# Patient Record
Sex: MALE | Race: BLACK OR AFRICAN AMERICAN | NOT HISPANIC OR LATINO | Employment: OTHER | URBAN - METROPOLITAN AREA
[De-identification: names, ages, dates, MRNs, and addresses within clinical notes are randomized per-mention and may not be internally consistent; named-entity substitution may affect disease eponyms.]

---

## 2022-07-15 DIAGNOSIS — C01 MALIGNANT NEOPLASM OF BASE OF TONGUE: Primary | ICD-10-CM

## 2022-07-16 PROBLEM — C01 SQUAMOUS CELL CARCINOMA OF BASE OF TONGUE: Status: ACTIVE | Noted: 2022-07-16

## 2022-07-16 PROBLEM — C77.0 SECONDARY MALIGNANT NEOPLASM OF CERVICAL LYMPH NODE: Status: ACTIVE | Noted: 2022-07-16

## 2022-07-16 PROBLEM — Z72.0 TOBACCO ABUSE: Status: ACTIVE | Noted: 2022-07-16

## 2022-07-16 PROBLEM — Z78.9 ALCOHOL USE: Status: ACTIVE | Noted: 2022-07-16

## 2022-07-16 PROBLEM — F10.90 ALCOHOL USE: Status: ACTIVE | Noted: 2022-07-16

## 2022-07-16 NOTE — PROGRESS NOTES
Past medical history:  Tobacco abuse since age 17. Intermittent alcohol use.  Procedure/surgical history: Back surgery (20-30 years ago, in use 10, apparently involving L5 fusion for prolapse this from work-related injury).  Social history:  Has been smoking a pack of cigarettes daily since age 17 years.  Has been drinking 6 beers daily for last 40 years.  Lately, trying to quit smoking and drinking.  Smokes marijuana.  .  Has 2 biological children of his own.  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  Lives in [place], [place]..  Family history:  Father  from some kind of cancer at age 72 years.  Health maintenance:  Does not have a regular primary care provider.  Does not visit with doctors.  No screening colonoscopy ever.      History of present illness:  57-year-old gentleman    He was admitted to [facility] 2022 with 2 months history of progressive dysphagia, odynophagia, soreness of tongue, 20 lb weight loss and bilateral neck masses.  Also, hoarseness.  CT neck showed ulcerated mass in the tongue base, malignant-appearing, along with lymph node metastasis.  ENT performed a laryngoscopy with biopsy of tongue mass.  Friable base of tongue mass.  No airway compromise.  Right upper lung lobe indistinct, ground-glass lung nodules, typical of a benign inflammatory etiology.  Discharged 2022.  Patient reported weight loss of> 20 lb in previous 2 months.    2022:  BUN 26. Creatinine 0.81, normal.  CMP essentially unremarkable.  WBC 14.0.  Hemoglobin 13.8.  Platelets 392 K. neutrophils 35%.  Lymphocytes 7%.      2022:  Panendoscopy ([facility]; Red Tay MD, ENT):  -progressive dysphagia, bilateral bulky cervical lymphadenopathy  -large friable base of tongue mass  -ulcerated friable mass base of the tongue that seems confined to base of tongue; lingual surface of  epiglottis is free from tumor; mass involves bilateral base of tongue, significantly midline    Pathology:  Tongue, base, biopsy:  -invasive squamous cell carcinoma, grade 3 of 4  -p16 +    07/05/2022: CT soft tissue of the neck without contrast:  -ulcerated tongue base mass, at least 3.4 cm by 3.0 cm x 3.1 cm; associated bulky level 2 and level 3 cervical lymphadenopathy; suspected level 1 and level 4 cervical lymphadenopathy; a right level 2 lymph node with central necrosis 2.6 x 3.3 cm; a left level 3 lymph node 4.5 x 3.2 cm; no significant airway compromise; no acute or aggressive bony lesions  -malignant ulcerated type mass with lymph node metastasis; no airway compromise; right upper lung lobe indistinct ground-glass pulmonary nodules typical of benign infectious or inflammatory etiology    07/18/2022:  Presents for initial medical oncology consultation, accompanied by his wife.  Pleasant gentleman.  In no acute discomfort.  40 lb weight loss in last 3 months.  Appetite is preserved.  Dysphagia.  No odynophagia.  Pain left side of throat overall the lymphadenopathy as well as during swallowing.  No hemoptysis, nausea, vomiting.  No dyspnea. Left-sided otalgia.  ECOG 0.   Chronic tobacco and alcohol abuse (see above).  Bilateral cervical lymphadenopathy started 3 months ago; slowly progressive.  -no unusual headaches, loss of consciousness, seizures, stroke-like symptoms, fevers, or chills.  No abdominal pain, nausea, vomiting.      Interval history:      Review of systems:  All systems reviewed, and found to be negative except for the symptoms detailed above.      Physical examination:  VITAL SIGNS:  Reviewed.      GENERAL:  In no apparent distress.    HEAD:  No signs of head trauma.  EYES:  Pupils are equal.  Extraocular motions intact.    EARS:  Hearing grossly intact.  MOUTH:  Oropharynx is normal.   NECK:  No adenopathy, no JVD.     CHEST:  Chest with clear breath sounds bilaterally.  No wheezes, rales, or  rhonchi.    CARDIAC:  Regular rate and rhythm.  S1 and S2, without murmurs, gallops, or rubs.  VASCULAR:  No Edema.  Peripheral pulses normal and equal in all extremities.  ABDOMEN:  Soft, without detectable tenderness.  No sign of distention.  No   rebound or guarding, and no masses palpated.   Bowel Sounds normal.  MUSCULOSKELETAL:  Good range of motion of all major joints. Extremities without clubbing, cyanosis or edema.    NEUROLOGIC EXAM:  Alert and oriented x 3.  No focal sensory or strength deficits.   Speech normal.  Follows commands.  PSYCHIATRIC:  Mood normal.  SKIN:  No rash or lesions.  07/18/2022:  In no acute discomfort.  Bulky bilateral cervical lymphadenopathy is apparent.  Left cervical lymphadenopathy measures at least 7 cm x 6 cm, slightly tender.  Right cervical lymphadenopathy measures about 4 cm x 3 cm.  Lymphadenopathy is mobile; left lymphadenopathy less so.  No superficial ulceration.      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, p16 +:  -presentation:  07/2022:  Dysphagia, odynophagia, 20 lb weight loss, bilateral neck masses, hoarseness  -CT soft tissues of the neck without contrast 07/05/2022  -S/P panendoscopy 07/06/2022  -ulcerated tongue base mass, at least 3.4 x 3.0 x 3.1 cm  -right level 2 lymph node with central necrosis, 2.6 x 3.2 cm on CT  -left level 3 lymph node 4.5 x 3.2 cm on CT  -ulcerated friable mass base of the tongue confined to base of tongue on panendoscopy  -invasive squamous cell carcinoma, grade 3 of 4; p16 +  -if no metastasis, then, cT2 cN2 MX, clinical prognostic stage II    # history of tobacco abuse since age 17    # history of alcohol use      Plan:    -if no metastasis, then, treatment options:  1. Concurrent chemoradiation therapy; or  2. Resection of primary and ipsilateral or bilateral neck dissection; or  3. Induction chemotherapy (category 3 recommendation) followed by radiotherapy or chemoradiation therapy     -will request formal  opinion from ENT; will refer    -need contrast enhanced imaging of soft tissues of the neck (CT with contrast or MRI with contrast)  -FDG PET-CT for staging  -CT chest with or without contrast    -dental evaluation, nutrition/speech/swallow evaluation:  To be pursued once plans of definitive concurrent chemoradiation therapy are finalized  -smoking cessation counseling:  Smoking and alcohol cessation discussed in detail.    Once it is decided to proceed weight definitive concurrent chemoradiation therapy, then, plan will be as follows.    Throat pain:  He has been taking Norco 7.5 mg every 6 hours p.r.n., with satisfactory pain relief; will refill  Will send a consultation to Internal Medicine for him to get established with a PCP.    We will treat with high-dose cisplatin concurrent with RT  -Cisplatin 100 mg/m² IV days 1, 22, and 23, concurrent with RT    -We will administer chemotherapy via PICC line  -Close monitoring of toxicity including hematologic, renal, electrolyte imbalance, ototoxicity, neuropathy, etc.  -Check CBC, CMP, and magnesium level weekly  -Discussed potential side effects of chemotherapy and gave him educational materials from [employer]  -Formal chemotherapy teaching with nursing staff to follow  -Will refer to radiation oncology at this time  -We will coordinate chemotherapy with radiation oncology  -Close follow-up of clinical toxicity, counts, chemistry profile, and electrolytes; CBC, CMP, and magnesium level weekly.    -For follow-up after chemoradiation therapy, see below    Discussed potential side effects of chemotherapy and chemoradiation therapy including nausea, vomiting, nutritional compromise, kidney toxicity, electrolyte imbalance, ototoxicity, peripheral neuropathy, cytopenias, infections, need for blood transfusion, infections, sepsis, oral mucositis resulting in pain and nutritional compromise, dehydration, etc.    Post CRT neck evaluation:   4-8 weeks clinical assessment as  appropriate:   1.  If residual primary, persistent disease, or progression: Then assess extent of disease or distant metastasis with CT or MRI with contrast or FDG PET/CT; followed by resection   2.  If response: Then, assess extent of disease or distant metastasis with FDG PET/CT at minimum 12 weeks weeks (preferred), or CT of primary and neck and/or MRI with contrast at 8-12 weeks     Tobacco cessation discussed.    Follow-up in 2 weeks.    Above discussed with the patient and his wife.  All questions answered.  Discussed labs, scans, pathology report, and staging of oropharyngeal cancer, and gave him copies of reports.  Discussed plan of management in detail.  He understands and agrees with this plan.  -------------------      Discussion:  Chemotherapy:   High-dose cisplatin: Days 1, 22, and 43: Cisplatin 100 mg/m² IV + concurrent RT    Post CRT neck evaluation:   4-8 weeks clinical assessment as appropriate:   1.  If residual primary, persistent disease, or progression: Then assess extent of disease or distant metastasis with CT or MRI with contrast or FDG PET/CT; followed by resection   2.  If response: Then, assess extent of disease or distant metastasis with FDG PET/CT at minimum 12 weeks weeks (preferred), or CT of primary and neck and/or MRI with contrast at 8-12 weeks

## 2022-07-18 ENCOUNTER — OFFICE VISIT (OUTPATIENT)
Dept: HEMATOLOGY/ONCOLOGY | Facility: CLINIC | Age: 57
End: 2022-07-18
Payer: MEDICAID

## 2022-07-18 VITALS
TEMPERATURE: 99 F | DIASTOLIC BLOOD PRESSURE: 90 MMHG | OXYGEN SATURATION: 100 % | RESPIRATION RATE: 20 BRPM | HEART RATE: 83 BPM | WEIGHT: 160.06 LBS | BODY MASS INDEX: 22.41 KG/M2 | SYSTOLIC BLOOD PRESSURE: 141 MMHG | HEIGHT: 71 IN

## 2022-07-18 DIAGNOSIS — C01 SQUAMOUS CELL CARCINOMA OF BASE OF TONGUE: ICD-10-CM

## 2022-07-18 DIAGNOSIS — C77.0 SECONDARY MALIGNANT NEOPLASM OF CERVICAL LYMPH NODE: ICD-10-CM

## 2022-07-18 DIAGNOSIS — Z78.9 ALCOHOL USE: ICD-10-CM

## 2022-07-18 DIAGNOSIS — Z72.0 TOBACCO ABUSE: ICD-10-CM

## 2022-07-18 DIAGNOSIS — C01 SQUAMOUS CELL CARCINOMA OF BASE OF TONGUE: Primary | ICD-10-CM

## 2022-07-18 LAB
ALBUMIN SERPL-MCNC: 3.4 GM/DL (ref 3.5–5)
ALBUMIN/GLOB SERPL: 1 RATIO (ref 1.1–2)
ALP SERPL-CCNC: 72 UNIT/L (ref 40–150)
ALT SERPL-CCNC: 8 UNIT/L (ref 0–55)
AST SERPL-CCNC: 9 UNIT/L (ref 5–34)
BASOPHILS # BLD AUTO: 0.04 X10(3)/MCL (ref 0–0.2)
BASOPHILS NFR BLD AUTO: 0.3 %
BILIRUBIN DIRECT+TOT PNL SERPL-MCNC: 0.3 MG/DL
BUN SERPL-MCNC: 15.4 MG/DL (ref 8.4–25.7)
CALCIUM SERPL-MCNC: 9.7 MG/DL (ref 8.4–10.2)
CHLORIDE SERPL-SCNC: 106 MMOL/L (ref 98–107)
CO2 SERPL-SCNC: 29 MMOL/L (ref 22–29)
CREAT SERPL-MCNC: 0.87 MG/DL (ref 0.73–1.18)
EOSINOPHIL # BLD AUTO: 0.14 X10(3)/MCL (ref 0–0.9)
EOSINOPHIL NFR BLD AUTO: 1.1 %
ERYTHROCYTE [DISTWIDTH] IN BLOOD BY AUTOMATED COUNT: 12.8 % (ref 11.5–17)
GLOBULIN SER-MCNC: 3.5 GM/DL (ref 2.4–3.5)
GLUCOSE SERPL-MCNC: 107 MG/DL (ref 74–100)
HCT VFR BLD AUTO: 36.7 % (ref 42–52)
HGB BLD-MCNC: 11.6 GM/DL (ref 14–18)
IMM GRANULOCYTES # BLD AUTO: 0.04 X10(3)/MCL (ref 0–0.04)
IMM GRANULOCYTES NFR BLD AUTO: 0.3 %
LYMPHOCYTES # BLD AUTO: 1.83 X10(3)/MCL (ref 0.6–4.6)
LYMPHOCYTES NFR BLD AUTO: 14.3 %
MCH RBC QN AUTO: 29.5 PG (ref 27–31)
MCHC RBC AUTO-ENTMCNC: 31.6 MG/DL (ref 33–36)
MCV RBC AUTO: 93.4 FL (ref 80–94)
MONOCYTES # BLD AUTO: 1.11 X10(3)/MCL (ref 0.1–1.3)
MONOCYTES NFR BLD AUTO: 8.7 %
NEUTROPHILS # BLD AUTO: 9.6 X10(3)/MCL (ref 2.1–9.2)
NEUTROPHILS NFR BLD AUTO: 75.3 %
NRBC BLD AUTO-RTO: 0 %
PLATELET # BLD AUTO: 378 X10(3)/MCL (ref 130–400)
PMV BLD AUTO: 8.9 FL (ref 7.4–10.4)
POTASSIUM SERPL-SCNC: 4.1 MMOL/L (ref 3.5–5.1)
PROT SERPL-MCNC: 6.9 GM/DL (ref 6.4–8.3)
RBC # BLD AUTO: 3.93 X10(6)/MCL (ref 4.7–6.1)
SODIUM SERPL-SCNC: 142 MMOL/L (ref 136–145)
WBC # SPEC AUTO: 12.8 X10(3)/MCL (ref 4.5–11.5)

## 2022-07-18 PROCEDURE — 99205 OFFICE O/P NEW HI 60 MIN: CPT | Mod: S$PBB,,, | Performed by: INTERNAL MEDICINE

## 2022-07-18 PROCEDURE — 36415 COLL VENOUS BLD VENIPUNCTURE: CPT

## 2022-07-18 PROCEDURE — 99214 OFFICE O/P EST MOD 30 MIN: CPT | Mod: PBBFAC | Performed by: INTERNAL MEDICINE

## 2022-07-18 PROCEDURE — 99205 PR OFFICE/OUTPT VISIT, NEW, LEVL V, 60-74 MIN: ICD-10-PCS | Mod: S$PBB,,, | Performed by: INTERNAL MEDICINE

## 2022-07-18 RX ORDER — HYDROCODONE BITARTRATE AND ACETAMINOPHEN 5; 325 MG/1; MG/1
TABLET ORAL
COMMUNITY
Start: 2022-07-07 | End: 2022-07-18

## 2022-07-18 RX ORDER — AMLODIPINE BESYLATE 2.5 MG/1
2.5 TABLET ORAL DAILY
COMMUNITY
Start: 2022-07-07 | End: 2022-10-31 | Stop reason: SDUPTHER

## 2022-07-18 RX ORDER — HYDROCODONE BITARTRATE AND ACETAMINOPHEN 7.5; 325 MG/1; MG/1
1 TABLET ORAL EVERY 6 HOURS PRN
Qty: 56 TABLET | Refills: 0 | Status: SHIPPED | OUTPATIENT
Start: 2022-07-18 | End: 2022-08-01

## 2022-07-18 NOTE — TELEPHONE ENCOUNTER
Orders for today:     CBC, CMP     Contrast enhanced CT scan of soft tissues of the neck  Contrast enhanced CT scan of chest  FDG PET-CT for staging     Refer to ENT for formal consultation and recommendations in terms of treatment with concurrent chemoradiation therapy versus resection of primary and ipsilateral/bilateral neck dissection:  ASAP!     Follow-up in 2 weeks.

## 2022-07-26 ENCOUNTER — OFFICE VISIT (OUTPATIENT)
Dept: OTOLARYNGOLOGY | Facility: CLINIC | Age: 57
End: 2022-07-26
Payer: MEDICAID

## 2022-07-26 VITALS
HEIGHT: 71 IN | TEMPERATURE: 99 F | OXYGEN SATURATION: 99 % | HEART RATE: 74 BPM | DIASTOLIC BLOOD PRESSURE: 76 MMHG | WEIGHT: 159.19 LBS | SYSTOLIC BLOOD PRESSURE: 142 MMHG | BODY MASS INDEX: 22.29 KG/M2

## 2022-07-26 DIAGNOSIS — C01 SQUAMOUS CELL CARCINOMA OF BASE OF TONGUE: ICD-10-CM

## 2022-07-26 DIAGNOSIS — C77.0 SECONDARY MALIGNANT NEOPLASM OF CERVICAL LYMPH NODE: ICD-10-CM

## 2022-07-26 PROCEDURE — 99214 OFFICE O/P EST MOD 30 MIN: CPT | Mod: PBBFAC | Performed by: OTOLARYNGOLOGY

## 2022-07-26 RX ORDER — GABAPENTIN 100 MG/1
100 CAPSULE ORAL 3 TIMES DAILY
Qty: 90 CAPSULE | Refills: 11 | Status: SHIPPED | OUTPATIENT
Start: 2022-07-26 | End: 2022-12-01 | Stop reason: DRUGHIGH

## 2022-07-26 NOTE — H&P (VIEW-ONLY)
[facility]  Otolaryngology Clinic Note    Yaron Brennan  Encounter Date: 7/26/2022  YOB: 1965  Physician: Anna Taylor    Chief Complaint: Otalgia, neck mass    HPI: Yaron Brennan is a 57 y.o. male with HTN who presents as referral from Dr. Welsh for squamous cell carcinoma of base of tongue. Patient reports that toward the beginning of the year he started to have worsening left otalgia. He then noticed a bump/swelling on left neck that he thought was was due to a bug bite. This got progressively bigger over time and he then noticed some swelling on right neck as well. He eventually presented to [facility] in early July 2022. He had a CT neck that showed an ulcerated mass in the tongue base, and underwent direct laryngoscopy with ENT on 7/6/22 (Dr. Tay). Operative findings: ulcerated friable mass base of the tongue that seems confined to base of tongue; lingual surface of epiglottis is free from tumor; mass involves bilateral base of tongue, significantly midline. Results returned positive for p16+ SCCa. Today in clinic patient reports bilateral L>R otalgia, mild dysphagia/odynophagia. He reports he eats basically whatever he wants without overt signs of aspiration. He does report 40lb weight loss in the last 6 months. + Neck LAD. Reports voice changes. Denies any issues breathing. Smoked 1ppd since 17 years old (40 years) but quit a month ago when he got his diagnosis. Also previously drank 12 pack of beer a day and likewise quite a month ago. Referred to ENT for consideration of possible surgical resection.     07/05/2022: CT soft tissue of the neck without contrast:  -ulcerated tongue base mass, at least 3.4 cm by 3.0 cm x 3.1 cm; associated bulky level 2 and level 3 cervical lymphadenopathy; suspected level 1 and level 4 cervical lymphadenopathy; a right level 2 lymph node with central necrosis 2.6 x 3.3 cm; a left level 3 lymph node 4.5 x 3.2 cm; no  significant airway compromise; no acute or aggressive bony lesions  -malignant ulcerated type mass with lymph node metastasis; no airway compromise; right upper lung lobe indistinct ground-glass pulmonary nodules typical of benign infectious or inflammatory etiology    ROS:   General: Negative except per HPI  Skin: Denies rash, ulcer, or lesion.  Eyes: Denies vision changes or diplopia.  Ears: Negative except per HPI  Nose: Negative except per HPI  Throat/mouth: Negative except per HPI  Cardiovascular: Negative except per HPI  Respiratory: Negative except per HPI  Neck: Negative except per HPI  Endocrine: Negative except per HPI  Neurologic: Negative except per HPI    Other 10-point review of systems negative except per HPI      Review of patient's allergies indicates:  No Known Allergies    Past Medical History:   Diagnosis Date    Cancer     Hypertension        Past Surgical History:   Procedure Laterality Date    BACK SURGERY         Social History     Socioeconomic History    Marital status:    Tobacco Use    Smoking status: Former Smoker     Types: Cigarettes     Start date:      Quit date:      Years since quittin.5    Smokeless tobacco: Never Used   Substance and Sexual Activity    Alcohol use: Not Currently    Drug use: Yes     Types: Marijuana    Sexual activity: Yes       Family History   Problem Relation Age of Onset    Hypertension Mother     Cancer Father     Hypertension Sister     Hypertension Brother        Outpatient Encounter Medications as of 2022   Medication Sig Dispense Refill    amLODIPine (NORVASC) 2.5 MG tablet Take 2.5 mg by mouth once daily.      HYDROcodone-acetaminophen (NORCO) 7.5-325 mg per tablet Take 1 tablet by mouth every 6 (six) hours as needed for Pain. 56 tablet 0     No facility-administered encounter medications on file as of 2022.       Physical Exam:  Vitals:    22 1304 22 1314   BP: (!) 138/90 (!) 142/76   BP Location:  "Left arm Left arm   Patient Position: Sitting Sitting   BP Method: Large (Automatic) Large (Manual)   Pulse: 74    Temp: 99 °F (37.2 °C)    TempSrc: Oral    SpO2: 99%    Weight: 72.2 kg (159 lb 2.8 oz)    Height: 5' 11" (1.803 m)        Constitutional  General Appearance: well nourished, well-developed, AAO x3, NAD; voice muffled/hot potato voice  HEENT  Eyes: PEERLA, EOMI, normal conjunctivae  Ears: Hearing well at conversation level;    AD: auricle normal,cerumen impaction   AS: auricle normal, cerumen impaction   Vestibular: ambulates without gait disturbance  Nose: septum midline, no inferior turbinate hypertrophy, no polyps, moist mucosa without erythema or blue hue  OC/OP: dentition poor, no oral lesions, tongue/FOM/BOT- soft, no leukoplakia/ulcerations/ tenderness, good extension  Nasopharynx, Hypopharynx, and Larynx:    Indirect: attempted, limited view due to patient intolerance  Neck: Left level III/IV 5-6cm lymph node, non tender, firm; right level II 3cm lymph node  Neuro: CN II - XII intact bilaterally  Cardiovascular: peripheral pulses palpable  Respiratory: non-labored respirations  Psychiatric: oriented to time, place and person, no depression, anxiety or agitation    Procedures:Flexible Fiberoptic Laryngoscopy/Nasopharyngoscopy via right nare    Procedure in Detail: Informed consent was obtained from the patient after explanation of procedure, indications, risks and benefits. Flexible endoscopy was performed through the nasal passages. The nasal cavity, nasopharynx, oropharynx, hypopharynx and larynx were adequately visualized. The true vocal cords and arytenoids were examined during phonation and repose.    Anesthesia: None  Adverse Events: None  Resident Surgeon: Anna Taylor MD   Blood loss: none  Condition: good    Findings:  NP/OP: no masses/lesions of NC, eustachian tube, fossa of Rosenmuller, no adenoid hypertrophy  BOT/vallecula: secretions filling vallecula, discrete fullness/exophytic " "mass that appears to arise from left BOT extending across midline, lingual surface of epiglottis appears free of disease  Piriform sinuses/post-cricoid: no masses/lesions, no pooling of secretions; difficult to see left piriform  Epiglottis: lingual and laryngeal surfaces within normal limits  Arytenoids/FVFs: no masses/lesions, no edema, bilateral mobility  TVCs: bilateral cord mobility, no masses or lesions, mild edema  No aspiration, no pooled secretions, widely patent airway    Pertinent Data:  ? LABS:  ? AUDIO:  ? CT Scan:    Imaging:   I personally reviewed the following images:    Summary of Outside Records:      Assessment/Plan:  Yaron Brennan is a 57 y.o. 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 At least staged T2N2Mx.     Not an airway concern at this time, but did discuss that should there be deterioration of his swallowing or airway status as a result of swelling or bleeding that we would potentially need temporary placement of a tracheostomy and/or PEG tube. Patient is agreeable to this.     Has been evaluated by Heme Onc and is here as referral from Dr. Welsh for consideration of surgical resection. Will wait for imaging (PET CT ordered and scheduled for 7/29) to decide but given current reports of "at least 3.4 cm by 3.0 cm x 3.1 cm" mass feel this is likely too large for adequate surgical resection with clear margins without causing significant morbidity to his swallowing, especially given that his current swallowing function appears to be intact.     Have discussed patient with Anabel Chu DDS and will plan on taking patient to the OR soon for dental extractions. Will plan on performing DL at this time as well.     Will likely be presented at next [facility] Tumor Board in 2 weeks.     RTC 2 weeks, will call with surgery date if made before then.     Anna Taylor MD  [facility] Department of " Otolaryngology  HO-IV

## 2022-07-26 NOTE — PROGRESS NOTES
The scope used for the exam was:  Flexible scope ENF-P4  Serial Number:  1)    [id number]    []   2)    [id number]    []   3)    [id number]    []   4)    [id number]    [x]   5)    [id number]    []   6)    [id number]    []       The scope used for the exam was:  Rigid scope   Serial Number:  1)   [id number]    []   2)   [id number]    []   3)   7330    []   4)    3384   []   5)    0824   []   6)    5554   []     7)   7425    []   8)   2240    []   9)   1109    []

## 2022-07-26 NOTE — PROGRESS NOTES
[facility]  Otolaryngology Clinic Note    Yaron Brennan  Encounter Date: 7/26/2022  YOB: 1965  Physician: Anna Taylor    Chief Complaint: Otalgia, neck mass    HPI: Yaron Brennan is a 57 y.o. male with HTN who presents as referral from Dr. Welsh for squamous cell carcinoma of base of tongue. Patient reports that toward the beginning of the year he started to have worsening left otalgia. He then noticed a bump/swelling on left neck that he thought was was due to a bug bite. This got progressively bigger over time and he then noticed some swelling on right neck as well. He eventually presented to [facility] in early July 2022. He had a CT neck that showed an ulcerated mass in the tongue base, and underwent direct laryngoscopy with ENT on 7/6/22 (Dr. Tay). Operative findings: ulcerated friable mass base of the tongue that seems confined to base of tongue; lingual surface of epiglottis is free from tumor; mass involves bilateral base of tongue, significantly midline. Results returned positive for p16+ SCCa. Today in clinic patient reports bilateral L>R otalgia, mild dysphagia/odynophagia. He reports he eats basically whatever he wants without overt signs of aspiration. He does report 40lb weight loss in the last 6 months. + Neck LAD. Reports voice changes. Denies any issues breathing. Smoked 1ppd since 17 years old (42 years) but quit a month ago when he got his diagnosis. Also previously drank 12 pack of beer a day and likewise quite a month ago. Referred to ENT for consideration of possible surgical resection.     07/05/2022: CT soft tissue of the neck without contrast:  -ulcerated tongue base mass, at least 3.4 cm by 3.0 cm x 3.1 cm; associated bulky level 2 and level 3 cervical lymphadenopathy; suspected level 1 and level 4 cervical lymphadenopathy; a right level 2 lymph node with central necrosis 2.6 x 3.3 cm; a left level 3 lymph node 4.5 x 3.2 cm; no  significant airway compromise; no acute or aggressive bony lesions  -malignant ulcerated type mass with lymph node metastasis; no airway compromise; right upper lung lobe indistinct ground-glass pulmonary nodules typical of benign infectious or inflammatory etiology    ROS:   General: Negative except per HPI  Skin: Denies rash, ulcer, or lesion.  Eyes: Denies vision changes or diplopia.  Ears: Negative except per HPI  Nose: Negative except per HPI  Throat/mouth: Negative except per HPI  Cardiovascular: Negative except per HPI  Respiratory: Negative except per HPI  Neck: Negative except per HPI  Endocrine: Negative except per HPI  Neurologic: Negative except per HPI    Other 10-point review of systems negative except per HPI      Review of patient's allergies indicates:  No Known Allergies    Past Medical History:   Diagnosis Date    Cancer     Hypertension        Past Surgical History:   Procedure Laterality Date    BACK SURGERY         Social History     Socioeconomic History    Marital status:    Tobacco Use    Smoking status: Former Smoker     Types: Cigarettes     Start date:      Quit date:      Years since quittin.5    Smokeless tobacco: Never Used   Substance and Sexual Activity    Alcohol use: Not Currently    Drug use: Yes     Types: Marijuana    Sexual activity: Yes       Family History   Problem Relation Age of Onset    Hypertension Mother     Cancer Father     Hypertension Sister     Hypertension Brother        Outpatient Encounter Medications as of 2022   Medication Sig Dispense Refill    amLODIPine (NORVASC) 2.5 MG tablet Take 2.5 mg by mouth once daily.      HYDROcodone-acetaminophen (NORCO) 7.5-325 mg per tablet Take 1 tablet by mouth every 6 (six) hours as needed for Pain. 56 tablet 0     No facility-administered encounter medications on file as of 2022.       Physical Exam:  Vitals:    22 1304 22 1314   BP: (!) 138/90 (!) 142/76   BP Location:  "Left arm Left arm   Patient Position: Sitting Sitting   BP Method: Large (Automatic) Large (Manual)   Pulse: 74    Temp: 99 °F (37.2 °C)    TempSrc: Oral    SpO2: 99%    Weight: 72.2 kg (159 lb 2.8 oz)    Height: 5' 11" (1.803 m)        Constitutional  General Appearance: well nourished, well-developed, AAO x3, NAD; voice muffled/hot potato voice  HEENT  Eyes: PEERLA, EOMI, normal conjunctivae  Ears: Hearing well at conversation level;    AD: auricle normal,cerumen impaction   AS: auricle normal, cerumen impaction   Vestibular: ambulates without gait disturbance  Nose: septum midline, no inferior turbinate hypertrophy, no polyps, moist mucosa without erythema or blue hue  OC/OP: dentition poor, no oral lesions, tongue/FOM/BOT- soft, no leukoplakia/ulcerations/ tenderness, good extension  Nasopharynx, Hypopharynx, and Larynx:    Indirect: attempted, limited view due to patient intolerance  Neck: Left level III/IV 5-6cm lymph node, non tender, firm; right level II 3cm lymph node  Neuro: CN II - XII intact bilaterally  Cardiovascular: peripheral pulses palpable  Respiratory: non-labored respirations  Psychiatric: oriented to time, place and person, no depression, anxiety or agitation    Procedures:Flexible Fiberoptic Laryngoscopy/Nasopharyngoscopy via right nare    Procedure in Detail: Informed consent was obtained from the patient after explanation of procedure, indications, risks and benefits. Flexible endoscopy was performed through the nasal passages. The nasal cavity, nasopharynx, oropharynx, hypopharynx and larynx were adequately visualized. The true vocal cords and arytenoids were examined during phonation and repose.    Anesthesia: None  Adverse Events: None  Resident Surgeon: Anna Taylor MD   Blood loss: none  Condition: good    Findings:  NP/OP: no masses/lesions of NC, eustachian tube, fossa of Rosenmuller, no adenoid hypertrophy  BOT/vallecula: secretions filling vallecula, discrete fullness/exophytic " "mass that appears to arise from left BOT extending across midline, lingual surface of epiglottis appears free of disease  Piriform sinuses/post-cricoid: no masses/lesions, no pooling of secretions; difficult to see left piriform  Epiglottis: lingual and laryngeal surfaces within normal limits  Arytenoids/FVFs: no masses/lesions, no edema, bilateral mobility  TVCs: bilateral cord mobility, no masses or lesions, mild edema  No aspiration, no pooled secretions, widely patent airway    Pertinent Data:  ? LABS:  ? AUDIO:  ? CT Scan:    Imaging:   I personally reviewed the following images:    Summary of Outside Records:      Assessment/Plan:  Yaron Brennan is a 57 y.o. 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 At least staged T2N2Mx.     Not an airway concern at this time, but did discuss that should there be deterioration of his swallowing or airway status as a result of swelling or bleeding that we would potentially need temporary placement of a tracheostomy and/or PEG tube. Patient is agreeable to this.     Has been evaluated by Heme Onc and is here as referral from Dr. Welsh for consideration of surgical resection. Will wait for imaging (PET CT ordered and scheduled for 7/29) to decide but given current reports of "at least 3.4 cm by 3.0 cm x 3.1 cm" mass feel this is likely too large for adequate surgical resection with clear margins without causing significant morbidity to his swallowing, especially given that his current swallowing function appears to be intact.     Have discussed patient with Anabel Chu DDS and will plan on taking patient to the OR soon for dental extractions. Will plan on performing DL at this time as well.     Will likely be presented at next [facility] in 2 weeks.     RTC 2 weeks, will call with surgery date if made before then.     Anna Taylor MD  [facility] Department of " Otolaryngology  HO[facility]

## 2022-07-28 DIAGNOSIS — C01 SQUAMOUS CELL CARCINOMA OF BASE OF TONGUE: Primary | ICD-10-CM

## 2022-07-28 DIAGNOSIS — C01 MALIGNANT TUMOR OF BASE OF TONGUE: ICD-10-CM

## 2022-07-28 RX ORDER — SODIUM CHLORIDE 0.9 % (FLUSH) 0.9 %
10 SYRINGE (ML) INJECTION
Status: SHIPPED | OUTPATIENT
Start: 2022-07-28

## 2022-07-28 RX ORDER — LIDOCAINE HYDROCHLORIDE 10 MG/ML
1 INJECTION, SOLUTION EPIDURAL; INFILTRATION; INTRACAUDAL; PERINEURAL ONCE
Status: CANCELLED | OUTPATIENT
Start: 2022-07-28 | End: 2022-07-28

## 2022-07-28 NOTE — PROGRESS NOTES
I have reviewed the notes, assessments, and/or procedures performed this visit, and I concur with the documentation.    Mekhi Grossman M.D.

## 2022-07-29 ENCOUNTER — ANESTHESIA EVENT (OUTPATIENT)
Dept: SURGERY | Facility: HOSPITAL | Age: 57
End: 2022-07-29
Payer: MEDICAID

## 2022-07-29 NOTE — ANESTHESIA PREPROCEDURE EVALUATION
07/29/2022  Yaron Brennan is a 57 y.o., male with PMHx of HTN, smoking, ETOH abuse presents for teeth extraction secondary to oropharyngeal CA.    COVID STATUS: TEST DOS   Latest Reference Range & Units 08/05/22 05:51   SARS Coronavirus 2 Antigen Negative  Negative     BETA-BLOCKER: NONE        PAT NURSE PHONE INTERVIEW 8/1/22    PROBLEM LIST:  -  BASE of TONGUE SCC w/BILATERAL NECK METASTASIS (Dx 7/6/22), DYSPHAGIA, ODYNOPHAGIA      - HOSP. 7/5-7/7/22 @ [facility]        -  7/6/2022 DL = -progressive dysphagia, bilateral bulky cervical lymphadenopathy. large friable base of tongue mass. ulcerated friable mass base of the tongue that seems confined to base of tongue; lingual surface of epiglottis is free from tumor; mass involves bilateral base of tongue, significantly midline. TONGUE, BASE, BIOPSY: INVASIVE SQUAMOUS CELL CARCINOMA, GRADE 3 OF 4. P16 IS POSITIVE         - Large ulcerated tongue base mass measuring at least 5 cm in diameter. Cystic sabrina metastasis in the neck bilaterally on CT  -  HTN  -  WEIGHT LOSS 20# in 2 mos.  -  Hx 20-30 yrs. AGO L-SPINE FUSION  -  PAST SMOKER - QUIT 1/2022, 40+PPY      - A small area of faint patchy ground-glass is present in the posterior right upper lobe on CT  -  MARIJUANA USE  -  Hx ETOH ABUSE - 6 BEERS/DAY X 40yrs.    AM Rx DOS: AMLODIPINE, GABAPENTIN, NORCO PRN    ORDERS -   SURGEON: 7/18/22 CBC, CMP; 7/29/22 CT THORAX; NO NEW ORDERS  ANESTHESIA: NONE  7/26/22 Procedures:Flexible Fiberoptic Laryngoscopy/Nasopharyngoscopy via right nare     Procedure in Detail: Informed consent was obtained from the patient after explanation of procedure, indications, risks and benefits. Flexible endoscopy was performed through the nasal passages. The nasal cavity, nasopharynx, oropharynx, hypopharynx and larynx were adequately visualized. The true vocal cords and arytenoids were  examined during phonation and repose.     Anesthesia: None  Adverse Events: None  Resident Surgeon: Anna Taylor MD   Blood loss: none  Condition: good     Findings:  NP/OP: no masses/lesions of NC, eustachian tube, fossa of Rosenmuller, no adenoid hypertrophy  BOT/vallecula: secretions filling vallecula, discrete fullness/exophytic mass that appears to arise from left BOT extending across midline, lingual surface of epiglottis appears free of disease  Piriform sinuses/post-cricoid: no masses/lesions, no pooling of secretions; difficult to see left piriform  Epiglottis: lingual and laryngeal surfaces within normal limits  Arytenoids/FVFs: no masses/lesions, no edema, bilateral mobility  TVCs: bilateral cord mobility, no masses or lesions, mild edema  No aspiration, no pooled secretions, widely patent airway     Pre-op Assessment    I have reviewed the NPO Status.      Review of Systems  Anesthesia Hx:  No problems with previous Anesthesia    Social:  Former Smoker    Hematology/Oncology:        Current/Recent Cancer. Other (see Oncology comments)   Cardiovascular:   Hypertension, well controlled    Pulmonary:  Pulmonary Normal    Renal/:  Renal/ Normal     Hepatic/GI:  Hepatic/GI Normal    Neurological:  Neurology Normal    Endocrine:  Endocrine Normal      Vitals:    08/05/22 0530 08/05/22 0534 08/05/22 0644   BP: (!) 137/90  132/75   BP Location:   Right arm   Patient Position:   Lying   Pulse:   88   Resp:   20   Temp:   36.3 °C (97.3 °F)   TempSrc:   Temporal   SpO2:   100%   Weight:  71 kg (156 lb 8.4 oz)          Physical Exam  General: Alert, Cooperative and Well nourished    Airway:  Mallampati: III   Mouth Opening: Normal  TM Distance: Normal  Tongue: Decreased Mobility  Neck ROM: Normal ROM    Dental:  Intact    Chest/Lungs:  Clear to auscultation, Normal Respiratory Rate    Heart:  Rate: Normal  Rhythm: Regular Rhythm  Sounds: Normal      Lab Results   Component Value Date    WBC 12.8 (H) 07/18/2022     HGB 11.6 (L) 07/18/2022    HCT 36.7 (L) 07/18/2022    MCV 93.4 07/18/2022     07/18/2022       CMP  Sodium Level   Date Value Ref Range Status   07/18/2022 142 136 - 145 mmol/L Final     Potassium Level   Date Value Ref Range Status   07/18/2022 4.1 3.5 - 5.1 mmol/L Final     Carbon Dioxide   Date Value Ref Range Status   07/18/2022 29 22 - 29 mmol/L Final     Blood Urea Nitrogen   Date Value Ref Range Status   07/18/2022 15.4 8.4 - 25.7 mg/dL Final     Creatinine   Date Value Ref Range Status   07/18/2022 0.87 0.73 - 1.18 mg/dL Final     Calcium Level Total   Date Value Ref Range Status   07/18/2022 9.7 8.4 - 10.2 mg/dL Final     Albumin Level   Date Value Ref Range Status   07/18/2022 3.4 (L) 3.5 - 5.0 gm/dL Final     Bilirubin Total   Date Value Ref Range Status   07/18/2022 0.3 <=1.5 mg/dL Final     Alkaline Phosphatase   Date Value Ref Range Status   07/18/2022 72 40 - 150 unit/L Final     Aspartate Aminotransferase   Date Value Ref Range Status   07/18/2022 9 5 - 34 unit/L Final     Alanine Aminotransferase   Date Value Ref Range Status   07/18/2022 8 0 - 55 unit/L Final     Estimated GFR-Non    Date Value Ref Range Status   07/18/2022 >60 mls/min/1.73/m2 Final           7/29/22 CT SOFT TISSUE NECK  FINDINGS:  The included brain shows no hemorrhage, mass, mass effect, or midline shift.  There is no abnormal intracranial enhancement.     A large ulcerated mass is present at the tongue base, measuring approximately 5 cm in greatest diameter (example sagittal reformats image 63.)  Cystic sabrina metastasis is present in the neck bilaterally.  The largest lymph node is present in the left level III position, in the axial plane measuring approximately 5.1 x 4.2 cm.  The largest lymph node on the right is present at the level II position measuring approximately 3.2 x 2.9 cm.  The lung apices are clear.  The aortic arch and its branch vessels symmetrically enhanced.  Disc disease is mild at  C4-C5 and C5-C6.  Dental disease is noted.  No bone lesion is identified.  No adenopathy is identified in the included mediastinum.     Impression:     Large ulcerated tongue base mass measuring at least 5 cm in diameter.     Cystic sabrina metastasis in the neck bilaterally.     These findings are consistent with squamous cell carcinoma.        Anesthesia Plan  Type of Anesthesia, risks & benefits discussed:    Anesthesia Type: Gen ETT  Intra-op Monitoring Plan: Standard ASA Monitors  Post Op Pain Control Plan: IV/PO Opioids PRN  Induction:  IV  Airway Plan: Direct  Informed Consent: Informed consent signed with the Patient and all parties understand the risks and agree with anesthesia plan.  All questions answered.   ASA Score: 3  Day of Surgery Review of History & Physical: H&P Update referred to the surgeon/provider.    Ready For Surgery From Anesthesia Perspective.     .

## 2022-08-03 ENCOUNTER — PATIENT MESSAGE (OUTPATIENT)
Dept: ADMINISTRATIVE | Facility: OTHER | Age: 57
End: 2022-08-03
Payer: MEDICAID

## 2022-08-04 ENCOUNTER — PATIENT MESSAGE (OUTPATIENT)
Dept: ADMINISTRATIVE | Facility: OTHER | Age: 57
End: 2022-08-04
Payer: MEDICAID

## 2022-08-05 ENCOUNTER — ANESTHESIA (OUTPATIENT)
Dept: SURGERY | Facility: HOSPITAL | Age: 57
End: 2022-08-05
Payer: MEDICAID

## 2022-08-05 ENCOUNTER — HOSPITAL ENCOUNTER (OUTPATIENT)
Facility: HOSPITAL | Age: 57
Discharge: HOME | End: 2022-08-05
Attending: OTOLARYNGOLOGY | Admitting: OTOLARYNGOLOGY
Payer: MEDICAID

## 2022-08-05 DIAGNOSIS — C01 MALIGNANT TUMOR OF BASE OF TONGUE: ICD-10-CM

## 2022-08-05 DIAGNOSIS — C01 SQUAMOUS CELL CARCINOMA OF BASE OF TONGUE: ICD-10-CM

## 2022-08-05 LAB
CTP QC/QA: YES
SARS-COV-2 AG RESP QL IA.RAPID: NEGATIVE

## 2022-08-05 PROCEDURE — 25000003 PHARM REV CODE 250: Performed by: STUDENT IN AN ORGANIZED HEALTH CARE EDUCATION/TRAINING PROGRAM

## 2022-08-05 PROCEDURE — 63600175 PHARM REV CODE 636 W HCPCS: Performed by: ANESTHESIOLOGY

## 2022-08-05 PROCEDURE — 27201423 OPTIME MED/SURG SUP & DEVICES STERILE SUPPLY: Performed by: OTOLARYNGOLOGY

## 2022-08-05 PROCEDURE — 36000708 HC OR TIME LEV III 1ST 15 MIN: Performed by: OTOLARYNGOLOGY

## 2022-08-05 PROCEDURE — 25000003 PHARM REV CODE 250: Performed by: ANESTHESIOLOGY

## 2022-08-05 PROCEDURE — 37000008 HC ANESTHESIA 1ST 15 MINUTES: Performed by: OTOLARYNGOLOGY

## 2022-08-05 PROCEDURE — 37000009 HC ANESTHESIA EA ADD 15 MINS: Performed by: OTOLARYNGOLOGY

## 2022-08-05 PROCEDURE — 00752 ANES HRNA RPR LMBR&VNT&/DEHS: CPT | Performed by: OTOLARYNGOLOGY

## 2022-08-05 PROCEDURE — 25000003 PHARM REV CODE 250: Performed by: NURSE ANESTHETIST, CERTIFIED REGISTERED

## 2022-08-05 PROCEDURE — 36000709 HC OR TIME LEV III EA ADD 15 MIN: Performed by: OTOLARYNGOLOGY

## 2022-08-05 PROCEDURE — 63600175 PHARM REV CODE 636 W HCPCS: Performed by: NURSE ANESTHETIST, CERTIFIED REGISTERED

## 2022-08-05 PROCEDURE — 71000033 HC RECOVERY, INTIAL HOUR: Performed by: OTOLARYNGOLOGY

## 2022-08-05 PROCEDURE — 71000015 HC POSTOP RECOV 1ST HR: Performed by: OTOLARYNGOLOGY

## 2022-08-05 PROCEDURE — 71000016 HC POSTOP RECOV ADDL HR: Performed by: OTOLARYNGOLOGY

## 2022-08-05 RX ORDER — OXYMETAZOLINE HCL 0.05 %
SPRAY, NON-AEROSOL (ML) NASAL
Status: DISCONTINUED
Start: 2022-08-05 | End: 2022-08-05 | Stop reason: WASHOUT

## 2022-08-05 RX ORDER — CEFAZOLIN SODIUM 1 G/3ML
INJECTION, POWDER, FOR SOLUTION INTRAMUSCULAR; INTRAVENOUS
Status: DISCONTINUED | OUTPATIENT
Start: 2022-08-05 | End: 2022-08-05

## 2022-08-05 RX ORDER — CEFAZOLIN SODIUM 2 G/50ML
2 SOLUTION INTRAVENOUS
Status: DISCONTINUED | OUTPATIENT
Start: 2022-08-05 | End: 2022-08-05 | Stop reason: HOSPADM

## 2022-08-05 RX ORDER — MIDAZOLAM HYDROCHLORIDE 1 MG/ML
2 INJECTION INTRAMUSCULAR; INTRAVENOUS ONCE AS NEEDED
Status: COMPLETED | OUTPATIENT
Start: 2022-08-05 | End: 2022-08-05

## 2022-08-05 RX ORDER — CHLORHEXIDINE GLUCONATE ORAL RINSE 1.2 MG/ML
15 SOLUTION DENTAL 2 TIMES DAILY
Qty: 473 ML | Refills: 0 | OUTPATIENT
Start: 2022-08-05 | End: 2022-08-19

## 2022-08-05 RX ORDER — LIDOCAINE HYDROCHLORIDE 10 MG/ML
1 INJECTION, SOLUTION EPIDURAL; INFILTRATION; INTRACAUDAL; PERINEURAL ONCE
Status: ACTIVE | OUTPATIENT
Start: 2022-08-05

## 2022-08-05 RX ORDER — SUCCINYLCHOLINE CHLORIDE 20 MG/ML
INJECTION INTRAMUSCULAR; INTRAVENOUS
Status: DISCONTINUED | OUTPATIENT
Start: 2022-08-05 | End: 2022-08-05

## 2022-08-05 RX ORDER — ONDANSETRON 2 MG/ML
4 INJECTION INTRAMUSCULAR; INTRAVENOUS DAILY PRN
Status: DISCONTINUED | OUTPATIENT
Start: 2022-08-05 | End: 2022-08-05

## 2022-08-05 RX ORDER — MIDAZOLAM HYDROCHLORIDE 1 MG/ML
INJECTION INTRAMUSCULAR; INTRAVENOUS
Status: DISCONTINUED
Start: 2022-08-05 | End: 2022-08-05 | Stop reason: HOSPADM

## 2022-08-05 RX ORDER — SODIUM CHLORIDE, SODIUM LACTATE, POTASSIUM CHLORIDE, CALCIUM CHLORIDE 600; 310; 30; 20 MG/100ML; MG/100ML; MG/100ML; MG/100ML
INJECTION, SOLUTION INTRAVENOUS CONTINUOUS
Status: ACTIVE | OUTPATIENT
Start: 2022-08-05

## 2022-08-05 RX ORDER — ROCURONIUM BROMIDE 10 MG/ML
INJECTION, SOLUTION INTRAVENOUS
Status: DISCONTINUED | OUTPATIENT
Start: 2022-08-05 | End: 2022-08-05

## 2022-08-05 RX ORDER — LIDOCAINE HYDROCHLORIDE 10 MG/ML
1 INJECTION, SOLUTION EPIDURAL; INFILTRATION; INTRACAUDAL; PERINEURAL ONCE
Status: DISCONTINUED | OUTPATIENT
Start: 2022-08-05 | End: 2022-08-05 | Stop reason: HOSPADM

## 2022-08-05 RX ORDER — MORPHINE SULFATE 2 MG/ML
2 INJECTION, SOLUTION INTRAMUSCULAR; INTRAVENOUS EVERY 5 MIN PRN
Status: DISCONTINUED | OUTPATIENT
Start: 2022-08-05 | End: 2022-08-05

## 2022-08-05 RX ORDER — HYDROCODONE BITARTRATE AND ACETAMINOPHEN 5; 325 MG/1; MG/1
1 TABLET ORAL EVERY 6 HOURS PRN
Qty: 20 TABLET | Refills: 0 | OUTPATIENT
Start: 2022-08-05

## 2022-08-05 RX ORDER — DEXAMETHASONE SODIUM PHOSPHATE 4 MG/ML
INJECTION, SOLUTION INTRA-ARTICULAR; INTRALESIONAL; INTRAMUSCULAR; INTRAVENOUS; SOFT TISSUE
Status: DISCONTINUED | OUTPATIENT
Start: 2022-08-05 | End: 2022-08-05

## 2022-08-05 RX ORDER — LIDOCAINE HYDROCHLORIDE 20 MG/ML
INJECTION, SOLUTION EPIDURAL; INFILTRATION; INTRACAUDAL; PERINEURAL
Status: DISCONTINUED | OUTPATIENT
Start: 2022-08-05 | End: 2022-08-05

## 2022-08-05 RX ORDER — PROPOFOL 10 MG/ML
VIAL (ML) INTRAVENOUS
Status: DISCONTINUED | OUTPATIENT
Start: 2022-08-05 | End: 2022-08-05

## 2022-08-05 RX ORDER — ONDANSETRON 2 MG/ML
INJECTION INTRAMUSCULAR; INTRAVENOUS
Status: DISCONTINUED | OUTPATIENT
Start: 2022-08-05 | End: 2022-08-05

## 2022-08-05 RX ORDER — KETOROLAC TROMETHAMINE 30 MG/ML
INJECTION, SOLUTION INTRAMUSCULAR; INTRAVENOUS
Status: DISCONTINUED | OUTPATIENT
Start: 2022-08-05 | End: 2022-08-05

## 2022-08-05 RX ORDER — HYDROCODONE BITARTRATE AND ACETAMINOPHEN 5; 325 MG/1; MG/1
1 TABLET ORAL EVERY 6 HOURS PRN
Qty: 15 TABLET | Refills: 0 | Status: SHIPPED | OUTPATIENT
Start: 2022-08-05 | End: 2022-11-15 | Stop reason: ALTCHOICE

## 2022-08-05 RX ORDER — OXYCODONE HYDROCHLORIDE 5 MG/1
5 TABLET ORAL
Status: DISCONTINUED | OUTPATIENT
Start: 2022-08-05 | End: 2022-08-05 | Stop reason: HOSPADM

## 2022-08-05 RX ORDER — AMOXICILLIN 500 MG/1
500 TABLET, FILM COATED ORAL EVERY 12 HOURS
Qty: 10 TABLET | Refills: 0 | OUTPATIENT
Start: 2022-08-05 | End: 2022-08-10

## 2022-08-05 RX ORDER — GLYCOPYRROLATE 0.2 MG/ML
INJECTION INTRAMUSCULAR; INTRAVENOUS
Status: DISCONTINUED | OUTPATIENT
Start: 2022-08-05 | End: 2022-08-05

## 2022-08-05 RX ORDER — LIDOCAINE HYDROCHLORIDE AND EPINEPHRINE 10; 10 MG/ML; UG/ML
INJECTION, SOLUTION INFILTRATION; PERINEURAL
Status: DISCONTINUED | OUTPATIENT
Start: 2022-08-05 | End: 2022-08-05 | Stop reason: HOSPADM

## 2022-08-05 RX ORDER — MEPERIDINE HYDROCHLORIDE 25 MG/ML
12.5 INJECTION INTRAMUSCULAR; INTRAVENOUS; SUBCUTANEOUS EVERY 10 MIN PRN
Status: DISCONTINUED | OUTPATIENT
Start: 2022-08-05 | End: 2022-08-05

## 2022-08-05 RX ORDER — FENTANYL CITRATE 50 UG/ML
INJECTION, SOLUTION INTRAMUSCULAR; INTRAVENOUS
Status: DISCONTINUED | OUTPATIENT
Start: 2022-08-05 | End: 2022-08-05

## 2022-08-05 RX ADMIN — SODIUM CHLORIDE, POTASSIUM CHLORIDE, SODIUM LACTATE AND CALCIUM CHLORIDE: 600; 310; 30; 20 INJECTION, SOLUTION INTRAVENOUS at 06:08

## 2022-08-05 RX ADMIN — SUCCINYLCHOLINE CHLORIDE 60 MG: 20 INJECTION, SOLUTION INTRAMUSCULAR; INTRAVENOUS at 07:08

## 2022-08-05 RX ADMIN — PROPOFOL 50 MG: 10 INJECTION, EMULSION INTRAVENOUS at 07:08

## 2022-08-05 RX ADMIN — SUGAMMADEX 200 MG: 100 INJECTION, SOLUTION INTRAVENOUS at 09:08

## 2022-08-05 RX ADMIN — FENTANYL CITRATE 100 MCG: 50 INJECTION, SOLUTION INTRAMUSCULAR; INTRAVENOUS at 07:08

## 2022-08-05 RX ADMIN — SUCCINYLCHOLINE CHLORIDE 100 MG: 20 INJECTION, SOLUTION INTRAMUSCULAR; INTRAVENOUS at 07:08

## 2022-08-05 RX ADMIN — GLYCOPYRROLATE 0.2 MG: 0.2 INJECTION INTRAMUSCULAR; INTRAVENOUS at 07:08

## 2022-08-05 RX ADMIN — KETOROLAC TROMETHAMINE 30 MG: 30 INJECTION, SOLUTION INTRAMUSCULAR; INTRAVENOUS at 07:08

## 2022-08-05 RX ADMIN — ROCURONIUM BROMIDE 5 MG: 10 SOLUTION INTRAVENOUS at 07:08

## 2022-08-05 RX ADMIN — OXYCODONE HYDROCHLORIDE 5 MG: 5 TABLET ORAL at 10:08

## 2022-08-05 RX ADMIN — ROCURONIUM BROMIDE 45 MG: 10 SOLUTION INTRAVENOUS at 07:08

## 2022-08-05 RX ADMIN — CEFAZOLIN 2 G: 330 INJECTION, POWDER, FOR SOLUTION INTRAMUSCULAR; INTRAVENOUS at 07:08

## 2022-08-05 RX ADMIN — DEXAMETHASONE SODIUM PHOSPHATE 8 MG: 4 INJECTION, SOLUTION INTRA-ARTICULAR; INTRALESIONAL; INTRAMUSCULAR; INTRAVENOUS; SOFT TISSUE at 07:08

## 2022-08-05 RX ADMIN — LIDOCAINE HYDROCHLORIDE 50 MG: 20 INJECTION, SOLUTION EPIDURAL; INFILTRATION; INTRACAUDAL; PERINEURAL at 07:08

## 2022-08-05 RX ADMIN — ONDANSETRON 4 MG: 2 INJECTION INTRAMUSCULAR; INTRAVENOUS at 07:08

## 2022-08-05 RX ADMIN — SODIUM CHLORIDE, POTASSIUM CHLORIDE, SODIUM LACTATE AND CALCIUM CHLORIDE: 600; 310; 30; 20 INJECTION, SOLUTION INTRAVENOUS at 08:08

## 2022-08-05 RX ADMIN — PROPOFOL 150 MG: 10 INJECTION, EMULSION INTRAVENOUS at 07:08

## 2022-08-05 RX ADMIN — MIDAZOLAM 2 MG: 1 INJECTION INTRAMUSCULAR; INTRAVENOUS at 06:08

## 2022-08-05 NOTE — DISCHARGE INSTRUCTIONS
Tooth extraction aftercare  After your extraction, your dentist will give you a detailed list of post-surgical instructions. Here are some general guidelines for a speedy recovery:    Keep the extraction site clean. Gently rinse the area with an antimicrobial mouthwash two to three times a day. Avoid brushing directly over your extraction site until your dentist tells you its safe to do so. Brush and floss all other areas normally.  Take all medications as directed. Your dentist may prescribe antibiotics and pain relievers. Its important to take all of these medications exactly as directed. You can also take over-the-counter pain relievers, such as acetaminophen and ibuprofen.  Avoid strenuous activity for at least two days. An elevated heart rate can cause increased post-operative bleeding and discomfort. Skip the gym for the first 48 to 72 hours. Ask your dentist when its safe to resume normal routines.  What can I eat after a tooth extraction?  Avoid hard and crunchy foods for the first few days. Stock your fridge and pantry with soft foods like rice, pasta, eggs, yogurt and applesauce. Youll also want to avoid drinking through straws, as this can dislodge blood clots and cause dry sockets.    ABSOLUTELY NO SMOKING FOR 3 DAYS AFTER SURGERY

## 2022-08-05 NOTE — ANESTHESIA POSTPROCEDURE EVALUATION
Anesthesia Post Evaluation    Patient: Yaron Brennan    Procedure(s) Performed: Procedure(s) (LRB):  EXTRACTION, TEETH (N/A)  LARYNGOSCOPY, DIRECT (N/A)    Final Anesthesia Type: general      Patient location during evaluation: DOSC  Level of consciousness: awake  Post-procedure vital signs: reviewed and stable  Airway patency: patent      Anesthetic complications: no      Cardiovascular status: hemodynamically stable  Respiratory status: spontaneous ventilation  Follow-up not needed.          Vitals Value Taken Time   /91 08/05/22 1030   Temp 36.4 °C (97.5 °F) 08/05/22 1000   Pulse 84 08/05/22 1030   Resp 20 08/05/22 1030   SpO2 99 % 08/05/22 1030         Event Time   Out of Recovery 09:53:00         Pain/Renetta Score: Pain Rating Prior to Med Admin: 8 (8/5/2022 10:17 AM)  Renetta Score: 10 (8/5/2022 10:00 AM)

## 2022-08-05 NOTE — TRANSFER OF CARE
Anesthesia Transfer of Care Note    Patient: Yaron Brennan    Procedure(s) Performed: Procedure(s) (LRB):  EXTRACTION, TEETH (N/A)  LARYNGOSCOPY, DIRECT (N/A)    Patient location: PACU    Anesthesia Type: general    Transport from OR: Transported from OR on room air with adequate spontaneous ventilation    Post pain: adequate analgesia    Post assessment: no apparent anesthetic complications    Post vital signs: stable    Level of consciousness: awake and sedated    Nausea/Vomiting: no nausea/vomiting    Complications: none    Transfer of care protocol was followed      Last vitals:   Visit Vitals  /75 (BP Location: Right arm, Patient Position: Lying)   Pulse 88   Temp 36.3 °C (97.3 °F) (Temporal)   Resp 20   Wt 71 kg (156 lb 8.4 oz)   SpO2 100%   BMI 21.83 kg/m²

## 2022-08-05 NOTE — DISCHARGE SUMMARY
[facility] - Periop Services  Discharge Note  Short Stay    Procedure(s) (LRB):  EXTRACTION, TEETH (N/A)  LARYNGOSCOPY, DIRECT (N/A)    OUTCOME: Patient tolerated treatment/procedure well without complication and is now ready for discharge.    DISPOSITION: Home or Self Care    FINAL DIAGNOSIS: BOT SCCa    FOLLOWUP: In clinic    DISCHARGE INSTRUCTIONS:  No discharge procedures on file.     TIME SPENT ON DISCHARGE: 5 minutes

## 2022-08-08 VITALS
HEART RATE: 87 BPM | DIASTOLIC BLOOD PRESSURE: 85 MMHG | BODY MASS INDEX: 21.83 KG/M2 | WEIGHT: 156.5 LBS | OXYGEN SATURATION: 98 % | TEMPERATURE: 98 F | RESPIRATION RATE: 20 BRPM | SYSTOLIC BLOOD PRESSURE: 145 MMHG

## 2022-08-14 NOTE — PROGRESS NOTES
Past medical history:  Tobacco abuse since age 17. Intermittent alcohol use.  Procedure/surgical history: Back surgery (20-30 years ago, in use 10, apparently involving L5 fusion for prolapse this from work-related injury).  Social history:  Has been smoking a pack of cigarettes daily since age 17 years.  Has been drinking 6 beers daily for last 40 years.  Lately, trying to quit smoking and drinking.  Smokes marijuana.  .  Has 2 biological children of his own.  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  Lives in [place], [place]..  Family history:  Father  from some kind of cancer at age 72 years.  Health maintenance:  Does not have a regular primary care provider.  Does not visit with doctors.  No screening colonoscopy ever.      History of present illness:  57-year-old gentleman    He was admitted to [facility] 2022 with 2 months history of progressive dysphagia, odynophagia, soreness of tongue, 20 lb weight loss and bilateral neck masses.  Also, hoarseness.  CT neck showed ulcerated mass in the tongue base, malignant-appearing, along with lymph node metastasis.  ENT performed a laryngoscopy with biopsy of tongue mass.  Friable base of tongue mass.  No airway compromise.  Right upper lung lobe indistinct, ground-glass lung nodules, typical of a benign inflammatory etiology.  Discharged 2022.  Patient reported weight loss of> 20 lb in previous 2 months.    2022:  BUN 26. Creatinine 0.81, normal.  CMP essentially unremarkable.  WBC 14.0.  Hemoglobin 13.8.  Platelets 392 K. neutrophils 35%.  Lymphocytes 7%.      2022:  Panendoscopy ([facility]; Red Tay MD, ENT):  -progressive dysphagia, bilateral bulky cervical lymphadenopathy  -large friable base of tongue mass  -ulcerated friable mass base of the tongue that seems confined to base of tongue; lingual surface of  epiglottis is free from tumor; mass involves bilateral base of tongue, significantly midline    Pathology:  Tongue, base, biopsy:  -invasive squamous cell carcinoma, grade 3 of 4  -p16 +    07/05/2022: CT soft tissue of the neck without contrast:  -ulcerated tongue base mass, at least 3.4 cm by 3.0 cm x 3.1 cm; associated bulky level 2 and level 3 cervical lymphadenopathy; suspected level 1 and level 4 cervical lymphadenopathy; a right level 2 lymph node with central necrosis 2.6 x 3.3 cm; a left level 3 lymph node 4.5 x 3.2 cm; no significant airway compromise; no acute or aggressive bony lesions  -malignant ulcerated type mass with lymph node metastasis; no airway compromise; right upper lung lobe indistinct ground-glass pulmonary nodules typical of benign infectious or inflammatory etiology  -07/29/2022:  CT chest with contrast for staging:  No intrathoracic metastatic disease; small faint focus pulmonary ground-glass posterior right upper lobe; mild thickening of right lung major fissure posteriorly  -07/29/2022:  CT soft tissues of the neck with contrast (comparison:  07/05/2022):  1. Large ulcerated tongue base mass measuring at least 5 cm in diameter  2. 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  Pleasant gentleman.  In no acute discomfort.  40 lb weight loss in last 3 months.  Appetite is preserved.  Dysphagia.  No odynophagia.  Pain left side of throat overall the lymphadenopathy as well as during swallowing.  No hemoptysis, nausea, vomiting.  No dyspnea. Left-sided otalgia.  ECOG 0.   Chronic tobacco and alcohol abuse (see above).  Bilateral cervical lymphadenopathy started 3 months ago; slowly progressive.  -no unusual headaches, loss of consciousness, seizures, stroke-like symptoms,  fevers, or chills.  No abdominal pain, nausea, vomiting.      Interval history:    08/16/2022:  -07/29/2022:  CT chest with contrast for staging:  No intrathoracic metastatic disease; small faint focus pulmonary ground-glass posterior right upper lobe; mild thickening of right lung major fissure posteriorly  -07/29/2022:  CT soft tissues of the neck with contrast (comparison:  07/05/2022):  1. Large ulcerated tongue base mass measuring at least 5 cm in diameter  2. 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  Overall, doing not too bad.  Able to swallow solids and liquids.  No dysphagia or odynophagia.  No stridor or dyspnea.  Occasional small amount hemoptysis.  On narcotics for pain in the throat which is very well controlled.  No otalgia.  ECOG 1.  Awaiting PET-CT scan.      Review of systems:  All systems reviewed, and found to be negative except for the symptoms detailed above.      Physical examination:  VITAL SIGNS:  Reviewed.      GENERAL:  In no apparent distress.    HEAD:  No signs of head trauma.  EYES:  Pupils are equal.  Extraocular motions intact.    EARS:  Hearing grossly intact.  MOUTH:  Oropharynx is normal.   NECK:  No adenopathy, no JVD.     CHEST:  Chest with clear breath sounds bilaterally.  No wheezes, rales, or rhonchi.    CARDIAC:  Regular rate and rhythm.  S1 and S2, without murmurs, gallops, or rubs.  VASCULAR:  No Edema.  Peripheral pulses normal and equal in all extremities.  ABDOMEN:  Soft, without detectable tenderness.  No sign of distention.  No   rebound or guarding, and no masses palpated.   Bowel Sounds normal.  MUSCULOSKELETAL:  Good range of motion of all major joints. Extremities without clubbing, cyanosis or edema.    NEUROLOGIC EXAM:  Alert and oriented x 3.  No focal sensory or strength  deficits.   Speech normal.  Follows commands.  PSYCHIATRIC:  Mood normal.  SKIN:  No rash or lesions.  07/18/2022:  In no acute discomfort.  Bulky bilateral cervical lymphadenopathy is apparent.  Left cervical lymphadenopathy measures at least 7 cm x 6 cm, slightly tender.  Right cervical lymphadenopathy measures about 4 cm x 3 cm.  Lymphadenopathy is mobile; left lymphadenopathy less so.  No superficial ulceration.  08/16/2022: Bilateral cervical lymphadenopathy.      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, p16 +:  -presentation: 07/2022:  Dysphagia, odynophagia, 20 lb weight loss, bilateral neck masses, hoarseness  -CT soft tissues of the neck without contrast 07/05/2022, 07/29/2022  -S/P panendoscopy 07/06/2022  -ulcerated tongue base mass, at least 5 cm  -ulcerated friable mass base of the tongue, confined base of tongue on panendoscopy  -right level 2 lymph node 3.2 x 2.9 cm on CT  -left level 3 lymph node 5.1 x 4.2 cm on CT  -invasive squamous cell carcinoma, grade 3 of 4; p16 +  -no intrathoracic metastasis on CT chest  >>cT3 cN2 M0, clinical stage II      # history of tobacco abuse since age 17    # history of alcohol use      Plan:  PET-CT pending      -if no metastasis, then, treatment options:  1. Concurrent chemoradiation therapy; or  2. Resection of primary and ipsilateral or bilateral neck dissection; or  3. Induction chemotherapy (category 3 recommendation) followed by radiotherapy or chemoradiation therapy   >>>  -ENT agreed with concurrent chemoradiation therapy  -dental evaluation, nutrition/speech/swallow evaluation  We will treat with high-dose cisplatin concurrent with RT  -Cisplatin 100 mg/m² IV days 1, 22, and 23, concurrent with RT  -tentatively speaking, will re-stage with PET-CT 3 months post completion of chemoradiation therapy    -We will administer chemotherapy via PICC line  -Close monitoring of toxicity including hematologic, renal, electrolyte imbalance,  ototoxicity, neuropathy, etc.  -Check CBC, CMP, and magnesium level weekly  -Discussed potential side effects of chemotherapy and gave him educational materials from [employer]  -Formal chemotherapy teaching with nursing staff to follow  -Will refer to radiation oncology at this time  -We will coordinate chemotherapy with radiation oncology  -Close follow-up of clinical toxicity, counts, chemistry profile, and electrolytes; CBC, CMP, and magnesium level weekly.    -For follow-up after chemoradiation therapy, see below    -smoking cessation counseling:  Smoking and alcohol cessation discussed in detail.    Throat pain:  He has been taking Norco 7.5 mg every 6 hours p.r.n., with satisfactory pain relief; will refill  Have sent a consultation request to Internal Medicine for him to get established with a PCP.    Discussed potential side effects of chemotherapy and chemoradiation therapy including nausea, vomiting, nutritional compromise, kidney toxicity, electrolyte imbalance, ototoxicity, peripheral neuropathy, cytopenias, infections, need for blood transfusion, infections, sepsis, oral mucositis resulting in pain and nutritional compromise, dehydration, etc.    -chemotherapy teaching with nursing staff with a week or 10 days.    Above discussed with the patient and his wife.  All questions answered.  Discussed labs, scans, pathology report, and staging of oropharyngeal cancer, and gave him copies of reports.  Discussed plan of management in detail.  Discussed potential side effects of cisplatin and gave him educational materials from [facility].  He understands and agrees with this plan.  -------------------      Discussion:  Chemotherapy:   High-dose cisplatin: Days 1, 22, and 43: Cisplatin 100 mg/m² IV + concurrent RT    Post CRT neck evaluation:   4-8 weeks clinical assessment as appropriate:   1.  If residual primary, persistent disease, or progression: Then assess extent of disease or distant metastasis with CT or  MRI with contrast or FDG PET/CT; followed by resection   2.  If response: Then, assess extent of disease or distant metastasis with FDG PET/CT at minimum 12 weeks weeks (preferred), or CT of primary and neck and/or MRI with contrast at 8-12 weeks

## 2022-08-16 ENCOUNTER — OFFICE VISIT (OUTPATIENT)
Dept: HEMATOLOGY/ONCOLOGY | Facility: CLINIC | Age: 57
End: 2022-08-16
Payer: MEDICAID

## 2022-08-16 ENCOUNTER — OFFICE VISIT (OUTPATIENT)
Dept: OTOLARYNGOLOGY | Facility: CLINIC | Age: 57
End: 2022-08-16
Payer: MEDICAID

## 2022-08-16 ENCOUNTER — TELEPHONE (OUTPATIENT)
Dept: HEMATOLOGY/ONCOLOGY | Facility: CLINIC | Age: 57
End: 2022-08-16
Payer: MEDICAID

## 2022-08-16 VITALS
SYSTOLIC BLOOD PRESSURE: 138 MMHG | WEIGHT: 154.13 LBS | BODY MASS INDEX: 21.58 KG/M2 | OXYGEN SATURATION: 100 % | TEMPERATURE: 99 F | DIASTOLIC BLOOD PRESSURE: 91 MMHG | RESPIRATION RATE: 18 BRPM | HEIGHT: 71 IN | HEART RATE: 64 BPM

## 2022-08-16 VITALS
SYSTOLIC BLOOD PRESSURE: 121 MMHG | DIASTOLIC BLOOD PRESSURE: 76 MMHG | HEART RATE: 75 BPM | BODY MASS INDEX: 21.34 KG/M2 | WEIGHT: 153 LBS | TEMPERATURE: 99 F

## 2022-08-16 DIAGNOSIS — C77.0 SECONDARY MALIGNANT NEOPLASM OF CERVICAL LYMPH NODE: ICD-10-CM

## 2022-08-16 DIAGNOSIS — Z78.9 ALCOHOL USE: ICD-10-CM

## 2022-08-16 DIAGNOSIS — C01 SQUAMOUS CELL CARCINOMA OF BASE OF TONGUE: Primary | ICD-10-CM

## 2022-08-16 DIAGNOSIS — Z72.0 TOBACCO ABUSE: ICD-10-CM

## 2022-08-16 PROCEDURE — 3075F PR MOST RECENT SYSTOLIC BLOOD PRESS GE 130-139MM HG: ICD-10-PCS | Mod: CPTII,,, | Performed by: INTERNAL MEDICINE

## 2022-08-16 PROCEDURE — 1159F MED LIST DOCD IN RCRD: CPT | Mod: CPTII,,, | Performed by: INTERNAL MEDICINE

## 2022-08-16 PROCEDURE — 99213 OFFICE O/P EST LOW 20 MIN: CPT | Mod: PBBFAC,27 | Performed by: INTERNAL MEDICINE

## 2022-08-16 PROCEDURE — 3080F PR MOST RECENT DIASTOLIC BLOOD PRESSURE >= 90 MM HG: ICD-10-PCS | Mod: CPTII,,, | Performed by: INTERNAL MEDICINE

## 2022-08-16 PROCEDURE — 3080F DIAST BP >= 90 MM HG: CPT | Mod: CPTII,,, | Performed by: INTERNAL MEDICINE

## 2022-08-16 PROCEDURE — 1159F PR MEDICATION LIST DOCUMENTED IN MEDICAL RECORD: ICD-10-PCS | Mod: CPTII,,, | Performed by: INTERNAL MEDICINE

## 2022-08-16 PROCEDURE — 99214 PR OFFICE/OUTPT VISIT, EST, LEVL IV, 30-39 MIN: ICD-10-PCS | Mod: S$PBB,,, | Performed by: INTERNAL MEDICINE

## 2022-08-16 PROCEDURE — 3008F PR BODY MASS INDEX (BMI) DOCUMENTED: ICD-10-PCS | Mod: CPTII,,, | Performed by: INTERNAL MEDICINE

## 2022-08-16 PROCEDURE — 99214 OFFICE O/P EST MOD 30 MIN: CPT | Mod: S$PBB,,, | Performed by: INTERNAL MEDICINE

## 2022-08-16 PROCEDURE — 1160F RVW MEDS BY RX/DR IN RCRD: CPT | Mod: CPTII,,, | Performed by: INTERNAL MEDICINE

## 2022-08-16 PROCEDURE — 3008F BODY MASS INDEX DOCD: CPT | Mod: CPTII,,, | Performed by: INTERNAL MEDICINE

## 2022-08-16 PROCEDURE — 99213 OFFICE O/P EST LOW 20 MIN: CPT | Mod: PBBFAC

## 2022-08-16 PROCEDURE — 3075F SYST BP GE 130 - 139MM HG: CPT | Mod: CPTII,,, | Performed by: INTERNAL MEDICINE

## 2022-08-16 PROCEDURE — 1160F PR REVIEW ALL MEDS BY PRESCRIBER/CLIN PHARMACIST DOCUMENTED: ICD-10-PCS | Mod: CPTII,,, | Performed by: INTERNAL MEDICINE

## 2022-08-16 RX ORDER — SODIUM CHLORIDE 0.9 % (FLUSH) 0.9 %
10 SYRINGE (ML) INJECTION
Status: CANCELLED | OUTPATIENT
Start: 2022-08-26

## 2022-08-16 RX ORDER — HEPARIN 100 UNIT/ML
500 SYRINGE INTRAVENOUS
Status: CANCELLED | OUTPATIENT
Start: 2022-08-25

## 2022-08-16 RX ORDER — HEPARIN 100 UNIT/ML
500 SYRINGE INTRAVENOUS
Status: CANCELLED | OUTPATIENT
Start: 2022-08-26

## 2022-08-16 RX ORDER — SODIUM CHLORIDE 0.9 % (FLUSH) 0.9 %
10 SYRINGE (ML) INJECTION
Status: CANCELLED | OUTPATIENT
Start: 2022-08-25

## 2022-08-16 NOTE — PROGRESS NOTES
[facility]  Otolaryngology Clinic Note    Yaron Brennan  Encounter Date: 8/16/2022  YOB: 1965  Physician: Blaire Clarke    Chief Complaint: Otalgia, neck mass    HPI: Yaron Brennan is a 57 y.o. male with HTN who presents as referral from Dr. Welsh for squamous cell carcinoma of base of tongue. Patient reports that toward the beginning of the year he started to have worsening left otalgia. He then noticed a bump/swelling on left neck that he thought was was due to a bug bite. This got progressively bigger over time and he then noticed some swelling on right neck as well. He eventually presented to [facility] in early July 2022. He had a CT neck that showed an ulcerated mass in the tongue base, and underwent direct laryngoscopy with ENT on 7/6/22 (Dr. Tay). Operative findings: ulcerated friable mass base of the tongue that seems confined to base of tongue; lingual surface of epiglottis is free from tumor; mass involves bilateral base of tongue, significantly midline. Results returned positive for p16+ SCCa. Today in clinic patient reports bilateral L>R otalgia, mild dysphagia/odynophagia. He reports he eats basically whatever he wants without overt signs of aspiration. He does report 40lb weight loss in the last 6 months. + Neck LAD. Reports voice changes. Denies any issues breathing. Smoked 1ppd since 17 years old (40 years) but quit a month ago when he got his diagnosis. Also previously drank 12 pack of beer a day and likewise quite a month ago. Referred to ENT for consideration of possible surgical resection.     07/05/2022: CT soft tissue of the neck without contrast:  -ulcerated tongue base mass, at least 3.4 cm by 3.0 cm x 3.1 cm; associated bulky level 2 and level 3 cervical lymphadenopathy; suspected level 1 and level 4 cervical lymphadenopathy; a right level 2 lymph node with central necrosis 2.6 x 3.3 cm; a left level 3 lymph node 4.5 x 3.2 cm; no  significant airway compromise; no acute or aggressive bony lesions  -malignant ulcerated type mass with lymph node metastasis; no airway compromise; right upper lung lobe indistinct ground-glass pulmonary nodules typical of benign infectious or inflammatory etiology    22: Here today for follow up. Had dental extractions performed on . Not having any issues. Taking in 4-5 ensures daily. Trying to take some other soft foods like grits and pudding.     ROS:   General: Negative except per HPI  Skin: Denies rash, ulcer, or lesion.  Eyes: Denies vision changes or diplopia.  Ears: Negative except per HPI  Nose: Negative except per HPI  Throat/mouth: Negative except per HPI  Cardiovascular: Negative except per HPI  Respiratory: Negative except per HPI  Neck: Negative except per HPI  Endocrine: Negative except per HPI  Neurologic: Negative except per HPI    Other 10-point review of systems negative except per HPI      Review of patient's allergies indicates:  No Known Allergies    Past Medical History:   Diagnosis Date    Cancer     Hypertension        Past Surgical History:   Procedure Laterality Date    BACK SURGERY      DIRECT LARYNGOSCOPY N/A 2022    Procedure: LARYNGOSCOPY, DIRECT;  Surgeon: Mekhi Grossman MD;  Location: [facility];  Service: ENT;  Laterality: N/A;    EXTRACTION OF TOOTH N/A 2022    Procedure: EXTRACTION, TEETH;  Surgeon: Mekhi Grossman MD;  Location: [facility];  Service: ENT;  Laterality: N/A;    SPINE SURGERY      Not sure of date maybe in        Social History     Socioeconomic History    Marital status:    Tobacco Use    Smoking status: Former Smoker     Packs/day: 1.50     Years: 35.00     Pack years: 52.50     Types: Cigarettes     Start date:      Quit date: 2022     Years since quittin.1    Smokeless tobacco: Never Used    Tobacco comment: Been almost 35 yr   Substance and Sexual Activity    Alcohol use: Not Currently    Drug use: Yes      Frequency: 3.0 times per week     Types: Marijuana     Comment: Daily    Sexual activity: Yes     Partners: Female     Birth control/protection: None       Family History   Problem Relation Age of Onset    Hypertension Mother     Cancer Father     Hypertension Sister     Hypertension Brother        Outpatient Encounter Medications as of 8/16/2022   Medication Sig Dispense Refill    amLODIPine (NORVASC) 2.5 MG tablet Take 2.5 mg by mouth once daily.      gabapentin (NEURONTIN) 100 MG capsule Take 1 capsule (100 mg total) by mouth 3 (three) times daily. 90 capsule 11    HYDROcodone-acetaminophen (NORCO) 5-325 mg per tablet Take 1 tablet by mouth every 6 (six) hours as needed for Pain. 15 tablet 0     Facility-Administered Encounter Medications as of 8/16/2022   Medication Dose Route Frequency Provider Last Rate Last Admin    lactated ringers infusion   Intravenous Continuous Elzbieta Quesada MD 10 mL/hr at 08/05/22 0808 New Bag at 08/05/22 0808    LIDOcaine (PF) 10 mg/ml (1%) injection 10 mg  1 mL Intradermal Once SANDY Levy        sodium chloride 0.9% flush 10 mL  10 mL Intravenous PRN Anna Taylor MD           Physical Exam:  Vitals:    08/16/22 1131   BP: 121/76   Pulse: 75   Temp: 99 °F (37.2 °C)   Weight: 69.4 kg (153 lb)       Constitutional  General Appearance: well nourished, well-developed, AAO x3, NAD; voice muffled/hot potato voice  HEENT  Eyes: PEERLA, EOMI, normal conjunctivae  Ears: Hearing well at conversation level;    AD: auricle normal,cerumen impaction   AS: auricle normal, cerumen impaction   Vestibular: ambulates without gait disturbance  Nose: septum midline, no inferior turbinate hypertrophy, no polyps, moist mucosa without erythema or blue hue  OC/OP: dentition poor, no oral lesions, tongue/FOM/BOT- soft, no leukoplakia/ulcerations/ tenderness, good extension  Nasopharynx, Hypopharynx, and Larynx:    Indirect: attempted, limited view due to patient  intolerance  Neck: Left level III/IV 5-6cm lymph node, non tender, firm; right level II 3cm lymph node  Neuro: CN II - XII intact bilaterally  Cardiovascular: peripheral pulses palpable  Respiratory: non-labored respirations  Psychiatric: oriented to time, place and person, no depression, anxiety or agitation    Pertinent Data:  ? LABS:  ? AUDIO:  ? CT Scan:    Imaging:   I personally reviewed the following images:    Summary of Outside Records:      Assessment/Plan:  Yaron Brennan is a 57 y.o. male with [id number] p16 (+) squamous cell carcinoma of base of tongue with bilateral neck metastasis [facility].     - Discussed at tumor board and recommended for chemo radiation due to size.  Too large for adequate surgical resection with clear margins without causing significant morbidity to his swallowing, especially given that his current swallowing function appears to be intact.   - He has an appointment with Dr. Welsh this afternoon and Dr. Leavitt on Thursday 8/18.   - Not an airway concern at this time, but did discuss that should there be deterioration of his swallowing or airway status as a result of swelling or bleeding that we would potentially need temporary placement of a tracheostomy and/or PEG tube. Patient is agreeable to this.   - We will follow up to ensure patient has started treatment. Otherwise will plan to see patient back in 2 months after completion of therapy. Encouraged patient to call sooner if he is having any acute issues for which we can get him in sooner.

## 2022-08-16 NOTE — TELEPHONE ENCOUNTER
Orders for today:     -high dose cisplatin concurrent with radiotherapy, to start when radiotherapy starts    -chemotherapy teaching with nursing staff with a week or 10 days.

## 2022-08-16 NOTE — PLAN OF CARE
START ON PATHWAY REGIMEN - Head and Neck    [id number]        Cisplatin           Additional Orders: Chemotherapy given concurrently with radiation.    **Always confirm dose/schedule in your pharmacy ordering system**    Patient Characteristics:  Oropharynx, HPV Positive, Preoperative or Nonsurgical Candidate (Clinical   Staging), cT0-4, cN1-3 or cT3-4, cN0  Disease Classification: Oropharynx  HPV Status: Positive (+)  Therapeutic Status: Preoperative or Nonsurgical Candidate (Clinical Staging)  AJCC T Category: cT3  AJCC 8 Stage Grouping: II  AJCC N Category: cN2  AJCC M Category: cM0  Intent of Therapy:  Curative Intent, Discussed with Patient

## 2022-08-17 ENCOUNTER — DOCUMENTATION ONLY (OUTPATIENT)
Dept: HEMATOLOGY/ONCOLOGY | Facility: CLINIC | Age: 57
End: 2022-08-17
Payer: MEDICAID

## 2022-08-17 NOTE — NURSING
Met with patient on 8/16/22 after his consult appointment with Dr. Welsh. Patient attended appointment with his wife, Blanca. Provided patient with NN contact and explained NN role in patient's care.    NCCN Distress Screen completed with a reported distress score of 4. Patient indicates his distress is related to recently no being able to work and his diagnosis. Reports he has resources of SNAP and insurance benefits. Social supports reported as adequate. Patient says that he enjoys mowing the grass and watching TV to help him cope. Resources folder with written information of community resources, transportation, and financial services given to patient. Financial recources reviewed in detail. Says that he has started the application for SSID and is now receiving SNAP benefits. Informed of [facility] and [employer] referral that he may need to utilize during the course of his treatment. Discussed nutritional supplements through [facility]. Patient verbalized understanding and agreed to be referred. Patient agrees to contact NN if any needs or concerns arise. No further needs identified at this time.

## 2022-08-23 ENCOUNTER — APPOINTMENT (OUTPATIENT)
Dept: HEMATOLOGY/ONCOLOGY | Facility: CLINIC | Age: 57
End: 2022-08-23
Payer: MEDICAID

## 2022-08-23 ENCOUNTER — OFFICE VISIT (OUTPATIENT)
Dept: HEMATOLOGY/ONCOLOGY | Facility: CLINIC | Age: 57
End: 2022-08-23
Payer: MEDICAID

## 2022-08-23 VITALS
WEIGHT: 153.25 LBS | OXYGEN SATURATION: 97 % | HEIGHT: 71 IN | HEART RATE: 74 BPM | BODY MASS INDEX: 21.45 KG/M2 | DIASTOLIC BLOOD PRESSURE: 90 MMHG | TEMPERATURE: 98 F | RESPIRATION RATE: 20 BRPM | SYSTOLIC BLOOD PRESSURE: 127 MMHG

## 2022-08-23 DIAGNOSIS — C01 SQUAMOUS CELL CARCINOMA OF BASE OF TONGUE: ICD-10-CM

## 2022-08-23 DIAGNOSIS — C01 SQUAMOUS CELL CARCINOMA OF BASE OF TONGUE: Primary | ICD-10-CM

## 2022-08-23 LAB
ALBUMIN SERPL-MCNC: 3.4 GM/DL (ref 3.5–5)
ALBUMIN/GLOB SERPL: 0.9 RATIO (ref 1.1–2)
ALP SERPL-CCNC: 69 UNIT/L (ref 40–150)
ALT SERPL-CCNC: 11 UNIT/L (ref 0–55)
AST SERPL-CCNC: 15 UNIT/L (ref 5–34)
BASOPHILS # BLD AUTO: 0.04 X10(3)/MCL (ref 0–0.2)
BASOPHILS NFR BLD AUTO: 0.4 %
BILIRUBIN DIRECT+TOT PNL SERPL-MCNC: 0.3 MG/DL
BUN SERPL-MCNC: 14 MG/DL (ref 8.4–25.7)
CALCIUM SERPL-MCNC: 9.2 MG/DL (ref 8.4–10.2)
CHLORIDE SERPL-SCNC: 100 MMOL/L (ref 98–107)
CO2 SERPL-SCNC: 31 MMOL/L (ref 22–29)
CREAT SERPL-MCNC: 0.87 MG/DL (ref 0.73–1.18)
EOSINOPHIL # BLD AUTO: 0.17 X10(3)/MCL (ref 0–0.9)
EOSINOPHIL NFR BLD AUTO: 1.8 %
ERYTHROCYTE [DISTWIDTH] IN BLOOD BY AUTOMATED COUNT: 12.5 % (ref 11.5–17)
GFR SERPLBLD CREATININE-BSD FMLA CKD-EPI: >60 MLS/MIN/1.73/M2
GLOBULIN SER-MCNC: 3.7 GM/DL (ref 2.4–3.5)
GLUCOSE SERPL-MCNC: 125 MG/DL (ref 74–100)
HCT VFR BLD AUTO: 34.3 % (ref 42–52)
HGB BLD-MCNC: 11.4 GM/DL (ref 14–18)
IMM GRANULOCYTES # BLD AUTO: 0.03 X10(3)/MCL (ref 0–0.04)
IMM GRANULOCYTES NFR BLD AUTO: 0.3 %
LYMPHOCYTES # BLD AUTO: 1.33 X10(3)/MCL (ref 0.6–4.6)
LYMPHOCYTES NFR BLD AUTO: 14.4 %
MCH RBC QN AUTO: 30.4 PG (ref 27–31)
MCHC RBC AUTO-ENTMCNC: 33.2 MG/DL (ref 33–36)
MCV RBC AUTO: 91.5 FL (ref 80–94)
MONOCYTES # BLD AUTO: 1.06 X10(3)/MCL (ref 0.1–1.3)
MONOCYTES NFR BLD AUTO: 11.4 %
NEUTROPHILS # BLD AUTO: 6.6 X10(3)/MCL (ref 2.1–9.2)
NEUTROPHILS NFR BLD AUTO: 71.7 %
NRBC BLD AUTO-RTO: 0 %
PLATELET # BLD AUTO: 428 X10(3)/MCL (ref 130–400)
PMV BLD AUTO: 9.1 FL (ref 7.4–10.4)
POTASSIUM SERPL-SCNC: 4.1 MMOL/L (ref 3.5–5.1)
PROT SERPL-MCNC: 7.1 GM/DL (ref 6.4–8.3)
RBC # BLD AUTO: 3.75 X10(6)/MCL (ref 4.7–6.1)
SODIUM SERPL-SCNC: 139 MMOL/L (ref 136–145)
WBC # SPEC AUTO: 9.3 X10(3)/MCL (ref 4.5–11.5)

## 2022-08-23 PROCEDURE — 3080F PR MOST RECENT DIASTOLIC BLOOD PRESSURE >= 90 MM HG: ICD-10-PCS | Mod: CPTII,,, | Performed by: NURSE PRACTITIONER

## 2022-08-23 PROCEDURE — 3074F PR MOST RECENT SYSTOLIC BLOOD PRESSURE < 130 MM HG: ICD-10-PCS | Mod: CPTII,,, | Performed by: NURSE PRACTITIONER

## 2022-08-23 PROCEDURE — 3080F DIAST BP >= 90 MM HG: CPT | Mod: CPTII,,, | Performed by: NURSE PRACTITIONER

## 2022-08-23 PROCEDURE — 1160F PR REVIEW ALL MEDS BY PRESCRIBER/CLIN PHARMACIST DOCUMENTED: ICD-10-PCS | Mod: CPTII,,, | Performed by: NURSE PRACTITIONER

## 2022-08-23 PROCEDURE — 3074F SYST BP LT 130 MM HG: CPT | Mod: CPTII,,, | Performed by: NURSE PRACTITIONER

## 2022-08-23 PROCEDURE — 1160F RVW MEDS BY RX/DR IN RCRD: CPT | Mod: CPTII,,, | Performed by: NURSE PRACTITIONER

## 2022-08-23 PROCEDURE — 36415 COLL VENOUS BLD VENIPUNCTURE: CPT

## 2022-08-23 PROCEDURE — 1159F PR MEDICATION LIST DOCUMENTED IN MEDICAL RECORD: ICD-10-PCS | Mod: CPTII,,, | Performed by: NURSE PRACTITIONER

## 2022-08-23 PROCEDURE — 99214 OFFICE O/P EST MOD 30 MIN: CPT | Mod: PBBFAC | Performed by: NURSE PRACTITIONER

## 2022-08-23 PROCEDURE — 3008F BODY MASS INDEX DOCD: CPT | Mod: CPTII,,, | Performed by: NURSE PRACTITIONER

## 2022-08-23 PROCEDURE — 1159F MED LIST DOCD IN RCRD: CPT | Mod: CPTII,,, | Performed by: NURSE PRACTITIONER

## 2022-08-23 PROCEDURE — 3008F PR BODY MASS INDEX (BMI) DOCUMENTED: ICD-10-PCS | Mod: CPTII,,, | Performed by: NURSE PRACTITIONER

## 2022-08-23 PROCEDURE — 80053 COMPREHEN METABOLIC PANEL: CPT

## 2022-08-23 PROCEDURE — 99215 PR OFFICE/OUTPT VISIT, EST, LEVL V, 40-54 MIN: ICD-10-PCS | Mod: S$PBB,,, | Performed by: NURSE PRACTITIONER

## 2022-08-23 PROCEDURE — 99215 OFFICE O/P EST HI 40 MIN: CPT | Mod: S$PBB,,, | Performed by: NURSE PRACTITIONER

## 2022-08-23 RX ORDER — ONDANSETRON 8 MG/1
8 TABLET, ORALLY DISINTEGRATING ORAL EVERY 8 HOURS PRN
Qty: 90 TABLET | Refills: 3 | Status: SHIPPED | OUTPATIENT
Start: 2022-08-23 | End: 2023-07-19

## 2022-08-23 NOTE — PROGRESS NOTES
Problem List:    Current Treatment:  -Cisplatin 100 mg/m² IV days 1, 22, and 23, concurrent with RT  Start 2022    Treatment History:      Past medical history:  Tobacco abuse since age 17. Intermittent alcohol use.  Procedure/surgical history: Back surgery (20-30 years ago, in use 10, apparently involving L5 fusion for prolapse this from work-related injury).  Social history:  Has been smoking a pack of cigarettes daily since age 17 years.  Has been drinking 6 beers daily for last 40 years.  Lately, trying to quit smoking and drinking.  Smokes marijuana.  .  Has 2 biological children of his own.  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  Lives in [place], [place]..  Family history:  Father  from some kind of cancer at age 72 years.  Health maintenance:  Does not have a regular primary care provider.  Does not visit with doctors.  No screening colonoscopy ever.       History of Present illness:  57-year-old gentleman     He was admitted to [facility] 2022 with 2 months history of progressive dysphagia, odynophagia, soreness of tongue, 20 lb weight loss and bilateral neck masses.  Also, hoarseness.  CT neck showed ulcerated mass in the tongue base, malignant-appearing, along with lymph node metastasis.  ENT performed a laryngoscopy with biopsy of tongue mass.  Friable base of tongue mass.  No airway compromise.  Right upper lung lobe indistinct, ground-glass lung nodules, typical of a benign inflammatory etiology.  Discharged 2022.  Patient reported weight loss of> 20 lb in previous 2 months.    Interval History 2022: Patient presents with his wife for scheduled chemotherapy teaching. Discussed with patient treatment regimen along with potential side effects. We also discussed treatment options for any presenting symptoms. Patient consents to starting Cisplatin High dose concurrent with radiation on  8/25/2022. Patient reports that he starts radiation tomorrow 8/24/2022    ROS:  Constitutional: No fever, chills, weight loss, weakness or fatigue  Eye: No visual disturbances or changes in vision, no double vision  ENMT: no decreased hearing, nasal congestion, nosebleeds, sore throat, taste disturbance, tinnitus, vertigo  Respiratory: No shortness of breath, cough, sputum production, hemoptysis or pleuritic type chest pain  Cardiovascular: No chest pain, palpitations, syncope, leg swelling  Gastrointestinal: No nausea, vomiting, diarrhea, constipation, heartburn, abdominal pain  Genitourinary: No CVA tenderness  Hematology/lymph: no abnormal bruising, hemorrhage, petechiae swollen lymph glands  Musculoskeletal: No back or neck pain, joint pain, muscle pain or decreased range of motion  Integumentary: No rash, pruritus, skin lesion or any other significant skin complaints  Neurologic: alert and oriented x4, no abnormal balance, confusion, numbness, tingling, headache  Psychiatric: No anxiety, depression, suicidal or homicidal ideations  12 point ROS done in full with pertinent positives as described in interval history. Remainder of ROS are negative.     Patient Instructions  Discussed:  *Goals of therapy to be:  * Premedication  *Chemotherapy agents  Chemotherapy education:   Discussed common side effects to include nausea and vomiting, which is quite manageable, alopecia, taste changes, mouth ulcers, fatigue, fertility issues, low blood counts that may require transfusion, peripheral neuropathy (42% to 70%; grade 3/4: Less than 7%) arthralgia and or myalgias, and rare side effects including cardiac toxicity.  Discussed chemotherapy (Cisplatin ) to be given every weeks for cycles.   Notify our office for any problems or concerns, specifically shortness of breath, swelling, chest pain or fast heartbeat, intractable pain or nausea and vomiting, fever greater than 100.4, extreme fatigue or weakness.  Self-care tips  include good hygiene in frequent handwashing to avoid infection, anti-nausea medicines to reduce nausea, soft toothbrush and mouth rinses 3 times a day with 1 teaspoon of baking soda with 8 ounces of water to prevent and treat mouth sores, avoid contact sports, avoid sun exposure and apply sunblock with SPF 30 or higher, drink 2-3 quarts of water every 24 hours, and maintaining good nutrition.  * Short term and long term effects of treatment  * Signs and symptoms to call clinic--->Notify our office for any problems or concerns, specifically shortness of breath, swelling, chest pain or fast heartbeat, intractable pain or nausea and vomiting, fever greater than 100.4, extreme fatigue or weakness.  * Written material given to patient  * All questions answered; patient verbalized understanding.      Lab Results   Component Value Date    WBC 9.3 08/23/2022    HGB 11.4 (L) 08/23/2022    HCT 34.3 (L) 08/23/2022    MCV 91.5 08/23/2022     (H) 08/23/2022       CMP  Sodium Level   Date Value Ref Range Status   08/23/2022 139 136 - 145 mmol/L Final     Potassium Level   Date Value Ref Range Status   08/23/2022 4.1 3.5 - 5.1 mmol/L Final     Carbon Dioxide   Date Value Ref Range Status   08/23/2022 31 (H) 22 - 29 mmol/L Final     Blood Urea Nitrogen   Date Value Ref Range Status   08/23/2022 14.0 8.4 - 25.7 mg/dL Final     Creatinine   Date Value Ref Range Status   08/23/2022 0.87 0.73 - 1.18 mg/dL Final     Calcium Level Total   Date Value Ref Range Status   08/23/2022 9.2 8.4 - 10.2 mg/dL Final     Albumin Level   Date Value Ref Range Status   08/23/2022 3.4 (L) 3.5 - 5.0 gm/dL Final     Bilirubin Total   Date Value Ref Range Status   08/23/2022 0.3 <=1.5 mg/dL Final     Alkaline Phosphatase   Date Value Ref Range Status   08/23/2022 69 40 - 150 unit/L Final     Aspartate Aminotransferase   Date Value Ref Range Status   08/23/2022 15 5 - 34 unit/L Final     Alanine Aminotransferase   Date Value Ref Range Status    08/23/2022 11 0 - 55 unit/L Final     Estimated GFR-Non    Date Value Ref Range Status   07/18/2022 >60 mls/min/1.73/m2 Final       Physical Exam    Vitals & Measurements  Vitals:    08/23/22 1315   BP: (!) 127/90   Pulse: 74   Resp: 20   Temp: 98.2 °F (36.8 °C)       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, p16 +:  -presentation: 07/2022:  Dysphagia, odynophagia, 20 lb weight loss, bilateral neck masses, hoarseness  -CT soft tissues of the neck without contrast 07/05/2022, 07/29/2022  -S/P panendoscopy 07/06/2022  -ulcerated tongue base mass, at least 5 cm  -ulcerated friable mass base of the tongue, confined base of tongue on panendoscopy  -right level 2 lymph node 3.2 x 2.9 cm on CT  -left level 3 lymph node 5.1 x 4.2 cm on CT  -invasive squamous cell carcinoma, grade 3 of 4; p16 +  -no intrathoracic metastasis on CT chest  >>cT3 cN2 M0, clinical stage II        # history of tobacco abuse since age 17     # history of alcohol use    Plan:   -ENT agreed with concurrent chemoradiation therapy  -dental evaluation, nutrition/speech/swallow evaluation  We will treat with high-dose cisplatin concurrent with RT  -Cisplatin 100 mg/m² IV days 1, 22, and 23, concurrent with RT  -tentatively speaking, will re-stage with PET-CT 3 months post completion of chemoradiation therapy    PET-CT pending     -Okay to proceed with cycle 1 of Cisplatin on 8/25/2022 via picc line  -Return to infusion on 8/26/2022 for hydration only  -Follow up with Np in 1 week post cycle 1 day 1 on 9/1/2022 with lab work prior to hydration and picc line dressing change in infusion  -We will administer chemotherapy via PICC line  -Close monitoring of toxicity including hematologic, renal, electrolyte imbalance, ototoxicity, neuropathy, etc.  -Check CBC, CMP, and magnesium level weekly     -For follow-up after chemoradiation therapy, see below  -if no metastasis, then, treatment options:  1. Concurrent chemoradiation  therapy; or  2. Resection of primary and ipsilateral or bilateral neck dissection; or  3. Induction chemotherapy (category 3 recommendation) followed by radiotherapy or chemoradiation therapy   >>>    -Close follow-up of clinical toxicity, counts, chemistry profile, and electrolytes; CBC, CMP, and magnesium level weekly.     -smoking cessation counseling:  Smoking and alcohol cessation discussed in detail.     Throat pain:  He has been taking Norco 7.5 mg every 6 hours p.r.n., with satisfactory pain relief; managed by Radiation   Have sent a consultation request to Internal Medicine for him to get established with a PCP.     Discussed potential side effects of chemotherapy and chemoradiation therapy including nausea, vomiting, nutritional compromise, kidney toxicity, electrolyte imbalance, ototoxicity, peripheral neuropathy, cytopenias, infections, need for blood transfusion, infections, sepsis, oral mucositis resulting in pain and nutritional compromise, dehydration, etc.        Above discussed with the patient and his wife.  All questions answered.  Discussed plan of management in detail.  Discussed potential side effects of cisplatin and gave him educational materials from [facility].  He understands and agrees with this plan.  -------------------     Discussion:  Chemotherapy:   High-dose cisplatin: Days 1, 22, and 43: Cisplatin 100 mg/m² IV + concurrent RT     Post CRT neck evaluation:   4-8 weeks clinical assessment as appropriate:   1.  If residual primary, persistent disease, or progression: Then assess extent of disease or distant metastasis with CT or MRI with contrast or FDG PET/CT; followed by resection   2.  If response: Then, assess extent of disease or distant metastasis with FDG PET/CT at minimum 12 weeks weeks (preferred), or CT of primary and neck and/or MRI with contrast at 8-12 weeks

## 2022-08-23 NOTE — Clinical Note
Infusion on 8/25/2022 no lab work needed Infusion 8/26/2022 hydration  Follow up with np on 9/1/2022 with lab work toxicity check + infusion for picc line dressing change and hydration

## 2022-08-24 ENCOUNTER — DOCUMENTATION ONLY (OUTPATIENT)
Dept: INFUSION THERAPY | Facility: HOSPITAL | Age: 57
End: 2022-08-24
Payer: MEDICAID

## 2022-08-24 DIAGNOSIS — C01 SQUAMOUS CELL CARCINOMA OF BASE OF TONGUE: Primary | ICD-10-CM

## 2022-08-24 NOTE — PROGRESS NOTES
Released Day 1 Pre Cycle order for audiogram, after verbal OK given verbal OK by Jigna Kearney NP.

## 2022-08-25 ENCOUNTER — INFUSION (OUTPATIENT)
Dept: INFUSION THERAPY | Facility: HOSPITAL | Age: 57
End: 2022-08-25
Attending: INTERNAL MEDICINE
Payer: MEDICAID

## 2022-08-25 ENCOUNTER — HOSPITAL ENCOUNTER (OUTPATIENT)
Dept: RADIOLOGY | Facility: HOSPITAL | Age: 57
Discharge: HOME | End: 2022-08-25
Attending: INTERNAL MEDICINE
Payer: MEDICAID

## 2022-08-25 ENCOUNTER — CLINICAL SUPPORT (OUTPATIENT)
Dept: HEMATOLOGY/ONCOLOGY | Facility: CLINIC | Age: 57
End: 2022-08-25
Payer: MEDICAID

## 2022-08-25 ENCOUNTER — DOCUMENTATION ONLY (OUTPATIENT)
Dept: HEMATOLOGY/ONCOLOGY | Facility: CLINIC | Age: 57
End: 2022-08-25
Payer: MEDICAID

## 2022-08-25 VITALS
DIASTOLIC BLOOD PRESSURE: 83 MMHG | SYSTOLIC BLOOD PRESSURE: 122 MMHG | RESPIRATION RATE: 20 BRPM | HEIGHT: 71 IN | TEMPERATURE: 99 F | OXYGEN SATURATION: 96 % | WEIGHT: 148.38 LBS | BODY MASS INDEX: 20.77 KG/M2 | HEART RATE: 73 BPM

## 2022-08-25 DIAGNOSIS — C01 SQUAMOUS CELL CARCINOMA OF BASE OF TONGUE: Primary | ICD-10-CM

## 2022-08-25 DIAGNOSIS — C01 SQUAMOUS CELL CARCINOMA OF BASE OF TONGUE: ICD-10-CM

## 2022-08-25 PROCEDURE — 96365 THER/PROPH/DIAG IV INF INIT: CPT | Mod: 59

## 2022-08-25 PROCEDURE — 96368 THER/DIAG CONCURRENT INF: CPT | Mod: 59

## 2022-08-25 PROCEDURE — 96413 CHEMO IV INFUSION 1 HR: CPT

## 2022-08-25 PROCEDURE — 99211 OFF/OP EST MAY X REQ PHY/QHP: CPT | Mod: PBBFAC,25

## 2022-08-25 PROCEDURE — 63600175 PHARM REV CODE 636 W HCPCS: Performed by: INTERNAL MEDICINE

## 2022-08-25 PROCEDURE — 96360 HYDRATION IV INFUSION INIT: CPT | Mod: 59

## 2022-08-25 PROCEDURE — 96375 TX/PRO/DX INJ NEW DRUG ADDON: CPT

## 2022-08-25 PROCEDURE — 96366 THER/PROPH/DIAG IV INF ADDON: CPT

## 2022-08-25 PROCEDURE — 36569 INSJ PICC 5 YR+ W/O IMAGING: CPT

## 2022-08-25 PROCEDURE — 25000003 PHARM REV CODE 250: Performed by: INTERNAL MEDICINE

## 2022-08-25 RX ORDER — HEPARIN 100 UNIT/ML
500 SYRINGE INTRAVENOUS
Status: DISCONTINUED | OUTPATIENT
Start: 2022-08-25 | End: 2022-08-25 | Stop reason: HOSPADM

## 2022-08-25 RX ORDER — SODIUM CHLORIDE 0.9 % (FLUSH) 0.9 %
10 SYRINGE (ML) INJECTION
Status: DISCONTINUED | OUTPATIENT
Start: 2022-08-25 | End: 2022-08-25 | Stop reason: HOSPADM

## 2022-08-25 RX ADMIN — MAGNESIUM SULFATE HEPTAHYDRATE 250 ML/HR: 500 INJECTION, SOLUTION INTRAMUSCULAR; INTRAVENOUS at 10:08

## 2022-08-25 RX ADMIN — CISPLATIN 187 MG: 1 INJECTION INTRAVENOUS at 10:08

## 2022-08-25 RX ADMIN — APREPITANT 130 MG: 130 INJECTION, EMULSION INTRAVENOUS at 10:08

## 2022-08-25 RX ADMIN — DEXAMETHASONE SODIUM PHOSPHATE 0.25 MG: 4 INJECTION, SOLUTION INTRA-ARTICULAR; INTRALESIONAL; INTRAMUSCULAR; INTRAVENOUS; SOFT TISSUE at 10:08

## 2022-08-25 RX ADMIN — SODIUM CHLORIDE 1000 ML: 9 INJECTION, SOLUTION INTRAVENOUS at 09:08

## 2022-08-25 NOTE — PROGRESS NOTES
"Reason for Visit:  New Chemo Treatment: Squamous Cell Carcinoma of base of Tongue      PMHx: Tobacco Use, ETOH Use    Height: 5'10"  Usual BW: ~200 lb, last weighing in Jan 2022  Weight Change: ~35% wt loss x 7 months  IBW: 166 lb  BMI: 21    Allergies: Patient has no known allergies.    Current Medications:    Current Outpatient Medications:     amLODIPine (NORVASC) 2.5 MG tablet, Take 2.5 mg by mouth once daily., Disp: , Rfl:     ondansetron (ZOFRAN-ODT) 8 MG TbDL, Take 1 tablet (8 mg total) by mouth every 8 (eight) hours as needed (nausea)., Disp: 90 tablet, Rfl: 3    Labs:  8/23/22 -- H/H 11.4/34,WBC 9.3, Glu 125, K 4.1, BUN 14, Cr 0.87    Diet History: Pt beginning chemo this am; denies dysphagia currently; reports difficulty chewing d/t recent dental extractions x 2 weeks ago, most difficulty with meats, discussed soft protein sources to incorporate in diet such as PB, lentils, eggs, yogurt, milk & ONS; pt reports drinking ~ 5 Ensure daily, unsure if Original or Plus -- advised to use Ensure Plus to provide 350 kcal, 13 gm protein per serving to best meet nutrition needs; reports n/v yesterday morning, no vomiting this am or prior to onset of yesterday; Significant wt loss noted per EMR, pt's wife reports pt's UBW ~200 lb last weighing in Jan 2022    Nutrition Education:    Patient educated on: Eating Hints Before, During and After Cancer Treament.      Teaching Method:  Explanation and Printed Materials    Patient's Understanding: Verbalizes understanding    Barriers to learning: None evident    Expected Compliance:  good    All questions were answered. Dietitian's contact information provided.      Nutrition Diagnosis:   1.)Problem:  malnutrition  Etiology (related to): difficulty chewing and tumor location  Signs/Symptoms (as evidenced by): <75% nutrition intake > 1 month, >5% wt loss x 1 month     Malnutrition in the context of chronic illness  Degree of Malnutrition: severe malnutrition  Energy Intake: " < 75% of estimated energy requirement for >/= 1 month  Interpretation of Weight Loss: >5% in 1 month  Body Fat: unable to obtain  Area of Body Fat Loss: unable to obtain  Muscle Mass Loss: unable to obtain  Area of Muscle Mass Loss: unable to obtain  Fluid Accumulation: not applicable  Edema: not applicable  Reduced  Strength: unable to obtain  A minimum of two characteristics is recommended for diagnosis of either severe or non-severe malnutrition.    Nutrition Rx:  EEN: 1594-5427 kcal (30 - 32 kcal/kg_  EPN: 88 gm (1.3 gm/kg)  FLD:4450-8292 ml (1 ml/kcal)    Intervention:  1.  Soft, Easy to Chew diet  2.  Ensure Plus 5 daily to provide 1750 kcal (83% kcal), 65 gm protein (74% pro)  3.  Monitor Weights Weekly       Monitoring:  energy intake, weight and labs

## 2022-08-25 NOTE — NURSING
NN met with patient for follow up visit. Patient in clinic for chemotherapy. Reviewed NN contact information and NN role in patient's care.    Reviewed NCCN Distress Screen. Patient reported his level of distress at 0. Reports that they dropped off application for SSID.    NN reviewed educational material regarding community and cancer resources including nutritional supplement through [facility]. Patient says he has radiation today after chemo treatment. Patient denies any needs at this time and agrees to contact NN for any needs/concerns.

## 2022-08-26 ENCOUNTER — INFUSION (OUTPATIENT)
Dept: INFUSION THERAPY | Facility: HOSPITAL | Age: 57
End: 2022-08-26
Attending: INTERNAL MEDICINE
Payer: MEDICAID

## 2022-08-26 VITALS
TEMPERATURE: 97 F | HEART RATE: 75 BPM | DIASTOLIC BLOOD PRESSURE: 100 MMHG | SYSTOLIC BLOOD PRESSURE: 162 MMHG | RESPIRATION RATE: 18 BRPM

## 2022-08-26 DIAGNOSIS — C01 SQUAMOUS CELL CARCINOMA OF BASE OF TONGUE: Primary | ICD-10-CM

## 2022-08-26 PROCEDURE — 96360 HYDRATION IV INFUSION INIT: CPT

## 2022-08-26 PROCEDURE — 25000003 PHARM REV CODE 250: Performed by: INTERNAL MEDICINE

## 2022-08-26 RX ORDER — SODIUM CHLORIDE 0.9 % (FLUSH) 0.9 %
10 SYRINGE (ML) INJECTION
Status: DISCONTINUED | OUTPATIENT
Start: 2022-08-26 | End: 2022-08-26 | Stop reason: HOSPADM

## 2022-08-26 RX ORDER — HEPARIN 100 UNIT/ML
500 SYRINGE INTRAVENOUS
Status: CANCELLED | OUTPATIENT
Start: 2022-09-01

## 2022-08-26 RX ORDER — SODIUM CHLORIDE 0.9 % (FLUSH) 0.9 %
10 SYRINGE (ML) INJECTION
Status: CANCELLED | OUTPATIENT
Start: 2022-09-01

## 2022-08-26 RX ORDER — HEPARIN 100 UNIT/ML
500 SYRINGE INTRAVENOUS
Status: DISCONTINUED | OUTPATIENT
Start: 2022-08-26 | End: 2022-08-26 | Stop reason: HOSPADM

## 2022-08-26 RX ADMIN — SODIUM CHLORIDE 1000 ML: 9 INJECTION, SOLUTION INTRAVENOUS at 07:08

## 2022-08-30 DIAGNOSIS — C01 SQUAMOUS CELL CARCINOMA OF BASE OF TONGUE: ICD-10-CM

## 2022-08-30 DIAGNOSIS — Z72.0 TOBACCO ABUSE: Primary | ICD-10-CM

## 2022-09-01 ENCOUNTER — OFFICE VISIT (OUTPATIENT)
Dept: HEMATOLOGY/ONCOLOGY | Facility: CLINIC | Age: 57
End: 2022-09-01
Payer: MEDICAID

## 2022-09-01 ENCOUNTER — INFUSION (OUTPATIENT)
Dept: INFUSION THERAPY | Facility: HOSPITAL | Age: 57
End: 2022-09-01
Attending: INTERNAL MEDICINE
Payer: MEDICAID

## 2022-09-01 VITALS
BODY MASS INDEX: 20.09 KG/M2 | SYSTOLIC BLOOD PRESSURE: 106 MMHG | TEMPERATURE: 98 F | OXYGEN SATURATION: 99 % | DIASTOLIC BLOOD PRESSURE: 70 MMHG | WEIGHT: 143.5 LBS | HEART RATE: 82 BPM | HEIGHT: 71 IN | RESPIRATION RATE: 20 BRPM

## 2022-09-01 DIAGNOSIS — C01 SQUAMOUS CELL CARCINOMA OF BASE OF TONGUE: Primary | ICD-10-CM

## 2022-09-01 PROCEDURE — 3008F PR BODY MASS INDEX (BMI) DOCUMENTED: ICD-10-PCS | Mod: CPTII,,, | Performed by: NURSE PRACTITIONER

## 2022-09-01 PROCEDURE — 1160F RVW MEDS BY RX/DR IN RCRD: CPT | Mod: CPTII,,, | Performed by: NURSE PRACTITIONER

## 2022-09-01 PROCEDURE — 3078F PR MOST RECENT DIASTOLIC BLOOD PRESSURE < 80 MM HG: ICD-10-PCS | Mod: CPTII,,, | Performed by: NURSE PRACTITIONER

## 2022-09-01 PROCEDURE — 3008F BODY MASS INDEX DOCD: CPT | Mod: CPTII,,, | Performed by: NURSE PRACTITIONER

## 2022-09-01 PROCEDURE — 25000003 PHARM REV CODE 250: Performed by: NURSE PRACTITIONER

## 2022-09-01 PROCEDURE — 99214 OFFICE O/P EST MOD 30 MIN: CPT | Mod: S$PBB,,, | Performed by: NURSE PRACTITIONER

## 2022-09-01 PROCEDURE — 96360 HYDRATION IV INFUSION INIT: CPT

## 2022-09-01 PROCEDURE — 3074F PR MOST RECENT SYSTOLIC BLOOD PRESSURE < 130 MM HG: ICD-10-PCS | Mod: CPTII,,, | Performed by: NURSE PRACTITIONER

## 2022-09-01 PROCEDURE — 1160F PR REVIEW ALL MEDS BY PRESCRIBER/CLIN PHARMACIST DOCUMENTED: ICD-10-PCS | Mod: CPTII,,, | Performed by: NURSE PRACTITIONER

## 2022-09-01 PROCEDURE — 96523 IRRIG DRUG DELIVERY DEVICE: CPT | Mod: XB

## 2022-09-01 PROCEDURE — 1159F MED LIST DOCD IN RCRD: CPT | Mod: CPTII,,, | Performed by: NURSE PRACTITIONER

## 2022-09-01 PROCEDURE — 99213 OFFICE O/P EST LOW 20 MIN: CPT | Mod: PBBFAC,25 | Performed by: NURSE PRACTITIONER

## 2022-09-01 PROCEDURE — 3078F DIAST BP <80 MM HG: CPT | Mod: CPTII,,, | Performed by: NURSE PRACTITIONER

## 2022-09-01 PROCEDURE — 3074F SYST BP LT 130 MM HG: CPT | Mod: CPTII,,, | Performed by: NURSE PRACTITIONER

## 2022-09-01 PROCEDURE — 1159F PR MEDICATION LIST DOCUMENTED IN MEDICAL RECORD: ICD-10-PCS | Mod: CPTII,,, | Performed by: NURSE PRACTITIONER

## 2022-09-01 PROCEDURE — 99214 PR OFFICE/OUTPT VISIT, EST, LEVL IV, 30-39 MIN: ICD-10-PCS | Mod: S$PBB,,, | Performed by: NURSE PRACTITIONER

## 2022-09-01 RX ORDER — SODIUM CHLORIDE 0.9 % (FLUSH) 0.9 %
10 SYRINGE (ML) INJECTION
Status: DISCONTINUED | OUTPATIENT
Start: 2022-09-01 | End: 2022-09-01 | Stop reason: HOSPADM

## 2022-09-01 RX ORDER — SODIUM CHLORIDE 0.9 % (FLUSH) 0.9 %
10 SYRINGE (ML) INJECTION
Status: CANCELLED | OUTPATIENT
Start: 2022-09-01

## 2022-09-01 RX ORDER — HEPARIN 100 UNIT/ML
500 SYRINGE INTRAVENOUS
Status: CANCELLED | OUTPATIENT
Start: 2022-09-01

## 2022-09-01 RX ORDER — SODIUM CHLORIDE 0.9 % (FLUSH) 0.9 %
10 SYRINGE (ML) INJECTION
Status: CANCELLED | OUTPATIENT
Start: 2022-09-08

## 2022-09-01 RX ORDER — HEPARIN 100 UNIT/ML
500 SYRINGE INTRAVENOUS
Status: DISCONTINUED | OUTPATIENT
Start: 2022-09-01 | End: 2022-09-01 | Stop reason: HOSPADM

## 2022-09-01 RX ORDER — HEPARIN 100 UNIT/ML
500 SYRINGE INTRAVENOUS
Status: CANCELLED | OUTPATIENT
Start: 2022-09-08

## 2022-09-01 RX ADMIN — SODIUM CHLORIDE 1000 ML: 9 INJECTION, SOLUTION INTRAVENOUS at 12:09

## 2022-09-01 NOTE — PROGRESS NOTES
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 Intermittent alcohol use.  Procedure/surgical history: Back surgery (20-30 years ago, in use 10, apparently involving L5 fusion for prolapse this from work-related injury).  Social history:  Has been smoking a pack of cigarettes daily since age 17 years.  Has been drinking 6 beers daily for last 40 years.  Lately, trying to quit smoking and drinking.  Smokes marijuana.  .  Has 2 biological children of his own.  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  Lives in [place], [place]..  Family history:  Father  from some kind of cancer at age 72 years.  Health maintenance:  Does not have a regular primary care provider.  Does not visit with doctors.  No screening colonoscopy ever.     History of Present illness:  57-year-old gentleman     He was admitted to [facility] 2022 with 2 months history of progressive dysphagia, odynophagia, soreness of tongue, 20 lb weight loss and bilateral neck masses.  Also, hoarseness.  CT neck showed ulcerated mass in the tongue base, malignant-appearing, along with lymph node metastasis.  ENT performed a laryngoscopy with biopsy of tongue mass.  Friable base of tongue mass.  No airway compromise.  Right upper lung lobe indistinct, ground-glass lung nodules, typical of a benign inflammatory etiology.  Discharged 2022.  Patient reported weight loss of> 20 lb in previous 2 months.    Interval History 2022: Patient presents along with his wife for scheduled follow up for and toxicity check. He received first dose of Cisplatin one week ago and says post treatment he had N/V, weakness, fatigue. He says those symptoms are now gone. He denies  any neuropathy symptoms, diarrhea, constipation, fever, abdominal pain, new or worsening masses, new lymphadenopathy. Lab work reviewed with patient, stable. Currently patient only consume liquids but plan to start implementing soft foods into diet every day. Discussed follow up appointments with patient. No further questions or concerns voiced.       Lab Results   Component Value Date    WBC 12.7 (H) 09/01/2022    HGB 11.7 (L) 09/01/2022    HCT 35.7 (L) 09/01/2022    MCV 91.3 09/01/2022     09/01/2022       CMP  Sodium Level   Date Value Ref Range Status   09/01/2022 134 (L) 136 - 145 mmol/L Final     Potassium Level   Date Value Ref Range Status   09/01/2022 4.1 3.5 - 5.1 mmol/L Final     Carbon Dioxide   Date Value Ref Range Status   09/01/2022 29 22 - 29 mmol/L Final     Blood Urea Nitrogen   Date Value Ref Range Status   09/01/2022 23.7 8.4 - 25.7 mg/dL Final     Creatinine   Date Value Ref Range Status   09/01/2022 0.98 0.73 - 1.18 mg/dL Final     Calcium Level Total   Date Value Ref Range Status   09/01/2022 9.0 8.4 - 10.2 mg/dL Final     Albumin Level   Date Value Ref Range Status   09/01/2022 3.2 (L) 3.5 - 5.0 gm/dL Final     Bilirubin Total   Date Value Ref Range Status   09/01/2022 0.3 <=1.5 mg/dL Final     Alkaline Phosphatase   Date Value Ref Range Status   09/01/2022 70 40 - 150 unit/L Final     Aspartate Aminotransferase   Date Value Ref Range Status   09/01/2022 13 5 - 34 unit/L Final     Alanine Aminotransferase   Date Value Ref Range Status   09/01/2022 13 0 - 55 unit/L Final     Estimated GFR-Non    Date Value Ref Range Status   07/18/2022 >60 mls/min/1.73/m2 Final       Physical Exam    Vitals & Measurements  Vitals:    09/01/22 1112   BP: 106/70   Pulse: 82   Resp: 20   Temp: 98.4 °F (36.9 °C)       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, p16 +:  -presentation: 07/2022:  Dysphagia, odynophagia, 20 lb weight loss, bilateral neck masses, hoarseness  -CT  soft tissues of the neck without contrast 07/05/2022, 07/29/2022  -S/P panendoscopy 07/06/2022  -ulcerated tongue base mass, at least 5 cm  -ulcerated friable mass base of the tongue, confined base of tongue on panendoscopy  -right level 2 lymph node 3.2 x 2.9 cm on CT  -left level 3 lymph node 5.1 x 4.2 cm on CT  -invasive squamous cell carcinoma, grade 3 of 4; p16 +  -no intrathoracic metastasis on CT chest  >>cT3 cN2 M0, clinical stage II        # history of tobacco abuse since age 17     # history of alcohol use    Plan:   -ENT agreed with concurrent chemoradiation therapy  -dental evaluation, nutrition/speech/swallow evaluation  We will treat with high-dose cisplatin concurrent with RT  -Cisplatin 100 mg/m² IV days 1, 22, and 23, concurrent with RT  -tentatively speaking, will re-stage with PET-CT 3 months post completion of chemoradiation therapy    PET-CT-scheduled for 9/14/2022     -Hydration + Picc line dressing change today  -Infusion (hydration, picc line dressing change + lab work only on 9/8/2022   -Follow up with   in 2 weeks on 9/15/2022 with lab work prior to Cisplatin #2  and to review PET/CT results  -We will administer chemotherapy via PICC line  -Close monitoring of toxicity including hematologic, renal, electrolyte imbalance, ototoxicity, neuropathy, etc.  -Check CBC, CMP, and magnesium level weekly     -For follow-up after chemoradiation therapy, see below  -if no metastasis, then, treatment options:  1. Concurrent chemoradiation therapy; or  2. Resection of primary and ipsilateral or bilateral neck dissection; or  3. Induction chemotherapy (category 3 recommendation) followed by radiotherapy or chemoradiation therapy   >>>    -Close follow-up of clinical toxicity, counts, chemistry profile, and electrolytes; CBC, CMP, and magnesium level weekly.     -smoking cessation counseling:  Smoking and alcohol cessation discussed in detail.     Throat pain:  He has been taking Norco 7.5 mg  every 6 hours p.r.n., with satisfactory pain relief; managed by Radiation   Have sent a consultation request to Internal Medicine for him to get established with a PCP-pending     Above discussed with the patient and his wife.  All questions answered.  He understands and agrees with this plan.  -------------------     Discussion:  Chemotherapy:   High-dose cisplatin: Days 1, 22, and 43: Cisplatin 100 mg/m² IV + concurrent RT     Discussed potential side effects of chemotherapy and chemoradiation therapy including nausea, vomiting, nutritional compromise, kidney toxicity, electrolyte imbalance, ototoxicity, peripheral neuropathy, cytopenias, infections, need for blood transfusion, infections, sepsis, oral mucositis resulting in pain and nutritional compromise, dehydration, etc.    Post CRT neck evaluation:   4-8 weeks clinical assessment as appropriate:   1.  If residual primary, persistent disease, or progression: Then assess extent of disease or distant metastasis with CT or MRI with contrast or FDG PET/CT; followed by resection   2.  If response: Then, assess extent of disease or distant metastasis with FDG PET/CT at minimum 12 weeks weeks (preferred), or CT of primary and neck and/or MRI with contrast at 8-12 weeks

## 2022-09-01 NOTE — Clinical Note
Infusion today for fluids and picc line dressing change Infusion (picc line dressing change, hydration)+ lab work on 9/8/2022  Follow up with  in 2 weeks on 9/15/2022 with lab work + infusion for Cisplatin #2

## 2022-09-07 NOTE — TELEPHONE ENCOUNTER
Norco 7.5 mg p.o. q.6 hours p.r.n..    Send a consultation to Internal Medicine for him to get established with a PCP.   No

## 2022-09-07 NOTE — OP NOTE
Otolaryngology Operative Note    Date of procedure: 09/07/2022    Attending Surgeon:   LALITO Kearns MD    Resident Surgeon: Adelina Klein PGY III    Pre-operative diagnosis:  1. Squamous cell carcinoma of base of tongue      Post-operative diagnosis:   1. Same    Procedure:  1. Dental extractions    Anesthesia: General    Complications: None    Specimens:   * No specimens in log *    Blood loss: < 10 mL    Indication:  Yaron Brennan is a 57 y.o. male admitted 8/5/2022. Newly diagnosed base of tongue squamous cell carcinoma. After evaluation by Dr. Chu, dental extractions recommended. After risks, benefits and alternatives were discussed patient was scheduled for this procedure on 9/7/2022 at [facility].     Procedure in detail:  The patient was brought to the operating theater and placed supine on the operating table.  General endotracheal anesthesia was induced.  The mouth was prepped and draped in the usual sterile fashion.  Timeout was performed.      The teeth were extracted in the normal fashion. Care was taken to ensure all roots were removed. The gingiva was closed with 3-0 chromic suture.    The patient tolerated the procedure well and without complications.  The patient's care was delivered into the hands of the anesthesia team thereafter.  All counts were correct at the end of the procedure.    Dr. Chu was present and scrubbed for the entire procedure. Dr. Grossman was present and available for the entire procedure.    9/7/2022  1:51 PM    Adelina Klein MD  [facility] Otolaryngology, PGY III

## 2022-09-08 ENCOUNTER — INFUSION (OUTPATIENT)
Dept: INFUSION THERAPY | Facility: HOSPITAL | Age: 57
End: 2022-09-08
Attending: INTERNAL MEDICINE
Payer: MEDICAID

## 2022-09-08 VITALS
RESPIRATION RATE: 18 BRPM | SYSTOLIC BLOOD PRESSURE: 101 MMHG | WEIGHT: 143.94 LBS | BODY MASS INDEX: 20.15 KG/M2 | HEART RATE: 67 BPM | DIASTOLIC BLOOD PRESSURE: 65 MMHG | HEIGHT: 71 IN

## 2022-09-08 DIAGNOSIS — C01 SQUAMOUS CELL CARCINOMA OF BASE OF TONGUE: Primary | ICD-10-CM

## 2022-09-08 PROCEDURE — A4216 STERILE WATER/SALINE, 10 ML: HCPCS | Performed by: INTERNAL MEDICINE

## 2022-09-08 PROCEDURE — 25000003 PHARM REV CODE 250: Performed by: INTERNAL MEDICINE

## 2022-09-08 PROCEDURE — 25000003 PHARM REV CODE 250: Performed by: NURSE PRACTITIONER

## 2022-09-08 RX ORDER — HEPARIN 100 UNIT/ML
500 SYRINGE INTRAVENOUS
Status: CANCELLED | OUTPATIENT
Start: 2022-09-08

## 2022-09-08 RX ORDER — HEPARIN 100 UNIT/ML
500 SYRINGE INTRAVENOUS
Status: DISCONTINUED | OUTPATIENT
Start: 2022-09-08 | End: 2022-09-08 | Stop reason: HOSPADM

## 2022-09-08 RX ORDER — SODIUM CHLORIDE 0.9 % (FLUSH) 0.9 %
10 SYRINGE (ML) INJECTION
Status: COMPLETED | OUTPATIENT
Start: 2022-09-08 | End: 2022-09-08

## 2022-09-08 RX ORDER — SODIUM CHLORIDE 0.9 % (FLUSH) 0.9 %
10 SYRINGE (ML) INJECTION
Status: CANCELLED | OUTPATIENT
Start: 2022-09-08

## 2022-09-08 RX ORDER — HEPARIN 100 UNIT/ML
500 SYRINGE INTRAVENOUS
Status: CANCELLED | OUTPATIENT
Start: 2022-09-15

## 2022-09-08 RX ORDER — SODIUM CHLORIDE 0.9 % (FLUSH) 0.9 %
10 SYRINGE (ML) INJECTION
Status: CANCELLED | OUTPATIENT
Start: 2022-09-15

## 2022-09-08 RX ORDER — SODIUM CHLORIDE 0.9 % (FLUSH) 0.9 %
10 SYRINGE (ML) INJECTION
Status: DISCONTINUED | OUTPATIENT
Start: 2022-09-08 | End: 2022-09-08 | Stop reason: HOSPADM

## 2022-09-08 RX ADMIN — SODIUM CHLORIDE 1000 ML: 9 INJECTION, SOLUTION INTRAVENOUS at 12:09

## 2022-09-08 RX ADMIN — SODIUM CHLORIDE, PRESERVATIVE FREE 10 ML: 5 INJECTION INTRAVENOUS at 12:09

## 2022-09-13 ENCOUNTER — CLINICAL SUPPORT (OUTPATIENT)
Dept: AUDIOLOGY | Facility: HOSPITAL | Age: 57
End: 2022-09-13
Payer: MEDICAID

## 2022-09-13 DIAGNOSIS — C01 SQUAMOUS CELL CARCINOMA OF BASE OF TONGUE: ICD-10-CM

## 2022-09-13 NOTE — PROGRESS NOTES
Mr. Brennan is in for a baseline audio for the purpose of ototoxic monitoring. He reports infusion treatments started about 3 weeks ago and is scheduled for his second treatment this week. Audiological evaluation could not be performed today due to significantly occluded EAC's, bilaterally.  Upon removal attempt, it was determined that cerumen was extremely hardened therefore cerumenolytics is recommended to be done at least one week prior to removal attempt.  Mr. Brennan is being referred to ENT for removal.  Audiological evaluation recommended per PCP request as audiological results won't be valid for ototoxic monitoring given the treatments have already started and ongoing until appropriate testing can be done.

## 2022-09-14 ENCOUNTER — HOSPITAL ENCOUNTER (OUTPATIENT)
Dept: RADIOLOGY | Facility: HOSPITAL | Age: 57
Discharge: HOME | End: 2022-09-14
Attending: INTERNAL MEDICINE
Payer: MEDICAID

## 2022-09-14 DIAGNOSIS — C77.0 SECONDARY MALIGNANT NEOPLASM OF CERVICAL LYMPH NODE: ICD-10-CM

## 2022-09-14 DIAGNOSIS — C01 SQUAMOUS CELL CARCINOMA OF BASE OF TONGUE: ICD-10-CM

## 2022-09-14 PROCEDURE — 78815 PET IMAGE W/CT SKULL-THIGH: CPT | Mod: TC

## 2022-09-15 ENCOUNTER — HOSPITAL ENCOUNTER (OUTPATIENT)
Dept: RADIOLOGY | Facility: HOSPITAL | Age: 57
Discharge: HOME | End: 2022-09-15
Payer: MEDICAID

## 2022-09-15 ENCOUNTER — OFFICE VISIT (OUTPATIENT)
Dept: SURGERY | Facility: CLINIC | Age: 57
End: 2022-09-15
Payer: MEDICAID

## 2022-09-15 ENCOUNTER — OFFICE VISIT (OUTPATIENT)
Dept: HEMATOLOGY/ONCOLOGY | Facility: CLINIC | Age: 57
End: 2022-09-15
Payer: MEDICAID

## 2022-09-15 ENCOUNTER — INFUSION (OUTPATIENT)
Dept: INFUSION THERAPY | Facility: HOSPITAL | Age: 57
End: 2022-09-15
Attending: INTERNAL MEDICINE
Payer: MEDICAID

## 2022-09-15 VITALS
BODY MASS INDEX: 20.4 KG/M2 | TEMPERATURE: 99 F | OXYGEN SATURATION: 97 % | WEIGHT: 145.75 LBS | RESPIRATION RATE: 20 BRPM | SYSTOLIC BLOOD PRESSURE: 107 MMHG | HEART RATE: 70 BPM | HEIGHT: 71 IN | DIASTOLIC BLOOD PRESSURE: 72 MMHG

## 2022-09-15 VITALS
DIASTOLIC BLOOD PRESSURE: 86 MMHG | BODY MASS INDEX: 20.44 KG/M2 | OXYGEN SATURATION: 100 % | RESPIRATION RATE: 18 BRPM | HEIGHT: 71 IN | TEMPERATURE: 98 F | SYSTOLIC BLOOD PRESSURE: 118 MMHG | WEIGHT: 146 LBS | HEART RATE: 68 BPM

## 2022-09-15 VITALS
DIASTOLIC BLOOD PRESSURE: 79 MMHG | RESPIRATION RATE: 18 BRPM | HEART RATE: 68 BPM | SYSTOLIC BLOOD PRESSURE: 116 MMHG | OXYGEN SATURATION: 97 % | TEMPERATURE: 98 F

## 2022-09-15 DIAGNOSIS — M79.89 PAIN AND SWELLING OF UPPER EXTREMITY, RIGHT: ICD-10-CM

## 2022-09-15 DIAGNOSIS — Z51.11 ENCOUNTER FOR CHEMOTHERAPY MANAGEMENT: ICD-10-CM

## 2022-09-15 DIAGNOSIS — I82.621 ACUTE DEEP VEIN THROMBOSIS (DVT) OF OTHER VEIN OF RIGHT UPPER EXTREMITY: ICD-10-CM

## 2022-09-15 DIAGNOSIS — C77.0 SECONDARY MALIGNANT NEOPLASM OF CERVICAL LYMPH NODE: ICD-10-CM

## 2022-09-15 DIAGNOSIS — M79.601 PAIN AND SWELLING OF UPPER EXTREMITY, RIGHT: ICD-10-CM

## 2022-09-15 DIAGNOSIS — C01 SQUAMOUS CELL CARCINOMA OF BASE OF TONGUE: Primary | ICD-10-CM

## 2022-09-15 PROCEDURE — 3078F DIAST BP <80 MM HG: CPT | Mod: CPTII,,,

## 2022-09-15 PROCEDURE — 3008F BODY MASS INDEX DOCD: CPT | Mod: CPTII,,,

## 2022-09-15 PROCEDURE — 99215 PR OFFICE/OUTPT VISIT, EST, LEVL V, 40-54 MIN: ICD-10-PCS | Mod: S$PBB,,,

## 2022-09-15 PROCEDURE — 99215 OFFICE O/P EST HI 40 MIN: CPT | Mod: S$PBB,,,

## 2022-09-15 PROCEDURE — 3078F PR MOST RECENT DIASTOLIC BLOOD PRESSURE < 80 MM HG: ICD-10-PCS | Mod: CPTII,,,

## 2022-09-15 PROCEDURE — 99214 OFFICE O/P EST MOD 30 MIN: CPT | Mod: PBBFAC,25

## 2022-09-15 PROCEDURE — 3008F PR BODY MASS INDEX (BMI) DOCUMENTED: ICD-10-PCS | Mod: CPTII,,,

## 2022-09-15 PROCEDURE — 99215 OFFICE O/P EST HI 40 MIN: CPT | Mod: PBBFAC,25,27

## 2022-09-15 PROCEDURE — 3074F PR MOST RECENT SYSTOLIC BLOOD PRESSURE < 130 MM HG: ICD-10-PCS | Mod: CPTII,,,

## 2022-09-15 PROCEDURE — 93971 EXTREMITY STUDY: CPT | Mod: RT

## 2022-09-15 PROCEDURE — 3074F SYST BP LT 130 MM HG: CPT | Mod: CPTII,,,

## 2022-09-15 RX ORDER — HEPARIN 100 UNIT/ML
500 SYRINGE INTRAVENOUS
Status: CANCELLED | OUTPATIENT
Start: 2022-09-15

## 2022-09-15 RX ORDER — SODIUM CHLORIDE 0.9 % (FLUSH) 0.9 %
10 SYRINGE (ML) INJECTION
Status: CANCELLED | OUTPATIENT
Start: 2022-09-20

## 2022-09-15 RX ORDER — HEPARIN 100 UNIT/ML
500 SYRINGE INTRAVENOUS
Status: CANCELLED | OUTPATIENT
Start: 2022-09-20

## 2022-09-15 RX ORDER — SODIUM CHLORIDE 0.9 % (FLUSH) 0.9 %
10 SYRINGE (ML) INJECTION
Status: CANCELLED | OUTPATIENT
Start: 2022-09-15

## 2022-09-15 NOTE — Clinical Note
Labs, md visit for pet scan review in 3 weeks, schedule day 43 of cisplatin for 10/6 and hydration 10/7

## 2022-09-15 NOTE — PROGRESS NOTES
[facility] General Surgery Clinic Note  09/15/2022     CC: Mediport evaluation  HPI: 57 y.o. M presented to [facility] 07/2022 with 2 months history of progressive dysphagia, hoarseness, odynophagia, soreness of tongue, 20 lb weight loss and bilateral neck masses. 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 Pt was receiving Cisplatin through a right-sided PICC line which was removed today due to a large DVT extending to his subclavian vein on the same side. He was referred by oncology for assessment for Mediport placement today. He denies f/c/v/SOB/CP, he stopped smoking cigarettes 07/2022 but smokes marijuana weekly. Endorses nausea today and wants to leave to take his medicine. He has not seen a cardiologist recently but reports no cardiovascular disease other than HTN.     PMH:   Past Medical History:   Diagnosis Date    Cancer     Hypertension       Meds:   Current Outpatient Medications:     amLODIPine (NORVASC) 2.5 MG tablet, Take 2.5 mg by mouth once daily., Disp: , Rfl:     gabapentin (NEURONTIN) 100 MG capsule, Take 1 capsule (100 mg total) by mouth 3 (three) times daily., Disp: 90 capsule, Rfl: 11    ondansetron (ZOFRAN-ODT) 8 MG TbDL, Take 1 tablet (8 mg total) by mouth every 8 (eight) hours as needed (nausea)., Disp: 90 tablet, Rfl: 3    HYDROcodone-acetaminophen (NORCO) 5-325 mg per tablet, Take 1 tablet by mouth every 6 (six) hours as needed for Pain. (Patient not taking: Reported on 9/15/2022), Disp: 15 tablet, Rfl: 0    Current Facility-Administered Medications:     sodium chloride 0.9% flush 10 mL, 10 mL, Intravenous, PRN, Anna Taylor MD    Facility-Administered Medications Ordered in Other Visits:     lactated ringers infusion, , Intravenous, Continuous, Elzbieta Quesada MD, Last Rate: 10 mL/hr at 08/05/22 0808, New Bag at 08/05/22 0808    LIDOcaine (PF) 10 mg/ml (1%) injection 10 mg, 1 mL, Intradermal, Once, SANDY Levy  Allergies: Review of patient's allergies  indicates:  No Known Allergies  Social History:   Social History     Tobacco Use    Smoking status: Former     Packs/day: 1.50     Years: 35.00     Pack years: 52.50     Types: Cigarettes     Start date:      Quit date: 2022     Years since quittin.1    Smokeless tobacco: Never    Tobacco comments:     Been almost 35 yr   Substance Use Topics    Alcohol use: Not Currently    Drug use: Yes     Frequency: 3.0 times per week     Types: Marijuana     Comment: Daily     Family History:   Family History   Problem Relation Age of Onset    Hypertension Mother     Cancer Father     Hypertension Sister     Hypertension Brother      Surgical History:   Past Surgical History:   Procedure Laterality Date    BACK SURGERY      DIRECT LARYNGOSCOPY N/A 2022    Procedure: LARYNGOSCOPY, DIRECT;  Surgeon: Mekhi Grossman MD;  Location: [facility] OR;  Service: ENT;  Laterality: N/A;    EXTRACTION OF TOOTH N/A 2022    Procedure: EXTRACTION, TEETH;  Surgeon: Mkehi Grossman MD;  Location: [facility] OR;  Service: ENT;  Laterality: N/A;    SPINE SURGERY      Not sure of date maybe in      Review of Systems:  Skin: No rashes or itching.  Head: Denies headache or recent trauma.  Eyes: Denies eye pain or double vision.  Neck: Denies swelling or hoarseness of voice.  Respiratory: Denies shortness of breath or chest pain  Cardiac: Denies palpitations or swelling in hands/feet.  Gastrointestinal: Denies nausea, denies vomiting. Positive symptoms for scc of tongue base diagnosis.  Urinary: Denies dysuria or hematuria.  Vascular: Denies claudication or leg swelling.  Neuro: Denies motor deficits. Denies weakness.  Endocrine: Denies excessive sweating or cold intolerance.  Psych: Denies memory problems. Denies anxiety.    Objective:    Vitals:  Vitals:    09/15/22 1325   BP: 118/86   Pulse: 68   Resp: 18   Temp: 98.3 °F (36.8 °C)      Physical Exam:  Gen: NAD. Large, overt neck mass on left side  Neuro: awake, alert,  answering questions appropriately  CV: RRR  Resp: non-labored breathing, VARUN  Abd: soft, ND, NT  Ext: moves all 4 spontaneously and purposefully  Skin: warm, well perfused  Mouth: Positive symptoms for scc of tongue base diagnosis.    Assessment/Plan:  57 y.o. 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    - PICC line removed due to US confirmed DVT of right arm extending into subclavian  - Unable to place mediport at this time  - RTC 3 weeks    Alden Pablo, MS3    Above note edited as needed with the following addendum:  Oncology nurse notified - surgery unable to place mediport at this time. Obvious neck mass on the left, DVT in right arm extending to right subclavian. Patient prescribed Xarelto today for DVT - told not to take it in order for Oncology group to set up PICC placement in left arm as it will be safer in the context of acute DVT than a mediport placement would be since the left subclavian is our only access option. Patient will require further evaluation for mediport placement after some time on Xarelto - likely left subclavian placement. RTC 3 weeks    Jay Restrepo MD  [facility] General Surgery PGY-1

## 2022-09-15 NOTE — PATIENT INSTRUCTIONS
Pt seen by Lauro ATKINSON. Pt was informed that mediport could not be placed due to DVT on right side. Dr. Bernal did talk to nurse in oncology to recommend left side picc line. Pt informed of plan by both staff providers and voiced understanding. Pt was scheduled for 3 week follow up in surgery clinic.JUN

## 2022-09-15 NOTE — PROGRESS NOTES
Problem List:  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, p16 +    Current Treatment:  -Cisplatin 100 mg/m² IV days 1, 22, and 43, concurrent with RT  Start 2022    Treatment History:  N/A    Past medical history:  Tobacco abuse since age 17. Intermittent alcohol use.  Procedure/surgical history: Back surgery (20-30 years ago, in use 10, apparently involving L5 fusion for prolapse this from work-related injury).  Social history:  Has been smoking a pack of cigarettes daily since age 17 years.  Has been drinking 6 beers daily for last 40 years.  Lately, trying to quit smoking and drinking.  Smokes marijuana.  .  Has 2 biological children of his own.  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  Lives in [place], [place]..  Family history:  Father  from some kind of cancer at age 72 years.  Health maintenance:  Does not have a regular primary care provider.  Does not visit with doctors.  No screening colonoscopy ever.     History of Present illness:  57-year-old gentleman     He was admitted to [facility] 2022 with 2 months history of progressive dysphagia, odynophagia, soreness of tongue, 20 lb weight loss and bilateral neck masses.  Also, hoarseness.  CT neck showed ulcerated mass in the tongue base, malignant-appearing, along with lymph node metastasis.  ENT performed a laryngoscopy with biopsy of tongue mass.  Friable base of tongue mass.  No airway compromise.  Right upper lung lobe indistinct, ground-glass lung nodules, typical of a benign inflammatory etiology.  Discharged 2022.  Patient reported weight loss of> 20 lb in previous 2 months.    Interval History 9/15/2022   Patient here today for follow up and Day 22 of Cisplatin.  Accompanied by his wife.  Reportedly first treatment went ok.  Reports nausea relieved by Zofran.  He also reports appetite changes and supplementing with Ensure and tolerating  ok.  He denies CP, SOB, inability to swallow, diarrhea, or constipation.  Reports neuropathy changes before starting chemotherapy but it has not worsened. Advised importance to report worsening neuropathy symptoms as it has the ability to cause permanent damage. He complains of pain and swelling to right upper arm and elbow.  Currently has PICC line inserted for chemotherapy. Able to aspirate and flush without difficulty. Old bloody drainage noted to exit site that he states started after last weekly dressing change. Right arm swelling noted, no redness, or warmth. Advised will send to have right arm US to rule out DVT.  He is amenable.  Labs reviewed and clinically stable for treatment after ruling out concerns with right arm.  Denies any other concerns or issues at this time.            ROS:  Constitutional: No fever, chills, weight loss, weakness or fatigue  Eye: No visual disturbances or changes in vision, no double vision  ENMT: no decreased hearing, nasal congestion, nosebleeds, sore throat, taste disturbance, tinnitus, vertigo  Respiratory: No shortness of breath, cough, sputum production, hemoptysis or pleuritic type chest pain  Cardiovascular: No chest pain, palpitations, syncope, leg swelling  Gastrointestinal: No nausea, vomiting, diarrhea, constipation, heartburn, abdominal pain  Genitourinary: No CVA tenderness  Hematology/lymph: no abnormal bruising, hemorrhage, petechiae swollen lymph glands  Musculoskeletal: No back or neck pain, joint pain, muscle pain or decreased range of motion. Right arm pain  Integumentary: No rash, pruritus, skin lesion or any other significant skin complaints  Neurologic: alert and oriented x4, no abnormal balance, confusion, numbness, tingling, headache  Psychiatric: No anxiety, depression, suicidal or homicidal ideations  12 point ROS done in full with pertinent positives as described in interval history. Remainder of ROS are negative.       Physical Exam    Vitals &  Measurements  Vitals:    09/15/22 0806   BP: 107/72   Pulse: 70   Resp: 20   Temp: 98.8 °F (37.1 °C)         GENERAL:  In no apparent distress.    HEAD:  No signs of head trauma.  EYES:  Pupils are equal.  Extraocular motions intact.    EARS:  Hearing grossly intact.  MOUTH:  Oropharynx is normal.   NECK:  No adenopathy, no JVD.  Right and left masses unchanged  CHEST:  Chest with clear breath sounds bilaterally.  No wheezes, rales, or rhonchi.    CARDIAC:  Regular rate and rhythm.  S1 and S2, without murmurs, gallops, or rubs.  VASCULAR:  No Edema.  Peripheral pulses normal and equal in all extremities.  ABDOMEN:  Soft, without detectable tenderness.  No sign of distention.  No   rebound or guarding, and no masses palpated.   Bowel Sounds normal.  MUSCULOSKELETAL:  Good range of motion of all major joints. Extremities without clubbing, cyanosis or edema. Right arm swelling    NEUROLOGIC EXAM:  Alert and oriented x 3.  No focal sensory or strength deficits.   Speech normal.  Follows commands.  PSYCHIATRIC:  Mood normal.  SKIN:  No rash or lesions.       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, p16 +:  -presentation: 07/2022:  Dysphagia, odynophagia, 20 lb weight loss, bilateral neck masses, hoarseness  -CT soft tissues of the neck without contrast 07/05/2022, 07/29/2022  -S/P panendoscopy 07/06/2022  -ulcerated tongue base mass, at least 5 cm  -ulcerated friable mass base of the tongue, confined base of tongue on panendoscopy  -right level 2 lymph node 3.2 x 2.9 cm on CT  -left level 3 lymph node 5.1 x 4.2 cm on CT  -invasive squamous cell carcinoma, grade 3 of 4; p16 +  -no intrathoracic metastasis on CT chest  >>cT3 cN2 M0, clinical stage II        # history of tobacco abuse since age 17     # history of alcohol use    Plan:   -ENT agreed with concurrent chemoradiation therapy  -dental evaluation, nutrition/speech/swallow evaluation  We will treat with high-dose cisplatin concurrent with  RT  -Cisplatin 100 mg/m² IV days 1, 22, and 43, concurrent with RT  -tentatively speaking, will re-stage with PET-CT 3 months post completion of chemoradiation therapy        - Remove right upper arm PICC; secondary to extensive DVT ; Case discussed with general surgery recommendations for PICC insertion to left upper extremity; Will start anticoagulation on Xarelto  - Hold Cisplatin today secondary to PICC insertion DVT; Plan for treatment resumption within 2 weeks  - Labs, follow up with  to review PET/CT results in 2 weeks  - Refer to vascular; secondary to Right upper arm DVT       -For follow-up after chemoradiation therapy, see below  -if no metastasis, then, treatment options:  1. Concurrent chemoradiation therapy; or  2. Resection of primary and ipsilateral or bilateral neck dissection; or  3. Induction chemotherapy (category 3 recommendation) followed by radiotherapy or chemoradiation therapy   >>>    -Close follow-up of clinical toxicity, counts, chemistry profile, and electrolytes; CBC, CMP, and magnesium level weekly.     -smoking cessation counseling:  Smoking and alcohol cessation discussed in detail.     Throat pain:  He has been taking Norco 7.5 mg every 6 hours p.r.n., with satisfactory pain relief; managed by Radiation   Have sent a consultation request to Internal Medicine for him to get established with a PCP-pending     Above discussed with the patient and his wife.  All questions answered.  He understands and agrees with this plan.  -------------------     Discussion:  Chemotherapy:   High-dose cisplatin: Days 1, 22, and 43: Cisplatin 100 mg/m² IV + concurrent RT     Discussed potential side effects of chemotherapy and chemoradiation therapy including nausea, vomiting, nutritional compromise, kidney toxicity, electrolyte imbalance, ototoxicity, peripheral neuropathy, cytopenias, infections, need for blood transfusion, infections, sepsis, oral mucositis resulting in pain and  nutritional compromise, dehydration, etc.    Post CRT neck evaluation:   4-8 weeks clinical assessment as appropriate:   1.  If residual primary, persistent disease, or progression: Then assess extent of disease or distant metastasis with CT or MRI with contrast or FDG PET/CT; followed by resection   2.  If response: Then, assess extent of disease or distant metastasis with FDG PET/CT at minimum 12 weeks weeks (preferred), or CT of primary and neck and/or MRI with contrast at 8-12 weeks

## 2022-09-16 DIAGNOSIS — C01 SQUAMOUS CELL CARCINOMA OF BASE OF TONGUE: Primary | ICD-10-CM

## 2022-09-18 PROBLEM — I82.A11 ACUTE DEEP VEIN THROMBOSIS (DVT) OF AXILLARY VEIN OF RIGHT UPPER EXTREMITY: Status: ACTIVE | Noted: 2022-09-18

## 2022-09-18 PROBLEM — I82.B11 RIGHT SUBCLAVIAN VEIN THROMBOSIS: Status: ACTIVE | Noted: 2022-09-18

## 2022-09-18 RX ORDER — HEPARIN 100 UNIT/ML
500 SYRINGE INTRAVENOUS
Status: CANCELLED | OUTPATIENT
Start: 2022-10-10

## 2022-09-18 RX ORDER — HEPARIN 100 UNIT/ML
500 SYRINGE INTRAVENOUS
Status: CANCELLED | OUTPATIENT
Start: 2022-10-11

## 2022-09-18 RX ORDER — SODIUM CHLORIDE 0.9 % (FLUSH) 0.9 %
10 SYRINGE (ML) INJECTION
Status: CANCELLED | OUTPATIENT
Start: 2022-10-11

## 2022-09-18 RX ORDER — SODIUM CHLORIDE 0.9 % (FLUSH) 0.9 %
10 SYRINGE (ML) INJECTION
Status: CANCELLED | OUTPATIENT
Start: 2022-10-10

## 2022-09-19 ENCOUNTER — DOCUMENTATION ONLY (OUTPATIENT)
Dept: HEMATOLOGY/ONCOLOGY | Facility: CLINIC | Age: 57
End: 2022-09-19
Payer: MEDICAID

## 2022-09-19 ENCOUNTER — INFUSION (OUTPATIENT)
Dept: INFUSION THERAPY | Facility: HOSPITAL | Age: 57
End: 2022-09-19
Attending: INTERNAL MEDICINE
Payer: MEDICAID

## 2022-09-19 ENCOUNTER — HOSPITAL ENCOUNTER (OUTPATIENT)
Dept: RADIOLOGY | Facility: HOSPITAL | Age: 57
Discharge: HOME | End: 2022-09-19
Attending: INTERNAL MEDICINE
Payer: MEDICAID

## 2022-09-19 VITALS
WEIGHT: 145.06 LBS | HEART RATE: 67 BPM | RESPIRATION RATE: 18 BRPM | OXYGEN SATURATION: 100 % | TEMPERATURE: 98 F | HEIGHT: 71 IN | DIASTOLIC BLOOD PRESSURE: 72 MMHG | SYSTOLIC BLOOD PRESSURE: 105 MMHG | BODY MASS INDEX: 20.31 KG/M2

## 2022-09-19 DIAGNOSIS — C01 SQUAMOUS CELL CARCINOMA OF BASE OF TONGUE: Primary | ICD-10-CM

## 2022-09-19 PROCEDURE — 96368 THER/DIAG CONCURRENT INF: CPT | Mod: 59

## 2022-09-19 PROCEDURE — 96413 CHEMO IV INFUSION 1 HR: CPT

## 2022-09-19 PROCEDURE — 36569 INSJ PICC 5 YR+ W/O IMAGING: CPT

## 2022-09-19 PROCEDURE — 63600175 PHARM REV CODE 636 W HCPCS: Mod: JG

## 2022-09-19 PROCEDURE — 63600175 PHARM REV CODE 636 W HCPCS: Performed by: INTERNAL MEDICINE

## 2022-09-19 PROCEDURE — 96367 TX/PROPH/DG ADDL SEQ IV INF: CPT

## 2022-09-19 PROCEDURE — 27201423 HC KIT VASCULAR ACCESS

## 2022-09-19 PROCEDURE — A4216 STERILE WATER/SALINE, 10 ML: HCPCS

## 2022-09-19 PROCEDURE — 96360 HYDRATION IV INFUSION INIT: CPT | Mod: 59

## 2022-09-19 PROCEDURE — 25000003 PHARM REV CODE 250

## 2022-09-19 PROCEDURE — 96375 TX/PRO/DX INJ NEW DRUG ADDON: CPT

## 2022-09-19 PROCEDURE — 25000003 PHARM REV CODE 250: Performed by: INTERNAL MEDICINE

## 2022-09-19 RX ORDER — SODIUM CHLORIDE 0.9 % (FLUSH) 0.9 %
10 SYRINGE (ML) INJECTION
Status: DISCONTINUED | OUTPATIENT
Start: 2022-09-19 | End: 2022-09-19 | Stop reason: HOSPADM

## 2022-09-19 RX ORDER — HEPARIN 100 UNIT/ML
500 SYRINGE INTRAVENOUS
Status: DISCONTINUED | OUTPATIENT
Start: 2022-09-19 | End: 2022-09-19 | Stop reason: HOSPADM

## 2022-09-19 RX ADMIN — SODIUM CHLORIDE 1000 ML: 9 INJECTION, SOLUTION INTRAVENOUS at 12:09

## 2022-09-19 RX ADMIN — SODIUM CHLORIDE, PRESERVATIVE FREE 10 ML: 5 INJECTION INTRAVENOUS at 12:09

## 2022-09-19 RX ADMIN — CISPLATIN 187 MG: 1 INJECTION INTRAVENOUS at 01:09

## 2022-09-19 RX ADMIN — DEXAMETHASONE SODIUM PHOSPHATE 0.25 MG: 4 INJECTION, SOLUTION INTRA-ARTICULAR; INTRALESIONAL; INTRAMUSCULAR; INTRAVENOUS; SOFT TISSUE at 12:09

## 2022-09-19 RX ADMIN — POTASSIUM CHLORIDE 250 ML/HR: 2 INJECTION, SOLUTION, CONCENTRATE INTRAVENOUS at 01:09

## 2022-09-19 RX ADMIN — APREPITANT 130 MG: 130 INJECTION, EMULSION INTRAVENOUS at 12:09

## 2022-09-19 NOTE — PROGRESS NOTES
Patient reports unable to fill Xarelto on 9/16/22 due to pharmacy needing prior authorization.  Authorization completed and Xarelto start pack called into [facility] in [place] [phone]. Prescription approved and patient able to  today and start treatment.  Patient made aware.

## 2022-09-19 NOTE — PROGRESS NOTES
Past medical history:  Tobacco abuse since age 17. Intermittent alcohol use.  Procedure/surgical history: Back surgery (20-30 years ago, in use 10, apparently involving L5 fusion for prolapse this from work-related injury).  Social history:  Has been smoking a pack of cigarettes daily since age 17 years.  Has been drinking 6 beers daily for last 40 years.  Lately, trying to quit smoking and drinking.  Smokes marijuana.  .  Has 2 biological children of his own.  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  Lives in [place], [place]..  Family history:  Father  from some kind of cancer at age 72 years.  Health maintenance:  Does not have a regular primary care provider.  Does not visit with doctors.  No screening colonoscopy ever.      History of present illness:  57-year-old gentleman    He was admitted to [facility] 2022 with 2 months history of progressive dysphagia, odynophagia, soreness of tongue, 20 lb weight loss and bilateral neck masses.  Also, hoarseness.  CT neck showed ulcerated mass in the tongue base, malignant-appearing, along with lymph node metastasis.  ENT performed a laryngoscopy with biopsy of tongue mass.  Friable base of tongue mass.  No airway compromise.  Right upper lung lobe indistinct, ground-glass lung nodules, typical of a benign inflammatory etiology.  Discharged 2022.  Patient reported weight loss of> 20 lb in previous 2 months.    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, p16 +:  -presentation: 2022:  Dysphagia, odynophagia, 20 lb weight loss, bilateral neck masses, hoarseness  -CT soft tissues of the neck without contrast 2022, 2022  -S/P panendoscopy 2022  -ulcerated tongue base mass, at least 5 cm  -ulcerated friable mass base of the tongue, confined base of tongue on panendoscopy  -right level 2 lymph node 3.2 x 2.9 cm on CT  -left level  3 lymph node 5.1 x 4.2 cm on CT  -invasive squamous cell carcinoma, grade 3 of 4; p16 +  -no intrathoracic metastasis on CT chest  >>cT3 cN2 M0, clinical stage II  -08/25/2022:  High-dose cisplatin, concurrent with radiotherapy, started  -09/14/2022: Staging PET-CT (comparison:  CT soft tissues of the neck 07/29/2022):  1. Tongue base midline central necrotic ABD hypermetabolic mass, consistent with squamous cell carcinoma (4.2 x 2.4 cm, maximum SUV 10.02  2. Bilateral neck hypermetabolic enlarged lymph nodes are consistent with metastases (left neck level 3 lymph node, centrally necrotic sabrina complex, 4.6 x 4.4 cm, maximum SUV 5.27); left neck level 2 hypermetabolic enlarged lymph node, 2.2 x 1.8 cm, maximum SUV 7.05) (right neck level 2 central necrotic peripherally hypermetabolic enlarged lymph node, 3.0 x 2.4 cm, maximum SUV 4.27)  -09/15/2022:  CV ultrasound Doppler venous right upper extremity (pain and swelling right upper extremity):  + for DVT; extensive thrombus seen in right subclavian, axilla, and basilic veins; also, thrombus in proximal cephalic vein (started on Xarelto)  (The DVT was related to PICC line; it was removed by surgery on 09/15/2022; MediPort was unable to be placed)      07/18/2022:  Presents for initial medical oncology consultation, accompanied by his wife.  Pleasant gentleman.  In no acute discomfort.  40 lb weight loss in last 3 months.  Appetite is preserved.  Dysphagia.  No odynophagia.  Pain left side of throat overall the lymphadenopathy as well as during swallowing.  No hemoptysis, nausea, vomiting.  No dyspnea. Left-sided otalgia.  ECOG 0.   Chronic tobacco and alcohol abuse (see above).  Bilateral cervical lymphadenopathy started 3 months ago; slowly progressive.  -no unusual headaches, loss of consciousness, seizures, stroke-like symptoms, fevers, or chills.  No abdominal pain, nausea, vomiting.      Interval history:    09/20/2022:  -08/25/2022:  High-dose cisplatin,  concurrent with radiotherapy, started  -09/14/2022: Staging PET-CT (comparison:  CT soft tissues of the neck 07/29/2022):  1. Tongue base midline central necrotic ABD hypermetabolic mass, consistent with squamous cell carcinoma (4.2 x 2.4 cm, maximum SUV 10.02  2. Bilateral neck hypermetabolic enlarged lymph nodes are consistent with metastases (left neck level 3 lymph node, centrally necrotic sabrina complex, 4.6 x 4.4 cm, maximum SUV 5.27); left neck level 2 hypermetabolic enlarged lymph node, 2.2 x 1.8 cm, maximum SUV 7.05) (right neck level 2 central necrotic peripherally hypermetabolic enlarged lymph node, 3.0 x 2.4 cm, maximum SUV 4.27)  -09/15/2022:  CV ultrasound Doppler venous right upper extremity (pain and swelling right upper extremity):  + for DVT; extensive thrombus seen in right subclavian, axilla, and basilic veins; also, thrombus in proximal cephalic vein (started on Xarelto)  (The DVT was related to PICC line; PICC line was removed by surgery on 09/15/2022; MediPort was unable to be placed)  Presents for a follow-up visit.  Accompanied by his wife.  Overall, doing well, without significant side effects from chemoradiation therapy.  No significant mucositis or throat pain.  Concentrating on liquid diet including ensure, jello, broad, etc..  No dysphagia or odynophagia to liquids.  Good appetite.  ECOG 1.  Denies weakness or hemoptysis.  No stridor or dyspnea.  Some heartburn.  Mild numbness and tingling of fingertips but only when receiving intravenous fluids.  No significant toxicity.  Unfortunately, has not started Xarelto as yet; unable to procure it from pharmacy.  Hopefully, we will get Xarelto from pharmacy today.  Denies swelling of right upper extremity.  Denies chest pain or dyspnea.  Instructed him to go to hospital emergency room immediately if he starts having chest pain or dyspnea.      Review of systems:  All systems reviewed, and found to be negative except for the symptoms detailed  above.      Physical examination:  VITAL SIGNS:  Reviewed.      GENERAL:  In no apparent distress.    HEAD:  No signs of head trauma.  EYES:  Pupils are equal.  Extraocular motions intact.    EARS:  Hearing grossly intact.  MOUTH:  Oropharynx is normal.   NECK:  No adenopathy, no JVD.     CHEST:  Chest with clear breath sounds bilaterally.  No wheezes, rales, or rhonchi.    CARDIAC:  Regular rate and rhythm.  S1 and S2, without murmurs, gallops, or rubs.  VASCULAR:  No Edema.  Peripheral pulses normal and equal in all extremities.  ABDOMEN:  Soft, without detectable tenderness.  No sign of distention.  No   rebound or guarding, and no masses palpated.   Bowel Sounds normal.  MUSCULOSKELETAL:  Good range of motion of all major joints. Extremities without clubbing, cyanosis or edema.    NEUROLOGIC EXAM:  Alert and oriented x 3.  No focal sensory or strength deficits.   Speech normal.  Follows commands.  PSYCHIATRIC:  Mood normal.  SKIN:  No rash or lesions.  07/18/2022:  In no acute discomfort.  Bulky bilateral cervical lymphadenopathy is apparent.  Left cervical lymphadenopathy measures at least 7 cm x 6 cm, slightly tender.  Right cervical lymphadenopathy measures about 4 cm x 3 cm.  Lymphadenopathy is mobile; left lymphadenopathy less so.  No superficial ulceration.  08/16/2022: Bilateral cervical lymphadenopathy.      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, p16 +:  -presentation: 07/2022:  Dysphagia, odynophagia, 20 lb weight loss, bilateral neck masses, hoarseness  -CT soft tissues of the neck without contrast 07/05/2022, 07/29/2022  -S/P panendoscopy 07/06/2022  -ulcerated tongue base mass, at least 5 cm  -ulcerated friable mass base of the tongue, confined base of tongue on panendoscopy  -right level 2 lymph node 3.2 x 2.9 cm on CT, 3.0 x 2.4 cm on PET-CT  -left level 3 lymph node 5.1 x 4.2 cm on CT, 4.6 x 4.4 cm on PET-CT  -left neck level 2 lymph node, 2.2 x 1.8 cm on PET-CT  -invasive  squamous cell carcinoma, grade 3 of 4; p16 +  -no intrathoracic metastasis on CT chest  >>cT3 cN2 M0, clinical stage II  -plan: Chemoradiation therapy  -high-dose cisplatin started 08/25/2022, concurrent with RT      # PICC line related DVT right subclavian, axillary, and basilic veins (09/15/2022):   -presentation:  Pain and swelling right upper extremity  -anticoagulation with Xarelto started 09/15/2022    # history of tobacco abuse since age 17  # history of alcohol use      Plan:  -ENT agreed with plan of concurrent chemoradiation therapy  -Plan: High-dose cisplatin concurrent with RT  -Cisplatin 100 mg/m² IV days 1, 22, and 23, concurrent with RT  -high-dose cisplatin started 08/25/2022, concurrent with RT; cycle 2 of cisplatin 09/19/2022  -continue to monitor for toxicity  -check CBC, CMP, and magnesium level every 7-10 days    -tentatively speaking, will re-stage with PET-CT 3 months post completion of chemoradiation therapy    -developed extensive DVT right upper extremity, including subclavian vein, axillary vein, and basilic vein 09/15/2022, secondary to PICC line  -right-sided PICC line removed; MediPort was unable to place; chemotherapy is being continued with PICC line on the left side   -anticoagulation with Xarelto started 09/15/2022  -anticoagulation to continue for at least 3 months (until the middle/end of December ' 22)  (He hopes to pickup Xarelto from pharmacy today)    -For follow-up after chemoradiation therapy, see below    -smoking cessation counseling:  Smoking and alcohol cessation discussed in detail.    Have sent a consultation request to Internal Medicine for him to get established with a PCP.    Follow-up with NP in 2 weeks, with CBC, CMP, and magnesium level.    Above discussed with him.  All questions answered.    Discussed labs and scans and gave him copies of relevant records.  He and his wife understand and agree with this  plan.  -------------------      Discussion:  Chemotherapy:   High-dose cisplatin: Days 1, 22, and 43: Cisplatin 100 mg/m² IV + concurrent RT    Post CRT neck evaluation:   4-8 weeks clinical assessment as appropriate:   1.  If residual primary, persistent disease, or progression: Then assess extent of disease or distant metastasis with CT or MRI with contrast or FDG PET/CT; followed by resection   2.  If response: Then, assess extent of disease or distant metastasis with FDG PET/CT at minimum 12 weeks weeks (preferred), or CT of primary and neck and/or MRI with contrast at 8-12 weeks

## 2022-09-20 ENCOUNTER — OFFICE VISIT (OUTPATIENT)
Dept: HEMATOLOGY/ONCOLOGY | Facility: CLINIC | Age: 57
End: 2022-09-20
Payer: MEDICAID

## 2022-09-20 ENCOUNTER — INFUSION (OUTPATIENT)
Dept: INFUSION THERAPY | Facility: HOSPITAL | Age: 57
End: 2022-09-20
Attending: INTERNAL MEDICINE
Payer: MEDICAID

## 2022-09-20 VITALS
DIASTOLIC BLOOD PRESSURE: 84 MMHG | SYSTOLIC BLOOD PRESSURE: 165 MMHG | OXYGEN SATURATION: 97 % | HEIGHT: 71 IN | HEART RATE: 70 BPM | WEIGHT: 149.94 LBS | TEMPERATURE: 98 F | BODY MASS INDEX: 20.99 KG/M2 | RESPIRATION RATE: 20 BRPM

## 2022-09-20 DIAGNOSIS — C01 SQUAMOUS CELL CARCINOMA OF BASE OF TONGUE: Primary | ICD-10-CM

## 2022-09-20 DIAGNOSIS — Z78.9 ALCOHOL USE: ICD-10-CM

## 2022-09-20 DIAGNOSIS — Z72.0 TOBACCO ABUSE: ICD-10-CM

## 2022-09-20 DIAGNOSIS — C77.0 SECONDARY MALIGNANT NEOPLASM OF CERVICAL LYMPH NODE: ICD-10-CM

## 2022-09-20 DIAGNOSIS — I82.A11 ACUTE DEEP VEIN THROMBOSIS (DVT) OF AXILLARY VEIN OF RIGHT UPPER EXTREMITY: ICD-10-CM

## 2022-09-20 DIAGNOSIS — I82.B11 RIGHT SUBCLAVIAN VEIN THROMBOSIS: ICD-10-CM

## 2022-09-20 PROCEDURE — 96360 HYDRATION IV INFUSION INIT: CPT

## 2022-09-20 PROCEDURE — 99214 OFFICE O/P EST MOD 30 MIN: CPT | Mod: PBBFAC,25 | Performed by: INTERNAL MEDICINE

## 2022-09-20 PROCEDURE — 1160F RVW MEDS BY RX/DR IN RCRD: CPT | Mod: CPTII,,, | Performed by: INTERNAL MEDICINE

## 2022-09-20 PROCEDURE — 99214 OFFICE O/P EST MOD 30 MIN: CPT | Mod: S$PBB,,, | Performed by: INTERNAL MEDICINE

## 2022-09-20 PROCEDURE — 25000003 PHARM REV CODE 250

## 2022-09-20 PROCEDURE — 3008F BODY MASS INDEX DOCD: CPT | Mod: CPTII,,, | Performed by: INTERNAL MEDICINE

## 2022-09-20 PROCEDURE — 3077F PR MOST RECENT SYSTOLIC BLOOD PRESSURE >= 140 MM HG: ICD-10-PCS | Mod: CPTII,,, | Performed by: INTERNAL MEDICINE

## 2022-09-20 PROCEDURE — 99214 PR OFFICE/OUTPT VISIT, EST, LEVL IV, 30-39 MIN: ICD-10-PCS | Mod: S$PBB,,, | Performed by: INTERNAL MEDICINE

## 2022-09-20 PROCEDURE — 1159F PR MEDICATION LIST DOCUMENTED IN MEDICAL RECORD: ICD-10-PCS | Mod: CPTII,,, | Performed by: INTERNAL MEDICINE

## 2022-09-20 PROCEDURE — 3079F PR MOST RECENT DIASTOLIC BLOOD PRESSURE 80-89 MM HG: ICD-10-PCS | Mod: CPTII,,, | Performed by: INTERNAL MEDICINE

## 2022-09-20 PROCEDURE — A4216 STERILE WATER/SALINE, 10 ML: HCPCS

## 2022-09-20 PROCEDURE — 1159F MED LIST DOCD IN RCRD: CPT | Mod: CPTII,,, | Performed by: INTERNAL MEDICINE

## 2022-09-20 PROCEDURE — 3077F SYST BP >= 140 MM HG: CPT | Mod: CPTII,,, | Performed by: INTERNAL MEDICINE

## 2022-09-20 PROCEDURE — 1160F PR REVIEW ALL MEDS BY PRESCRIBER/CLIN PHARMACIST DOCUMENTED: ICD-10-PCS | Mod: CPTII,,, | Performed by: INTERNAL MEDICINE

## 2022-09-20 PROCEDURE — 3008F PR BODY MASS INDEX (BMI) DOCUMENTED: ICD-10-PCS | Mod: CPTII,,, | Performed by: INTERNAL MEDICINE

## 2022-09-20 PROCEDURE — 3079F DIAST BP 80-89 MM HG: CPT | Mod: CPTII,,, | Performed by: INTERNAL MEDICINE

## 2022-09-20 RX ORDER — HEPARIN 100 UNIT/ML
500 SYRINGE INTRAVENOUS
Status: DISCONTINUED | OUTPATIENT
Start: 2022-09-20 | End: 2022-09-20 | Stop reason: HOSPADM

## 2022-09-20 RX ORDER — SODIUM CHLORIDE 0.9 % (FLUSH) 0.9 %
10 SYRINGE (ML) INJECTION
Status: DISCONTINUED | OUTPATIENT
Start: 2022-09-20 | End: 2022-09-20 | Stop reason: HOSPADM

## 2022-09-20 RX ADMIN — SODIUM CHLORIDE 1000 ML: 9 INJECTION, SOLUTION INTRAVENOUS at 11:09

## 2022-09-20 RX ADMIN — SODIUM CHLORIDE, PRESERVATIVE FREE 10 ML: 5 INJECTION INTRAVENOUS at 12:09

## 2022-09-20 NOTE — NURSING
Pt saw Dr. Welsh today for results.  Pt here today for 1 L NS.  Pt accomp by wife.  Pt denies any pain.  Pt does report hiccups and states he usually has it for a day after chemo.  Pt also reports some nausea.

## 2022-09-22 ENCOUNTER — OFFICE VISIT (OUTPATIENT)
Dept: OTOLARYNGOLOGY | Facility: CLINIC | Age: 57
End: 2022-09-22
Payer: MEDICAID

## 2022-09-22 VITALS
TEMPERATURE: 99 F | BODY MASS INDEX: 21.05 KG/M2 | WEIGHT: 147 LBS | SYSTOLIC BLOOD PRESSURE: 106 MMHG | RESPIRATION RATE: 16 BRPM | HEIGHT: 70 IN | HEART RATE: 69 BPM | DIASTOLIC BLOOD PRESSURE: 68 MMHG

## 2022-09-22 DIAGNOSIS — C01 SQUAMOUS CELL CARCINOMA OF BASE OF TONGUE: Primary | ICD-10-CM

## 2022-09-22 PROCEDURE — 99213 OFFICE O/P EST LOW 20 MIN: CPT | Mod: PBBFAC | Performed by: OTOLARYNGOLOGY

## 2022-09-22 NOTE — PROGRESS NOTES
[facility]  Otolaryngology Clinic Note    Yaron Brennan  Encounter Date: 9/22/2022  YOB: 1965  Physician: Anna Taylor    Chief Complaint: Otalgia, neck mass    HPI: Yaron Brennan is a 57 y.o. male with HTN who presents as referral from Dr. Welsh for squamous cell carcinoma of base of tongue. Patient reports that toward the beginning of the year he started to have worsening left otalgia. He then noticed a bump/swelling on left neck that he thought was was due to a bug bite. This got progressively bigger over time and he then noticed some swelling on right neck as well. He eventually presented to [facility] in early July 2022. He had a CT neck that showed an ulcerated mass in the tongue base, and underwent direct laryngoscopy with ENT on 7/6/22 (Dr. Tay). Operative findings: ulcerated friable mass base of the tongue that seems confined to base of tongue; lingual surface of epiglottis is free from tumor; mass involves bilateral base of tongue, significantly midline. Results returned positive for p16+ SCCa. Today in clinic patient reports bilateral L>R otalgia, mild dysphagia/odynophagia. He reports he eats basically whatever he wants without overt signs of aspiration. He does report 40lb weight loss in the last 6 months. + Neck LAD. Reports voice changes. Denies any issues breathing. Smoked 1ppd since 17 years old (40 years) but quit a month ago when he got his diagnosis. Also previously drank 12 pack of beer a day and likewise quite a month ago. Referred to ENT for consideration of possible surgical resection.     07/05/2022: CT soft tissue of the neck without contrast:  -ulcerated tongue base mass, at least 3.4 cm by 3.0 cm x 3.1 cm; associated bulky level 2 and level 3 cervical lymphadenopathy; suspected level 1 and level 4 cervical lymphadenopathy; a right level 2 lymph node with central necrosis 2.6 x 3.3 cm; a left level 3 lymph node 4.5 x 3.2 cm; no  significant airway compromise; no acute or aggressive bony lesions  -malignant ulcerated type mass with lymph node metastasis; no airway compromise; right upper lung lobe indistinct ground-glass pulmonary nodules typical of benign infectious or inflammatory etiology    8/16/22: Here today for follow up. Had dental extractions performed on 8/5. Not having any issues. Taking in 4-5 ensures daily. Trying to take some other soft foods like grits and pudding.     9/22/22: Here for follow up. Patient is undergoing CRT and tolerating it well. He reports he has one more chemotherapy session left and about 2-3 weeks of radiation left. Overall maintaining his weight. No progressive dysphagia, swallowing fine. Also no breathing issues or other complaints.    ROS:   General: Negative except per HPI  Skin: Denies rash, ulcer, or lesion.  Eyes: Denies vision changes or diplopia.  Ears: Negative except per HPI  Nose: Negative except per HPI  Throat/mouth: Negative except per HPI  Cardiovascular: Negative except per HPI  Respiratory: Negative except per HPI  Neck: Negative except per HPI  Endocrine: Negative except per HPI  Neurologic: Negative except per HPI    Other 10-point review of systems negative except per HPI      Review of patient's allergies indicates:  No Known Allergies    Past Medical History:   Diagnosis Date    Cancer     Hypertension        Past Surgical History:   Procedure Laterality Date    BACK SURGERY      DIRECT LARYNGOSCOPY N/A 08/05/2022    Procedure: LARYNGOSCOPY, DIRECT;  Surgeon: Mekhi Grossman MD;  Location: [facility];  Service: ENT;  Laterality: N/A;    EXTRACTION OF TOOTH N/A 08/05/2022    Procedure: EXTRACTION, TEETH;  Surgeon: Mekhi Grossman MD;  Location: [facility];  Service: ENT;  Laterality: N/A;    SPINE SURGERY  2000    Not sure of date maybe in 2000       Social History     Socioeconomic History    Marital status:    Tobacco Use    Smoking status: Former     Packs/day: 1.50     Years:  35.00     Pack years: 52.50     Types: Cigarettes     Start date:      Quit date: 2022     Years since quittin.2    Smokeless tobacco: Never    Tobacco comments:     Been almost 35 yr   Substance and Sexual Activity    Alcohol use: Not Currently    Drug use: Yes     Frequency: 3.0 times per week     Types: Marijuana     Comment: Daily    Sexual activity: Yes     Partners: Female     Birth control/protection: None       Family History   Problem Relation Age of Onset    Hypertension Mother     Cancer Father     Hypertension Sister     Hypertension Brother        Outpatient Encounter Medications as of 2022   Medication Sig Dispense Refill    amLODIPine (NORVASC) 2.5 MG tablet Take 2.5 mg by mouth once daily.      gabapentin (NEURONTIN) 100 MG capsule Take 1 capsule (100 mg total) by mouth 3 (three) times daily. 90 capsule 11    HYDROcodone-acetaminophen (NORCO) 5-325 mg per tablet Take 1 tablet by mouth every 6 (six) hours as needed for Pain. (Patient not taking: Reported on 9/15/2022) 15 tablet 0    ondansetron (ZOFRAN-ODT) 8 MG TbDL Take 1 tablet (8 mg total) by mouth every 8 (eight) hours as needed (nausea). 90 tablet 3    rivaroxaban (XARELTO) 15 mg Tab Take 1 tablet twice a day with evening meal for 21 days 42 tablet 0    rivaroxaban (XARELTO) 20 mg Tab Start taking 1 tablet once daily by mouth with evening meal 30 tablet 1     Facility-Administered Encounter Medications as of 2022   Medication Dose Route Frequency Provider Last Rate Last Admin    lactated ringers infusion   Intravenous Continuous Elzbieta Quesada MD 10 mL/hr at 22 0808 New Bag at 22 0808    LIDOcaine (PF) 10 mg/ml (1%) injection 10 mg  1 mL Intradermal Once SANDY Levy        sodium chloride 0.9% flush 10 mL  10 mL Intravenous PRN Anna Taylor MD           Physical Exam:  Vitals:    22 1002   BP: 106/68   BP Location: Right arm   Patient Position: Sitting   BP Method: Medium (Automatic)  "  Pulse: 69   Resp: 16   Temp: 98.7 °F (37.1 °C)   TempSrc: Oral   Weight: 66.7 kg (147 lb)   Height: 5' 10" (1.778 m)       Constitutional  General Appearance: well nourished, well-developed, AAO x3, NAD; voice muffled/hot potato voice  HEENT  Eyes: PEERLA, EOMI, normal conjunctivae  Ears: Hearing well at conversation level;    AD: auricle normal,cerumen impaction   AS: auricle normal, cerumen impaction   Vestibular: ambulates without gait disturbance  Nose: septum midline, no inferior turbinate hypertrophy, no polyps, moist mucosa without erythema or blue hue  OC/OP: dentition poor, no oral lesions, tongue/FOM/BOT- soft, no leukoplakia/ulcerations/ tenderness, good extension  Nasopharynx, Hypopharynx, and Larynx:    Indirect: attempted, limited view due to patient intolerance  Neck: Left level III/IV 5-6cm lymph node, non tender, firm; right level II 3cm lymph node  Neuro: CN II - XII intact bilaterally  Cardiovascular: peripheral pulses palpable  Respiratory: non-labored respirations  Psychiatric: oriented to time, place and person, no depression, anxiety or agitation    Pertinent Data:  ? LABS:  ? AUDIO:  ? CT Scan:    Imaging:   I personally reviewed the following images:    Summary of Outside Records:      Assessment/Plan:  Yaron Brennan is a 57 y.o. 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 Undergoing CRT, started 8/25/22.    - Doing very well with treatment, has about 3 weeks left to go. No progressive dysphagia, no difficulty breathing, maintaining weight, voice less muffled.   - Not an airway concern at this time, but did discuss that should there be deterioration of his swallowing or airway status as a result of swelling or bleeding that we would potentially need temporary placement of a tracheostomy and/or PEG tube. Patient is agreeable to this if needed.  - We will plan to bring patient back to clinic in 6 " weeks. He will have completed CRT at that point and can begin regular monthly surveillance with scope exams at this time. Encouraged patient to call sooner if he is having any acute issues for which we can get him in sooner.

## 2022-09-26 ENCOUNTER — INFUSION (OUTPATIENT)
Dept: INFUSION THERAPY | Facility: HOSPITAL | Age: 57
End: 2022-09-26
Attending: INTERNAL MEDICINE
Payer: MEDICAID

## 2022-09-26 VITALS
SYSTOLIC BLOOD PRESSURE: 109 MMHG | WEIGHT: 141.31 LBS | BODY MASS INDEX: 20.28 KG/M2 | RESPIRATION RATE: 18 BRPM | TEMPERATURE: 99 F | HEART RATE: 70 BPM | DIASTOLIC BLOOD PRESSURE: 71 MMHG

## 2022-09-26 DIAGNOSIS — C01 SQUAMOUS CELL CARCINOMA OF BASE OF TONGUE: Primary | ICD-10-CM

## 2022-09-26 PROCEDURE — 96360 HYDRATION IV INFUSION INIT: CPT

## 2022-09-26 PROCEDURE — 25000003 PHARM REV CODE 250: Performed by: NURSE PRACTITIONER

## 2022-09-26 RX ORDER — SODIUM CHLORIDE 0.9 % (FLUSH) 0.9 %
10 SYRINGE (ML) INJECTION
Status: DISCONTINUED | OUTPATIENT
Start: 2022-09-26 | End: 2022-09-26 | Stop reason: HOSPADM

## 2022-09-26 RX ORDER — SODIUM CHLORIDE 0.9 % (FLUSH) 0.9 %
10 SYRINGE (ML) INJECTION
Status: CANCELLED | OUTPATIENT
Start: 2022-09-29

## 2022-09-26 RX ORDER — HEPARIN 100 UNIT/ML
500 SYRINGE INTRAVENOUS
Status: DISCONTINUED | OUTPATIENT
Start: 2022-09-26 | End: 2022-09-26 | Stop reason: HOSPADM

## 2022-09-26 RX ORDER — HEPARIN 100 UNIT/ML
500 SYRINGE INTRAVENOUS
Status: CANCELLED | OUTPATIENT
Start: 2022-09-29

## 2022-09-26 RX ADMIN — SODIUM CHLORIDE 1000 ML: 9 INJECTION, SOLUTION INTRAVENOUS at 11:09

## 2022-10-04 ENCOUNTER — INFUSION (OUTPATIENT)
Dept: INFUSION THERAPY | Facility: HOSPITAL | Age: 57
End: 2022-10-04
Attending: INTERNAL MEDICINE
Payer: MEDICAID

## 2022-10-04 ENCOUNTER — OFFICE VISIT (OUTPATIENT)
Dept: HEMATOLOGY/ONCOLOGY | Facility: CLINIC | Age: 57
End: 2022-10-04
Payer: MEDICAID

## 2022-10-04 ENCOUNTER — DOCUMENTATION ONLY (OUTPATIENT)
Dept: HEMATOLOGY/ONCOLOGY | Facility: CLINIC | Age: 57
End: 2022-10-04
Payer: MEDICAID

## 2022-10-04 VITALS
HEART RATE: 61 BPM | TEMPERATURE: 98 F | SYSTOLIC BLOOD PRESSURE: 103 MMHG | WEIGHT: 143 LBS | HEIGHT: 71 IN | OXYGEN SATURATION: 99 % | RESPIRATION RATE: 20 BRPM | BODY MASS INDEX: 20.02 KG/M2 | DIASTOLIC BLOOD PRESSURE: 66 MMHG

## 2022-10-04 DIAGNOSIS — C01 SQUAMOUS CELL CARCINOMA OF BASE OF TONGUE: Primary | ICD-10-CM

## 2022-10-04 DIAGNOSIS — E87.5 HYPERKALEMIA: ICD-10-CM

## 2022-10-04 PROCEDURE — 3074F SYST BP LT 130 MM HG: CPT | Mod: CPTII,,, | Performed by: NURSE PRACTITIONER

## 2022-10-04 PROCEDURE — 3078F PR MOST RECENT DIASTOLIC BLOOD PRESSURE < 80 MM HG: ICD-10-PCS | Mod: CPTII,,, | Performed by: NURSE PRACTITIONER

## 2022-10-04 PROCEDURE — 3008F PR BODY MASS INDEX (BMI) DOCUMENTED: ICD-10-PCS | Mod: CPTII,,, | Performed by: NURSE PRACTITIONER

## 2022-10-04 PROCEDURE — 1160F RVW MEDS BY RX/DR IN RCRD: CPT | Mod: CPTII,,, | Performed by: NURSE PRACTITIONER

## 2022-10-04 PROCEDURE — 25000003 PHARM REV CODE 250: Performed by: INTERNAL MEDICINE

## 2022-10-04 PROCEDURE — 3074F PR MOST RECENT SYSTOLIC BLOOD PRESSURE < 130 MM HG: ICD-10-PCS | Mod: CPTII,,, | Performed by: NURSE PRACTITIONER

## 2022-10-04 PROCEDURE — 99214 OFFICE O/P EST MOD 30 MIN: CPT | Mod: PBBFAC,25 | Performed by: NURSE PRACTITIONER

## 2022-10-04 PROCEDURE — 25000003 PHARM REV CODE 250: Performed by: NURSE PRACTITIONER

## 2022-10-04 PROCEDURE — 1159F PR MEDICATION LIST DOCUMENTED IN MEDICAL RECORD: ICD-10-PCS | Mod: CPTII,,, | Performed by: NURSE PRACTITIONER

## 2022-10-04 PROCEDURE — 3008F BODY MASS INDEX DOCD: CPT | Mod: CPTII,,, | Performed by: NURSE PRACTITIONER

## 2022-10-04 PROCEDURE — 99214 OFFICE O/P EST MOD 30 MIN: CPT | Mod: S$PBB,,, | Performed by: NURSE PRACTITIONER

## 2022-10-04 PROCEDURE — 1160F PR REVIEW ALL MEDS BY PRESCRIBER/CLIN PHARMACIST DOCUMENTED: ICD-10-PCS | Mod: CPTII,,, | Performed by: NURSE PRACTITIONER

## 2022-10-04 PROCEDURE — 1159F MED LIST DOCD IN RCRD: CPT | Mod: CPTII,,, | Performed by: NURSE PRACTITIONER

## 2022-10-04 PROCEDURE — 96523 IRRIG DRUG DELIVERY DEVICE: CPT

## 2022-10-04 PROCEDURE — 99214 PR OFFICE/OUTPT VISIT, EST, LEVL IV, 30-39 MIN: ICD-10-PCS | Mod: S$PBB,,, | Performed by: NURSE PRACTITIONER

## 2022-10-04 PROCEDURE — A4216 STERILE WATER/SALINE, 10 ML: HCPCS | Performed by: INTERNAL MEDICINE

## 2022-10-04 PROCEDURE — 96360 HYDRATION IV INFUSION INIT: CPT

## 2022-10-04 PROCEDURE — 3078F DIAST BP <80 MM HG: CPT | Mod: CPTII,,, | Performed by: NURSE PRACTITIONER

## 2022-10-04 RX ORDER — HEPARIN 100 UNIT/ML
500 SYRINGE INTRAVENOUS
Status: DISCONTINUED | OUTPATIENT
Start: 2022-10-04 | End: 2022-10-04 | Stop reason: HOSPADM

## 2022-10-04 RX ORDER — SODIUM CHLORIDE 0.9 % (FLUSH) 0.9 %
10 SYRINGE (ML) INJECTION
Status: CANCELLED | OUTPATIENT
Start: 2022-10-06

## 2022-10-04 RX ORDER — SODIUM CHLORIDE 0.9 % (FLUSH) 0.9 %
10 SYRINGE (ML) INJECTION
Status: DISCONTINUED | OUTPATIENT
Start: 2022-10-04 | End: 2022-10-04 | Stop reason: HOSPADM

## 2022-10-04 RX ORDER — HEPARIN 100 UNIT/ML
500 SYRINGE INTRAVENOUS
Status: CANCELLED | OUTPATIENT
Start: 2022-10-06

## 2022-10-04 RX ORDER — HEPARIN 100 UNIT/ML
500 SYRINGE INTRAVENOUS
Status: CANCELLED | OUTPATIENT
Start: 2022-10-04

## 2022-10-04 RX ORDER — SODIUM CHLORIDE 0.9 % (FLUSH) 0.9 %
10 SYRINGE (ML) INJECTION
Status: CANCELLED | OUTPATIENT
Start: 2022-10-04

## 2022-10-04 RX ORDER — SODIUM CHLORIDE 0.9 % (FLUSH) 0.9 %
10 SYRINGE (ML) INJECTION
Status: COMPLETED | OUTPATIENT
Start: 2022-10-04 | End: 2022-10-04

## 2022-10-04 RX ADMIN — SODIUM CHLORIDE, PRESERVATIVE FREE 10 ML: 5 INJECTION INTRAVENOUS at 12:10

## 2022-10-04 RX ADMIN — SODIUM CHLORIDE 1000 ML: 9 INJECTION, SOLUTION INTRAVENOUS at 11:10

## 2022-10-04 NOTE — PROGRESS NOTES
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 Intermittent alcohol use.  Procedure/surgical history: Back surgery (20-30 years ago, in use 10, apparently involving L5 fusion for prolapse this from work-related injury).  Social history:  Has been smoking a pack of cigarettes daily since age 17 years.  Has been drinking 6 beers daily for last 40 years.  Lately, trying to quit smoking and drinking.  Smokes marijuana.  .  Has 2 biological children of his own.  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  Lives in [place], [place]..  Family history:  Father  from some kind of cancer at age 72 years.  Health maintenance:  Does not have a regular primary care provider.  Does not visit with doctors.  No screening colonoscopy ever.     History of Present illness:  57-year-old gentleman     He was admitted to [facility] 2022 with 2 months history of progressive dysphagia, odynophagia, soreness of tongue, 20 lb weight loss and bilateral neck masses.  Also, hoarseness.  CT neck showed ulcerated mass in the tongue base, malignant-appearing, along with lymph node metastasis.  ENT performed a laryngoscopy with biopsy of tongue mass.  Friable base of tongue mass.  No airway compromise.  Right upper lung lobe indistinct, ground-glass lung nodules, typical of a benign inflammatory etiology.  Discharged 2022.  Patient reported weight loss of> 20 lb in previous 2 months.    Interval History 10/4/2022: Patient presents along with his wife for scheduled follow up for H&N cancer. He is due to receive hydration today. He reports doing well, however only consumes liquid form in diet. He says he loss his taste for food and as a result he  has no desire to eat any type of solid formed food. Patient encouraged to increase hydration. He denies any N/V/D/C, abdominal pain, abnormal bleeding, fever, chest pain, hearing loss, vision changes, bone pain. Lab work reviewed with patient, potassium level slightly elevated 5.4 and low H&H. Medications reviewed with patient, no refills needed.       Lab Results   Component Value Date    WBC 5.7 10/04/2022    HGB 8.7 (L) 10/04/2022    HCT 27.4 (L) 10/04/2022    MCV 95.5 (H) 10/04/2022     10/04/2022       CMP  Sodium Level   Date Value Ref Range Status   10/04/2022 140 136 - 145 mmol/L Final     Potassium Level   Date Value Ref Range Status   10/04/2022 5.4 (H) 3.5 - 5.1 mmol/L Final     Carbon Dioxide   Date Value Ref Range Status   10/04/2022 30 (H) 22 - 29 mmol/L Final     Blood Urea Nitrogen   Date Value Ref Range Status   10/04/2022 23.6 8.4 - 25.7 mg/dL Final     Creatinine   Date Value Ref Range Status   10/04/2022 1.37 (H) 0.73 - 1.18 mg/dL Final     Calcium Level Total   Date Value Ref Range Status   10/04/2022 8.8 8.4 - 10.2 mg/dL Final     Albumin Level   Date Value Ref Range Status   10/04/2022 3.2 (L) 3.5 - 5.0 gm/dL Final     Bilirubin Total   Date Value Ref Range Status   10/04/2022 0.3 <=1.5 mg/dL Final     Alkaline Phosphatase   Date Value Ref Range Status   10/04/2022 54 40 - 150 unit/L Final     Aspartate Aminotransferase   Date Value Ref Range Status   10/04/2022 14 5 - 34 unit/L Final     Alanine Aminotransferase   Date Value Ref Range Status   10/04/2022 9 0 - 55 unit/L Final     Estimated GFR-Non    Date Value Ref Range Status   07/18/2022 >60 mls/min/1.73/m2 Final       Physical Exam    Vitals & Measurements  Vitals:    10/04/22 1044   BP: 103/66   Pulse: 61   Resp: 20   Temp: 97.8 °F (36.6 °C)       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, p16 +:  -presentation: 07/2022:  Dysphagia, odynophagia, 20 lb weight loss, bilateral neck masses,  hoarseness  -CT soft tissues of the neck without contrast 07/05/2022, 07/29/2022  -S/P panendoscopy 07/06/2022  -ulcerated tongue base mass, at least 5 cm  -ulcerated friable mass base of the tongue, confined base of tongue on panendoscopy  -right level 2 lymph node 3.2 x 2.9 cm on CT  -left level 3 lymph node 5.1 x 4.2 cm on CT  -invasive squamous cell carcinoma, grade 3 of 4; p16 +  -no intrathoracic metastasis on CT chest  >>cT3 cN2 M0, clinical stage II        # history of tobacco abuse since age 17     # history of alcohol use    Plan:   --ENT agreed with plan of concurrent chemoradiation therapy  -Plan: High-dose cisplatin concurrent with RT  -Cisplatin 100 mg/m² IV days 1, 22, and 23, concurrent with RT  -high-dose cisplatin started 08/25/2022, concurrent with RT; cycle 2 of cisplatin 09/19/2022  -continue to monitor for toxicity  -check CBC, CMP, and magnesium level every 7-10 days       Hydration with lab work in 1 week on 10/11/2022  Follow-up with NP in 2 weeks, with lab work prior to cycle 3 of Cisplatin      -tentatively speaking, will re-stage with PET-CT 3 months post completion of chemoradiation therapy     -developed extensive DVT right upper extremity, including subclavian vein, axillary vein, and basilic vein 09/15/2022, secondary to PICC line  -right-sided PICC line removed; MediPort was unable to place; chemotherapy is being continued with PICC line on the left side   -anticoagulation with Xarelto started 09/15/2022  -anticoagulation to continue for at least 3 months (until the middle/end of December ' 22)  (He hopes to pickup Xarelto from pharmacy today)     -For follow-up after chemoradiation therapy, see below     -smoking cessation counseling:  Smoking and alcohol cessation discussed in detail.     Have sent a consultation request to Internal Medicine for him to get established with a PCP.    Above discussed with him.  All questions answered.    He and his wife understand and agree with this  plan.  -------------------  Discussion:  Chemotherapy:   High-dose cisplatin: Days 1, 22, and 43: Cisplatin 100 mg/m² IV + concurrent RT     Post CRT neck evaluation:   4-8 weeks clinical assessment as appropriate:   1.  If residual primary, persistent disease, or progression: Then assess extent of disease or distant metastasis with CT or MRI with contrast or FDG PET/CT; followed by resection   2.  If response: Then, assess extent of disease or distant metastasis with FDG PET/CT at minimum 12 weeks weeks (preferred), or CT of primary and neck and/or MRI with contrast at 8-12 weeks

## 2022-10-04 NOTE — PROGRESS NOTES
"Reason for Visit:  New Chemo Treatment: Squamous Cell Carcinoma of base of Tongue        PMHx: Tobacco Use, ETOH Use     Height: 5'10"  Weight:  Wt Readings from Last 15 Encounters:   10/04/22 64.9 kg (143 lb)   09/26/22 64.1 kg (141 lb 5 oz)   09/22/22 66.7 kg (147 lb)   09/20/22 68 kg (149 lb 14.6 oz)   09/19/22 65.8 kg (145 lb 1 oz)   09/15/22 66.2 kg (146 lb)   09/15/22 66.1 kg (145 lb 11.6 oz)   09/08/22 65.3 kg (143 lb 15.4 oz)   09/01/22 65.1 kg (143 lb 8.3 oz)   08/25/22 67.3 kg (148 lb 5.9 oz)   08/23/22 69.5 kg (153 lb 3.5 oz)   08/16/22 69.9 kg (154 lb 1.6 oz)   08/16/22 69.4 kg (153 lb)   08/05/22 71 kg (156 lb 8.4 oz)   07/26/22 72.2 kg (159 lb 2.8 oz)       Usual BW: ~200 lb, last weighing in Jan 2022  Weight Change: ~35% wt loss x 7 months  IBW: 166 lb  BMI: 20.5 (normal)     Allergies: Patient has no known allergies.     Current Medications:     Current Outpatient Medications:     amLODIPine (NORVASC) 2.5 MG tablet, Take 2.5 mg by mouth once daily., Disp: , Rfl:     ondansetron (ZOFRAN-ODT) 8 MG TbDL, Take 1 tablet (8 mg total) by mouth every 8 (eight) hours as needed (nausea)., Disp: 90 tablet, Rfl: 3     Labs:  8/23/22 -- H/H 11.4/34,WBC 9.3, Glu 125, K 4.1, BUN 14, Cr 0.87  10/4/22 -- H/H 8.7/27 L, K 4.5, BUN 15.8, Cr 1     Diet History:   8/25/22 -- Pt beginning chemo this am; denies dysphagia currently; reports difficulty chewing d/t recent dental extractions x 2 weeks ago, most difficulty with meats, discussed soft protein sources to incorporate in diet such as PB, lentils, eggs, yogurt, milk & ONS; pt reports drinking ~ 5 Ensure daily, unsure if Original or Plus -- advised to use Ensure Plus to provide 350 kcal, 13 gm protein per serving to best meet nutrition needs; reports n/v yesterday morning, no vomiting this am or prior to onset of yesterday; Significant wt loss noted per EMR, pt's wife reports pt's UBW ~200 lb last weighing in Jan 2022    10/4/22 -- Pt continues with difficulty " swallowing foods, reports only taking in liquids & soups; Pt is drinking Ensure Plus x 5 cans daily providing 1750 kcal, 65 gm protein; Encouraged continued use of Ensure Plus x 5 daily to meet > 75% of nutrition needs; no n/v; pt reports 1 chemo treatment left         Nutrition Diagnosis:   1.)Problem:  malnutrition  Etiology (related to): difficulty chewing and tumor location  Signs/Symptoms (as evidenced by): <75% nutrition intake > 1 month, >5% wt loss x 1 month      Malnutrition in the context of chronic illness  Degree of Malnutrition: severe malnutrition  Energy Intake: < 75% of estimated energy requirement for >/= 1 month  Interpretation of Weight Loss: >5% in 1 month  Body Fat: unable to obtain  Area of Body Fat Loss: unable to obtain  Muscle Mass Loss: unable to obtain  Area of Muscle Mass Loss: unable to obtain  Fluid Accumulation: not applicable  Edema: not applicable  Reduced  Strength: unable to obtain  A minimum of two characteristics is recommended for diagnosis of either severe or non-severe malnutrition.     Nutrition Rx: based on current wt 65 kg  EEN: 1950 - 2080 kcal (30 - 32 kcal/kg)  EPN: 85 gm (1.3 gm/kg)  FLD: 1677-0728 ml (1 ml/kcal)     Intervention:  1.  Soft, Easy to Chew diet  2.  Ensure Plus 5 daily to provide 1750 kcal (90% kcal), 65 gm protein (76% pro)  3.  Monitor Weights Weekly   4.  Suggest MVI

## 2022-10-04 NOTE — NURSING
Pt did labs, saw provider, and is here for PICC dressing change and 1 L NS.  Pt denies any pain or complaints.

## 2022-10-10 ENCOUNTER — DOCUMENTATION ONLY (OUTPATIENT)
Dept: HEMATOLOGY/ONCOLOGY | Facility: CLINIC | Age: 57
End: 2022-10-10
Payer: MEDICAID

## 2022-10-10 ENCOUNTER — INFUSION (OUTPATIENT)
Dept: INFUSION THERAPY | Facility: HOSPITAL | Age: 57
End: 2022-10-10
Attending: INTERNAL MEDICINE
Payer: MEDICAID

## 2022-10-10 VITALS
HEIGHT: 71 IN | RESPIRATION RATE: 20 BRPM | WEIGHT: 150.13 LBS | BODY MASS INDEX: 21.02 KG/M2 | DIASTOLIC BLOOD PRESSURE: 67 MMHG | HEART RATE: 86 BPM | SYSTOLIC BLOOD PRESSURE: 108 MMHG

## 2022-10-10 DIAGNOSIS — C01 SQUAMOUS CELL CARCINOMA OF BASE OF TONGUE: Primary | ICD-10-CM

## 2022-10-10 LAB
ALBUMIN SERPL-MCNC: 3 GM/DL (ref 3.5–5)
ALBUMIN/GLOB SERPL: 1 RATIO (ref 1.1–2)
ALP SERPL-CCNC: 48 UNIT/L (ref 40–150)
ALT SERPL-CCNC: 7 UNIT/L (ref 0–55)
AST SERPL-CCNC: 13 UNIT/L (ref 5–34)
BASOPHILS # BLD AUTO: 0.01 X10(3)/MCL (ref 0–0.2)
BASOPHILS NFR BLD AUTO: 0.5 %
BILIRUBIN DIRECT+TOT PNL SERPL-MCNC: 0.3 MG/DL
BUN SERPL-MCNC: 16.8 MG/DL (ref 8.4–25.7)
CALCIUM SERPL-MCNC: 8.7 MG/DL (ref 8.4–10.2)
CHLORIDE SERPL-SCNC: 104 MMOL/L (ref 98–107)
CO2 SERPL-SCNC: 30 MMOL/L (ref 22–29)
CREAT SERPL-MCNC: 1.06 MG/DL (ref 0.73–1.18)
EOSINOPHIL # BLD AUTO: 0.1 X10(3)/MCL (ref 0–0.9)
EOSINOPHIL NFR BLD AUTO: 5.1 %
ERYTHROCYTE [DISTWIDTH] IN BLOOD BY AUTOMATED COUNT: 14.2 % (ref 11.5–17)
GFR SERPLBLD CREATININE-BSD FMLA CKD-EPI: >60 MLS/MIN/1.73/M2
GLOBULIN SER-MCNC: 3.1 GM/DL (ref 2.4–3.5)
GLUCOSE SERPL-MCNC: 89 MG/DL (ref 74–100)
HCT VFR BLD AUTO: 27.9 % (ref 42–52)
HGB BLD-MCNC: 8.7 GM/DL (ref 14–18)
IMM GRANULOCYTES # BLD AUTO: 0 X10(3)/MCL (ref 0–0.04)
IMM GRANULOCYTES NFR BLD AUTO: 0 %
LYMPHOCYTES # BLD AUTO: 0.21 X10(3)/MCL (ref 0.6–4.6)
LYMPHOCYTES NFR BLD AUTO: 10.7 %
MAGNESIUM SERPL-MCNC: 2.1 MG/DL (ref 1.6–2.6)
MCH RBC QN AUTO: 29.8 PG (ref 27–31)
MCHC RBC AUTO-ENTMCNC: 31.2 MG/DL (ref 33–36)
MCV RBC AUTO: 95.5 FL (ref 80–94)
MONOCYTES # BLD AUTO: 0.55 X10(3)/MCL (ref 0.1–1.3)
MONOCYTES NFR BLD AUTO: 27.9 %
NEUTROPHILS # BLD AUTO: 1.1 X10(3)/MCL (ref 2.1–9.2)
NEUTROPHILS NFR BLD AUTO: 55.8 %
NRBC BLD AUTO-RTO: 0 %
PLATELET # BLD AUTO: 219 X10(3)/MCL (ref 130–400)
PMV BLD AUTO: 9 FL (ref 7.4–10.4)
POTASSIUM SERPL-SCNC: 5 MMOL/L (ref 3.5–5.1)
PROT SERPL-MCNC: 6.1 GM/DL (ref 6.4–8.3)
RBC # BLD AUTO: 2.92 X10(6)/MCL (ref 4.7–6.1)
SODIUM SERPL-SCNC: 141 MMOL/L (ref 136–145)
WBC # SPEC AUTO: 2 X10(3)/MCL (ref 4.5–11.5)

## 2022-10-10 PROCEDURE — 63600175 PHARM REV CODE 636 W HCPCS: Mod: JG | Performed by: INTERNAL MEDICINE

## 2022-10-10 PROCEDURE — 96413 CHEMO IV INFUSION 1 HR: CPT

## 2022-10-10 PROCEDURE — 25000003 PHARM REV CODE 250: Performed by: INTERNAL MEDICINE

## 2022-10-10 PROCEDURE — 36415 COLL VENOUS BLD VENIPUNCTURE: CPT | Performed by: INTERNAL MEDICINE

## 2022-10-10 PROCEDURE — 96361 HYDRATE IV INFUSION ADD-ON: CPT

## 2022-10-10 PROCEDURE — 96366 THER/PROPH/DIAG IV INF ADDON: CPT

## 2022-10-10 PROCEDURE — 96360 HYDRATION IV INFUSION INIT: CPT

## 2022-10-10 PROCEDURE — 25000003 PHARM REV CODE 250: Performed by: NURSE PRACTITIONER

## 2022-10-10 PROCEDURE — A4216 STERILE WATER/SALINE, 10 ML: HCPCS | Performed by: INTERNAL MEDICINE

## 2022-10-10 PROCEDURE — 96365 THER/PROPH/DIAG IV INF INIT: CPT

## 2022-10-10 PROCEDURE — 96375 TX/PRO/DX INJ NEW DRUG ADDON: CPT

## 2022-10-10 PROCEDURE — 63600175 PHARM REV CODE 636 W HCPCS: Performed by: NURSE PRACTITIONER

## 2022-10-10 PROCEDURE — 96523 IRRIG DRUG DELIVERY DEVICE: CPT

## 2022-10-10 PROCEDURE — 96368 THER/DIAG CONCURRENT INF: CPT

## 2022-10-10 PROCEDURE — 96367 TX/PROPH/DG ADDL SEQ IV INF: CPT

## 2022-10-10 RX ORDER — SODIUM CHLORIDE 0.9 % (FLUSH) 0.9 %
10 SYRINGE (ML) INJECTION
Status: COMPLETED | OUTPATIENT
Start: 2022-10-10 | End: 2022-10-10

## 2022-10-10 RX ORDER — HEPARIN 100 UNIT/ML
500 SYRINGE INTRAVENOUS
Status: DISCONTINUED | OUTPATIENT
Start: 2022-10-10 | End: 2022-10-10 | Stop reason: HOSPADM

## 2022-10-10 RX ORDER — SODIUM CHLORIDE 0.9 % (FLUSH) 0.9 %
10 SYRINGE (ML) INJECTION
Status: DISCONTINUED | OUTPATIENT
Start: 2022-10-10 | End: 2022-10-10 | Stop reason: HOSPADM

## 2022-10-10 RX ORDER — HEPARIN 100 UNIT/ML
500 SYRINGE INTRAVENOUS
Status: CANCELLED | OUTPATIENT
Start: 2022-10-10

## 2022-10-10 RX ORDER — SODIUM CHLORIDE 0.9 % (FLUSH) 0.9 %
10 SYRINGE (ML) INJECTION
Status: CANCELLED | OUTPATIENT
Start: 2022-10-10

## 2022-10-10 RX ADMIN — CISPLATIN 187 MG: 1 INJECTION INTRAVENOUS at 12:10

## 2022-10-10 RX ADMIN — Medication 10 ML: at 11:10

## 2022-10-10 RX ADMIN — APREPITANT 130 MG: 130 INJECTION, EMULSION INTRAVENOUS at 12:10

## 2022-10-10 RX ADMIN — SODIUM CHLORIDE 1000 ML: 9 INJECTION, SOLUTION INTRAVENOUS at 11:10

## 2022-10-10 RX ADMIN — DEXAMETHASONE SODIUM PHOSPHATE 0.25 MG: 4 INJECTION, SOLUTION INTRA-ARTICULAR; INTRALESIONAL; INTRAMUSCULAR; INTRAVENOUS; SOFT TISSUE at 12:10

## 2022-10-10 RX ADMIN — MAGNESIUM SULFATE HEPTAHYDRATE: 500 INJECTION, SOLUTION INTRAMUSCULAR; INTRAVENOUS at 12:10

## 2022-10-10 NOTE — PROGRESS NOTES
Okay to proceed with #3 of Cisplatin today (10/10/2022) despite ANC  1100  Return to infusion tomorrow 10/11/2022 for hydration  Return to infusion on 10/17/2022 for hydration and lab work  Follow up with np in 3 weeks on 10/31/2022 with lab work and infusion for hydration

## 2022-10-11 ENCOUNTER — INFUSION (OUTPATIENT)
Dept: INFUSION THERAPY | Facility: HOSPITAL | Age: 57
End: 2022-10-11
Attending: INTERNAL MEDICINE
Payer: MEDICAID

## 2022-10-11 VITALS
DIASTOLIC BLOOD PRESSURE: 72 MMHG | BODY MASS INDEX: 21.76 KG/M2 | HEIGHT: 71 IN | RESPIRATION RATE: 20 BRPM | WEIGHT: 155.44 LBS | HEART RATE: 60 BPM | SYSTOLIC BLOOD PRESSURE: 116 MMHG | OXYGEN SATURATION: 98 %

## 2022-10-11 DIAGNOSIS — C01 SQUAMOUS CELL CARCINOMA OF BASE OF TONGUE: Primary | ICD-10-CM

## 2022-10-11 PROCEDURE — 25000003 PHARM REV CODE 250: Performed by: INTERNAL MEDICINE

## 2022-10-11 PROCEDURE — 96360 HYDRATION IV INFUSION INIT: CPT

## 2022-10-11 PROCEDURE — A4216 STERILE WATER/SALINE, 10 ML: HCPCS | Performed by: INTERNAL MEDICINE

## 2022-10-11 RX ORDER — SODIUM CHLORIDE 0.9 % (FLUSH) 0.9 %
10 SYRINGE (ML) INJECTION
Status: DISCONTINUED | OUTPATIENT
Start: 2022-10-11 | End: 2022-10-11 | Stop reason: HOSPADM

## 2022-10-11 RX ORDER — HEPARIN 100 UNIT/ML
500 SYRINGE INTRAVENOUS
Status: DISCONTINUED | OUTPATIENT
Start: 2022-10-11 | End: 2022-10-11 | Stop reason: HOSPADM

## 2022-10-11 RX ADMIN — Medication 10 ML: at 01:10

## 2022-10-11 RX ADMIN — SODIUM CHLORIDE 1000 ML: 9 INJECTION, SOLUTION INTRAVENOUS at 01:10

## 2022-10-17 ENCOUNTER — INFUSION (OUTPATIENT)
Dept: INFUSION THERAPY | Facility: HOSPITAL | Age: 57
End: 2022-10-17
Attending: INTERNAL MEDICINE
Payer: MEDICAID

## 2022-10-17 VITALS
WEIGHT: 148.56 LBS | BODY MASS INDEX: 20.8 KG/M2 | HEART RATE: 73 BPM | SYSTOLIC BLOOD PRESSURE: 123 MMHG | DIASTOLIC BLOOD PRESSURE: 86 MMHG | OXYGEN SATURATION: 100 % | TEMPERATURE: 98 F | RESPIRATION RATE: 20 BRPM

## 2022-10-17 DIAGNOSIS — C01 SQUAMOUS CELL CARCINOMA OF BASE OF TONGUE: Primary | ICD-10-CM

## 2022-10-17 PROCEDURE — 25000003 PHARM REV CODE 250: Performed by: NURSE PRACTITIONER

## 2022-10-17 PROCEDURE — 96523 IRRIG DRUG DELIVERY DEVICE: CPT | Mod: XB

## 2022-10-17 PROCEDURE — A4216 STERILE WATER/SALINE, 10 ML: HCPCS | Performed by: INTERNAL MEDICINE

## 2022-10-17 PROCEDURE — 96360 HYDRATION IV INFUSION INIT: CPT

## 2022-10-17 PROCEDURE — 25000003 PHARM REV CODE 250: Performed by: INTERNAL MEDICINE

## 2022-10-17 RX ORDER — SODIUM CHLORIDE 0.9 % (FLUSH) 0.9 %
10 SYRINGE (ML) INJECTION
OUTPATIENT
Start: 2022-10-20

## 2022-10-17 RX ORDER — SODIUM CHLORIDE 0.9 % (FLUSH) 0.9 %
10 SYRINGE (ML) INJECTION
Status: COMPLETED | OUTPATIENT
Start: 2022-10-17 | End: 2022-10-17

## 2022-10-17 RX ORDER — SODIUM CHLORIDE 0.9 % (FLUSH) 0.9 %
10 SYRINGE (ML) INJECTION
OUTPATIENT
Start: 2022-10-17

## 2022-10-17 RX ORDER — HEPARIN 100 UNIT/ML
500 SYRINGE INTRAVENOUS
Status: DISCONTINUED | OUTPATIENT
Start: 2022-10-17 | End: 2022-10-17 | Stop reason: HOSPADM

## 2022-10-17 RX ORDER — HEPARIN 100 UNIT/ML
500 SYRINGE INTRAVENOUS
OUTPATIENT
Start: 2022-10-20

## 2022-10-17 RX ORDER — HEPARIN 100 UNIT/ML
500 SYRINGE INTRAVENOUS
OUTPATIENT
Start: 2022-10-17

## 2022-10-17 RX ORDER — SODIUM CHLORIDE 0.9 % (FLUSH) 0.9 %
10 SYRINGE (ML) INJECTION
Status: DISCONTINUED | OUTPATIENT
Start: 2022-10-17 | End: 2022-10-17 | Stop reason: HOSPADM

## 2022-10-17 RX ADMIN — Medication 10 ML: at 11:10

## 2022-10-17 RX ADMIN — SODIUM CHLORIDE 1000 ML: 9 INJECTION, SOLUTION INTRAVENOUS at 11:10

## 2022-10-24 ENCOUNTER — INFUSION (OUTPATIENT)
Dept: INFUSION THERAPY | Facility: HOSPITAL | Age: 57
End: 2022-10-24
Attending: INTERNAL MEDICINE
Payer: MEDICAID

## 2022-10-24 VITALS
OXYGEN SATURATION: 100 % | HEART RATE: 71 BPM | DIASTOLIC BLOOD PRESSURE: 79 MMHG | RESPIRATION RATE: 18 BRPM | WEIGHT: 158.94 LBS | SYSTOLIC BLOOD PRESSURE: 122 MMHG | TEMPERATURE: 98 F | BODY MASS INDEX: 22.25 KG/M2

## 2022-10-24 DIAGNOSIS — C01 SQUAMOUS CELL CARCINOMA OF BASE OF TONGUE: Primary | ICD-10-CM

## 2022-10-24 PROCEDURE — 96523 IRRIG DRUG DELIVERY DEVICE: CPT

## 2022-10-24 NOTE — PROGRESS NOTES
Patient has completed x3 dose of Cisplatin as ordered via picc line. He request to have Picc line removed today. Ordered to discontinue Picc line today submitted.

## 2022-10-31 ENCOUNTER — OFFICE VISIT (OUTPATIENT)
Dept: HEMATOLOGY/ONCOLOGY | Facility: CLINIC | Age: 57
End: 2022-10-31
Payer: MEDICAID

## 2022-10-31 VITALS
OXYGEN SATURATION: 100 % | DIASTOLIC BLOOD PRESSURE: 85 MMHG | BODY MASS INDEX: 23.1 KG/M2 | TEMPERATURE: 99 F | HEIGHT: 71 IN | RESPIRATION RATE: 20 BRPM | HEART RATE: 82 BPM | WEIGHT: 165 LBS | SYSTOLIC BLOOD PRESSURE: 133 MMHG

## 2022-10-31 DIAGNOSIS — C01 SQUAMOUS CELL CARCINOMA OF BASE OF TONGUE: Primary | ICD-10-CM

## 2022-10-31 PROCEDURE — 1159F MED LIST DOCD IN RCRD: CPT | Mod: CPTII,,, | Performed by: NURSE PRACTITIONER

## 2022-10-31 PROCEDURE — 99214 OFFICE O/P EST MOD 30 MIN: CPT | Mod: PBBFAC | Performed by: NURSE PRACTITIONER

## 2022-10-31 PROCEDURE — 99214 PR OFFICE/OUTPT VISIT, EST, LEVL IV, 30-39 MIN: ICD-10-PCS | Mod: S$PBB,,, | Performed by: NURSE PRACTITIONER

## 2022-10-31 PROCEDURE — 1160F RVW MEDS BY RX/DR IN RCRD: CPT | Mod: CPTII,,, | Performed by: NURSE PRACTITIONER

## 2022-10-31 PROCEDURE — 1160F PR REVIEW ALL MEDS BY PRESCRIBER/CLIN PHARMACIST DOCUMENTED: ICD-10-PCS | Mod: CPTII,,, | Performed by: NURSE PRACTITIONER

## 2022-10-31 PROCEDURE — 3079F DIAST BP 80-89 MM HG: CPT | Mod: CPTII,,, | Performed by: NURSE PRACTITIONER

## 2022-10-31 PROCEDURE — 3075F SYST BP GE 130 - 139MM HG: CPT | Mod: CPTII,,, | Performed by: NURSE PRACTITIONER

## 2022-10-31 PROCEDURE — 3079F PR MOST RECENT DIASTOLIC BLOOD PRESSURE 80-89 MM HG: ICD-10-PCS | Mod: CPTII,,, | Performed by: NURSE PRACTITIONER

## 2022-10-31 PROCEDURE — 3075F PR MOST RECENT SYSTOLIC BLOOD PRESS GE 130-139MM HG: ICD-10-PCS | Mod: CPTII,,, | Performed by: NURSE PRACTITIONER

## 2022-10-31 PROCEDURE — 99214 OFFICE O/P EST MOD 30 MIN: CPT | Mod: S$PBB,,, | Performed by: NURSE PRACTITIONER

## 2022-10-31 PROCEDURE — 1159F PR MEDICATION LIST DOCUMENTED IN MEDICAL RECORD: ICD-10-PCS | Mod: CPTII,,, | Performed by: NURSE PRACTITIONER

## 2022-10-31 RX ORDER — AMLODIPINE BESYLATE 2.5 MG/1
2.5 TABLET ORAL DAILY
Qty: 30 TABLET | Refills: 1 | Status: SHIPPED | OUTPATIENT
Start: 2022-10-31 | End: 2022-11-15 | Stop reason: DRUGHIGH

## 2022-10-31 NOTE — PROGRESS NOTES
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 Intermittent alcohol use.  Procedure/surgical history: Back surgery (20-30 years ago, in use 10, apparently involving L5 fusion for prolapse this from work-related injury).  Social history:  Has been smoking a pack of cigarettes daily since age 17 years.  Has been drinking 6 beers daily for last 40 years.  Lately, trying to quit smoking and drinking.  Smokes marijuana.  .  Has 2 biological children of his own.  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  Lives in [place], [place]..  Family history:  Father  from some kind of cancer at age 72 years.  Health maintenance:  Does not have a regular primary care provider.  Does not visit with doctors.  No screening colonoscopy ever.     History of Present illness:  57-year-old gentleman     He was admitted to [facility] 2022 with 2 months history of progressive dysphagia, odynophagia, soreness of tongue, 20 lb weight loss and bilateral neck masses.  Also, hoarseness.  CT neck showed ulcerated mass in the tongue base, malignant-appearing, along with lymph node metastasis.  ENT performed a laryngoscopy with biopsy of tongue mass.  Friable base of tongue mass.  No airway compromise.  Right upper lung lobe indistinct, ground-glass lung nodules, typical of a benign inflammatory etiology.  Discharged 2022.  Patient reported weight loss of> 20 lb in previous 2 months.    Interval History 10/31/2022: Patient presents along with his wife for scheduled follow up for H&N cancer. He has completed CRT 3 weeks ago, and reports tongue pain. However, patient says that his appetite has improved and he is now able to eat solid food. Denies any difficulty  swallowing, or sore throat. He is UTD with ENT appointments and aware of upcoming. Lab work reviewed with patient, Hgb 7.5. He denies any SOB, fatigue, palpitations, chest pain, or abnormal bleeding. Discussed follow up appointments with patient. Plan to assess response 12 weeks post CRT, we will complete imaging early Jan. 2023.    Lab Results   Component Value Date    WBC 2.1 (L) 10/31/2022    HGB 7.5 (L) 10/31/2022    HCT 24.2 (L) 10/31/2022    MCV 98.0 (H) 10/31/2022     10/31/2022       CMP  Sodium Level   Date Value Ref Range Status   10/31/2022 138 136 - 145 mmol/L Final     Potassium Level   Date Value Ref Range Status   10/31/2022 4.1 3.5 - 5.1 mmol/L Final     Carbon Dioxide   Date Value Ref Range Status   10/31/2022 28 22 - 29 mmol/L Final     Blood Urea Nitrogen   Date Value Ref Range Status   10/31/2022 16.1 8.4 - 25.7 mg/dL Final     Creatinine   Date Value Ref Range Status   10/31/2022 0.83 0.73 - 1.18 mg/dL Final     Calcium Level Total   Date Value Ref Range Status   10/31/2022 8.8 8.4 - 10.2 mg/dL Final     Albumin Level   Date Value Ref Range Status   10/31/2022 3.2 (L) 3.5 - 5.0 gm/dL Final     Bilirubin Total   Date Value Ref Range Status   10/31/2022 0.3 <=1.5 mg/dL Final     Alkaline Phosphatase   Date Value Ref Range Status   10/31/2022 45 40 - 150 unit/L Final     Aspartate Aminotransferase   Date Value Ref Range Status   10/31/2022 12 5 - 34 unit/L Final     Alanine Aminotransferase   Date Value Ref Range Status   10/31/2022 9 0 - 55 unit/L Final     Estimated GFR-Non    Date Value Ref Range Status   07/18/2022 >60 mls/min/1.73/m2 Final       Physical Exam    Vitals & Measurements  Vitals:    10/31/22 0953   BP: 133/85   Pulse: 82   Resp: 20   Temp: 99 °F (37.2 °C)     General: Alert and oriented. NAD  Eye: Pupils are equal, round and reactive to light, Extraocular movements are intact. Normal conjunctiva  HENT: Normocephalic. Oropharynx exam deferred; mask in place  due to coronavirus  Neck: Supple, Non-tender  Respiratory: Respirations are non-labored, Symmetrical chest wall expansion. Breath sounds CTA bilaterally  Cardiovascular: Regular rate, rhythm, Normal peripheral perfusion, No bilateral lower extremity edema  Gastrointestinal: Non-distended, Present bowel sounds   Genitourinary: Exam deferred  Lymphatics: No lymphadenopathy appreciated  Musculoskeletal: Moves all extremities  Integumentary: Intact. Warm, dry. No rashes, or lesions to visible skin  Neurologic: No focal deficits  Psychiatric: Cooperative. Appropriate mood and affect   ECOG Performance Scale: 1 - Capable of all self-care able to carry out light work activities.    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, p16 +:  -presentation: 07/2022:  Dysphagia, odynophagia, 20 lb weight loss, bilateral neck masses, hoarseness  -CT soft tissues of the neck without contrast 07/05/2022, 07/29/2022  -S/P panendoscopy 07/06/2022  -ulcerated tongue base mass, at least 5 cm  -ulcerated friable mass base of the tongue, confined base of tongue on panendoscopy  -right level 2 lymph node 3.2 x 2.9 cm on CT  -left level 3 lymph node 5.1 x 4.2 cm on CT  -invasive squamous cell carcinoma, grade 3 of 4; p16 +  -no intrathoracic metastasis on CT chest  >>cT3 cN2 M0, clinical stage II        # history of tobacco abuse since age 17     # history of alcohol use    Plan:   --ENT agreed with plan of concurrent chemoradiation therapy  -Plan: High-dose cisplatin concurrent with RT  -Cisplatin 100 mg/m² IV days 1, 22, and 23, concurrent with RT  -high-dose cisplatin started 08/25/2022, concurrent with RT; completed 10/10/2022       -Follow-up with NP in 4 weeks, with lab work    -tentatively speaking, will re-stage with PET-CT 3 months post completion of chemoradiation therapy-early Jan. 2023     -developed extensive DVT right upper extremity, including subclavian vein, axillary vein, and basilic vein 09/15/2022, secondary  to PICC line  -right-sided PICC line removed; MediPort was unable to place; chemotherapy is being continued with PICC line on the left side   -anticoagulation with Xarelto started 09/15/2022  -anticoagulation to continue for at least 3 months (until the middle/end of December ' 22)  (He hopes to pickup Xarelto from pharmacy today)     -For follow-up after chemoradiation therapy, see below     -smoking cessation counseling:  Smoking and alcohol cessation discussed in detail.     Have sent a consultation request to Internal Medicine for him to get established with a PCP.    Above discussed with him.  All questions answered.    He and his wife understand and agree with this plan.  -------------------  Discussion:  Chemotherapy:   High-dose cisplatin: Days 1, 22, and 43: Cisplatin 100 mg/m² IV + concurrent RT     Post CRT neck evaluation:   4-8 weeks clinical assessment as appropriate:   1.  If residual primary, persistent disease, or progression: Then assess extent of disease or distant metastasis with CT or MRI with contrast or FDG PET/CT; followed by resection   2.  If response: Then, assess extent of disease or distant metastasis with FDG PET/CT at minimum 12 weeks weeks (preferred), or CT of primary and neck and/or MRI with contrast at 8-12 weeks

## 2022-10-31 NOTE — Clinical Note
Lab work only in 1 week on 11/7/2022  85yo Female with mhx of HFpEF ef 65% CAD (CABG 2000, stent 2011), severe pulm HTN, Bioprosthetic aortic and mitral valve replacement 9/2014, ?CKD per outside records, afib on warfarin, chronic Lt loculated pleural effusion,  Type 2 DM, HTN, HLD, gout a/w acute on chronic HFpEF i/s/p severe pHTN c/b hypercapnia; Found to have persisting moderate size loculated lt pleural effusion with atelectasis;

## 2022-11-03 ENCOUNTER — OFFICE VISIT (OUTPATIENT)
Dept: OTOLARYNGOLOGY | Facility: CLINIC | Age: 57
End: 2022-11-03
Payer: MEDICAID

## 2022-11-03 VITALS
HEART RATE: 86 BPM | TEMPERATURE: 99 F | RESPIRATION RATE: 16 BRPM | SYSTOLIC BLOOD PRESSURE: 142 MMHG | WEIGHT: 163 LBS | HEIGHT: 70 IN | DIASTOLIC BLOOD PRESSURE: 82 MMHG | BODY MASS INDEX: 23.34 KG/M2

## 2022-11-03 DIAGNOSIS — C77.0 SECONDARY MALIGNANT NEOPLASM OF CERVICAL LYMPH NODE: ICD-10-CM

## 2022-11-03 DIAGNOSIS — C01 SQUAMOUS CELL CARCINOMA OF BASE OF TONGUE: Primary | ICD-10-CM

## 2022-11-03 PROCEDURE — 31575 DIAGNOSTIC LARYNGOSCOPY: CPT | Mod: PBBFAC | Performed by: STUDENT IN AN ORGANIZED HEALTH CARE EDUCATION/TRAINING PROGRAM

## 2022-11-03 PROCEDURE — 99214 OFFICE O/P EST MOD 30 MIN: CPT | Mod: PBBFAC,25 | Performed by: STUDENT IN AN ORGANIZED HEALTH CARE EDUCATION/TRAINING PROGRAM

## 2022-11-03 RX ORDER — HYDROCODONE BITARTRATE AND ACETAMINOPHEN 10; 325 MG/1; MG/1
1 TABLET ORAL EVERY 6 HOURS PRN
COMMUNITY
Start: 2022-10-31 | End: 2022-11-15 | Stop reason: DRUGHIGH

## 2022-11-03 NOTE — PROGRESS NOTES
[facility]  Otolaryngology Clinic Note    Yaron Brennan  Encounter Date: 11/3/2022  YOB: 1965  Physician: Jose Sales    Chief Complaint: Otalgia, neck mass    HPI: Yaron Brennan is a 57 y.o. male with HTN who presents as referral from Dr. Welsh for squamous cell carcinoma of base of tongue. Patient reports that toward the beginning of the year he started to have worsening left otalgia. He then noticed a bump/swelling on left neck that he thought was was due to a bug bite. This got progressively bigger over time and he then noticed some swelling on right neck as well. He eventually presented to [facility] in early July 2022. He had a CT neck that showed an ulcerated mass in the tongue base, and underwent direct laryngoscopy with ENT on 7/6/22 (Dr. Tay). Operative findings: ulcerated friable mass base of the tongue that seems confined to base of tongue; lingual surface of epiglottis is free from tumor; mass involves bilateral base of tongue, significantly midline. Results returned positive for p16+ SCCa. Today in clinic patient reports bilateral L>R otalgia, mild dysphagia/odynophagia. He reports he eats basically whatever he wants without overt signs of aspiration. He does report 40lb weight loss in the last 6 months. + Neck LAD. Reports voice changes. Denies any issues breathing. Smoked 1ppd since 17 years old (42 years) but quit a month ago when he got his diagnosis. Also previously drank 12 pack of beer a day and likewise quite a month ago. Referred to ENT for consideration of possible surgical resection.     07/05/2022: CT soft tissue of the neck without contrast:  -ulcerated tongue base mass, at least 3.4 cm by 3.0 cm x 3.1 cm; associated bulky level 2 and level 3 cervical lymphadenopathy; suspected level 1 and level 4 cervical lymphadenopathy; a right level 2 lymph node with central necrosis 2.6 x 3.3 cm; a left level 3 lymph node 4.5 x 3.2 cm; no  significant airway compromise; no acute or aggressive bony lesions  -malignant ulcerated type mass with lymph node metastasis; no airway compromise; right upper lung lobe indistinct ground-glass pulmonary nodules typical of benign infectious or inflammatory etiology    8/16/22: Here today for follow up. Had dental extractions performed on 8/5. Not having any issues. Taking in 4-5 ensures daily. Trying to take some other soft foods like grits and pudding.     9/22/22: Here for follow up. Patient is undergoing CRT and tolerating it well. He reports he has one more chemotherapy session left and about 2-3 weeks of radiation left. Overall maintaining his weight. No progressive dysphagia, swallowing fine. Also no breathing issues or other complaints.    11/3/22: Patient is here today for follow up.  He finished CRT on 10/10.  Patient states he is still in some pain in  his neck and inside of his mouth.   He is tolerating p.o. intake but states at times he does have difficulty swallowing.  He does have xerostomia.  Patient has not used his eardrops to loosen the cerumen impaction.  Denies any shortness of breath or difficulty breathing/ changes to voice,  changes to his neck masses.    ROS:   General: Negative except per HPI  Skin: Denies rash, ulcer, or lesion.  Eyes: Denies vision changes or diplopia.  Ears: Negative except per HPI  Nose: Negative except per HPI  Throat/mouth: Negative except per HPI  Cardiovascular: Negative except per HPI  Respiratory: Negative except per HPI  Neck: Negative except per HPI  Endocrine: Negative except per HPI  Neurologic: Negative except per HPI    Other 10-point review of systems negative except per HPI      Review of patient's allergies indicates:  No Known Allergies    Past Medical History:   Diagnosis Date    Cancer     Hypertension        Past Surgical History:   Procedure Laterality Date    BACK SURGERY      DIRECT LARYNGOSCOPY N/A 08/05/2022    Procedure: LARYNGOSCOPY, DIRECT;   Surgeon: Mekhi Grossman MD;  Location: [facility] OR;  Service: ENT;  Laterality: N/A;    EXTRACTION OF TOOTH N/A 2022    Procedure: EXTRACTION, TEETH;  Surgeon: Mekhi Grossman MD;  Location: [facility] OR;  Service: ENT;  Laterality: N/A;    SPINE SURGERY      Not sure of date maybe in        Social History     Socioeconomic History    Marital status:    Tobacco Use    Smoking status: Former     Packs/day: 1.50     Years: 35.00     Pack years: 52.50     Types: Cigarettes     Start date:      Quit date: 2022     Years since quittin.3    Smokeless tobacco: Never    Tobacco comments:     Been almost 35 yr   Substance and Sexual Activity    Alcohol use: Not Currently    Drug use: Yes     Frequency: 3.0 times per week     Types: Marijuana     Comment: Daily    Sexual activity: Yes     Partners: Female     Birth control/protection: None       Family History   Problem Relation Age of Onset    Hypertension Mother     Cancer Father     Hypertension Sister     Hypertension Brother        Outpatient Encounter Medications as of 11/3/2022   Medication Sig Dispense Refill    amLODIPine (NORVASC) 2.5 MG tablet Take 1 tablet (2.5 mg total) by mouth once daily. 30 tablet 1    gabapentin (NEURONTIN) 100 MG capsule Take 1 capsule (100 mg total) by mouth 3 (three) times daily. 90 capsule 11    HYDROcodone-acetaminophen (NORCO)  mg per tablet Take 1 tablet by mouth every 6 (six) hours as needed.      ondansetron (ZOFRAN-ODT) 8 MG TbDL Take 1 tablet (8 mg total) by mouth every 8 (eight) hours as needed (nausea). 90 tablet 3    rivaroxaban (XARELTO) 20 mg Tab Start taking 1 tablet once daily by mouth with evening meal 30 tablet 1    HYDROcodone-acetaminophen (NORCO) 5-325 mg per tablet Take 1 tablet by mouth every 6 (six) hours as needed for Pain. (Patient not taking: Reported on 9/15/2022) 15 tablet 0    rivaroxaban (XARELTO) 15 mg Tab Take 1 tablet twice a day with evening meal for 21 days (Patient not  "taking: Reported on 11/3/2022) 42 tablet 0     Facility-Administered Encounter Medications as of 11/3/2022   Medication Dose Route Frequency Provider Last Rate Last Admin    lactated ringers infusion   Intravenous Continuous Elzbieta Quesada MD 10 mL/hr at 08/05/22 0808 New Bag at 08/05/22 0808    LIDOcaine (PF) 10 mg/ml (1%) injection 10 mg  1 mL Intradermal Once Yajaira Watts, FNP        sodium chloride 0.9% flush 10 mL  10 mL Intravenous PRN Anna Taylor MD           Physical Exam:  Vitals:    11/03/22 1412 11/03/22 1416   BP: (!) 148/82 (!) 142/82   BP Location: Right arm Right arm   Patient Position: Sitting Sitting   BP Method: Medium (Automatic) Medium (Automatic)   Pulse: 86    Resp: 16    Temp: 99.4 °F (37.4 °C)    TempSrc: Oral    Weight: 73.9 kg (163 lb)    Height: 5' 10" (1.778 m)        Constitutional  General Appearance: well nourished, well-developed, AAO x3, NAD; voice muffled/hot potato voice  HEENT  Eyes: PEERLA, EOMI, normal conjunctivae  Ears: Hearing well at conversation level;    AD: auricle normal,cerumen impaction   AS: auricle normal, cerumen impaction   Vestibular: ambulates without gait disturbance  Nose: septum midline, no inferior turbinate hypertrophy, no polyps, moist mucosa without erythema or blue hue  OC/OP: dentition poor, no oral lesions, tongue/FOM/BOT- soft, no leukoplakia/ulcerations/ tenderness, good extension  Nasopharynx, Hypopharynx, and Larynx:    Indirect: attempted, limited view due to patient intolerance  Neck: Left level III/IV 5-6cm lymph node, non tender, firm; right level II 3cm lymph node  mobile,   Postradiation hyperpigmentation to the anterior neck.  Neuro: CN II - XII intact bilaterally  Cardiovascular: peripheral pulses palpable  Respiratory: non-labored respirations  Psychiatric: oriented to time, place and person, no depression, anxiety or agitation    Flexible Fiberoptic Laryngoscopy/Nasopharyngoscopy via right nare  Anesthesia: none   Adverse " Events: None  Resident Surgeon: ANTONINO Sales MD  Blood loss: none  Condition: good    Procedure in Detail: Informed consent was obtained from the patient after explanation of procedure, indications, risks and benefits. Flexible endoscopy was performed on Yaron Brennan through the right nasal passages. The nasal cavity, nasopharynx, oropharynx, hypopharynx and larynx were adequately visualized. The true vocal cords and arytenoids were examined during phonation and repose.    Findings:  NP/OP: no masses/lesions of NC, eustachian tube, fossa of Rosenmuller, no adenoid hypertrophy  BOT/vallecula:   There is an eschar of the  leftbase of tongue going to the midline where prior malignancy noted.  There are postradiation changes to the mucosa.  Piriform sinuses/post-cricoid: no masses/lesions, no pooling of secretions  Epiglottis: lingual and laryngeal surfaces within normal limits  Arytenoids/FVFs: no masses/lesions, no edema, bilateral mobility  TVCs: bilateral cord mobility, no masses or lesions  No aspiration, no pooled secretions    Patient does have postradiation changes throughout the larynx.      Pertinent Data:  ? LABS:  ? AUDIO:  ? CT Scan:    Imaging:   I personally reviewed the following images:    Summary of Outside Records:      Assessment/Plan:  Yaron Brennan is a 57 y.o. 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 CRT, finished 10/10/22.   Currently doing well  following his CRT.    -   Discussed with patient that if he develops significant dysphagia or difficulty breathing that he should report to the emergency room and or call ENT.  Also discussed that if he develops any bleeding that he should present to the nearest emergency room as soon as possible    - Patient will return to clinic in 1 month for cancer surveillance and scope exam    ANTONINO Sales MD  [facility] Otolaryngology PGY III      HEAD & NECK  CANCER SURVEILLANCE   Radiation and/or Chemotherapy     Medical Oncologist: Kevon Welsh MD  Radiation Oncologist: Unknown    Primary tumor: cT3 cN2 M0 p16+      Date of Diagnosis: 7/6/22  Therapy: Cisplatin 100 mg/m² IV days 1, 22, and 23, concurrent with RT  Completion Date: 10/10/22    Pertinent Treatment History:   first presentation 7/2022  CT neck 7/5/22 & 7/29/22  Panendoscopy 7/6/22  Right UE DVT 2/2 PICC 9/15/22 on Xarelto  CRT 8/25/22 - 10/10/22      Place date if completed   3 6 12 18 24 36 48 60   CT Neck X X X X X X X X   PET/CT X          CXR  X X X X X X X   TFT X  X  X X X X     Current Surveillance Interval:  <1 year; every 4-6 weeks

## 2022-11-03 NOTE — PROGRESS NOTES
The scope used for the exam was:  Flexible scope ENF-P4  Serial Number:  1)    [id number]    []   2)    [id number]    []   3)    [id number]    []   4)    [id number]    [x]   5)    [id number]    []   6)    [id number]    []       The scope used for the exam was:  Rigid scope   Serial Number:  1)   [id number]    []   2)   [id number]    []   3)   7330    []   4)    3384   []   5)    0824   []   6)    5554   []     7)   7425    []   8)   2240    []   9)   1109    []

## 2022-11-07 ENCOUNTER — APPOINTMENT (OUTPATIENT)
Dept: HEMATOLOGY/ONCOLOGY | Facility: CLINIC | Age: 57
End: 2022-11-07
Payer: MEDICAID

## 2022-11-14 RX ORDER — AMLODIPINE BESYLATE 2.5 MG/1
1 TABLET ORAL DAILY
COMMUNITY
Start: 2022-07-08 | End: 2022-11-15 | Stop reason: DRUGHIGH

## 2022-11-14 RX ORDER — COVID-19 MOLECULAR TEST ASSAY
KIT MISCELLANEOUS
COMMUNITY
Start: 2022-10-05 | End: 2022-12-01 | Stop reason: ALTCHOICE

## 2022-11-14 RX ORDER — IBUPROFEN 200 MG
1 TABLET ORAL DAILY
Status: ON HOLD | COMMUNITY
Start: 2022-10-19 | End: 2023-10-02 | Stop reason: HOSPADM

## 2022-11-14 RX ORDER — HYDROCODONE BITARTRATE AND ACETAMINOPHEN 7.5; 325 MG/1; MG/1
1 TABLET ORAL EVERY 4 HOURS PRN
COMMUNITY
Start: 2022-09-27 | End: 2022-12-01 | Stop reason: DRUGHIGH

## 2022-11-14 RX ORDER — RIVAROXABAN 15 MG-20MG
KIT ORAL
COMMUNITY
Start: 2022-09-20 | End: 2022-11-15 | Stop reason: DRUGHIGH

## 2022-11-14 RX ORDER — MOMETASONE FUROATE 1 MG/G
OINTMENT TOPICAL 2 TIMES DAILY
Status: ON HOLD | COMMUNITY
Start: 2022-10-17 | End: 2023-10-02 | Stop reason: HOSPADM

## 2022-11-14 RX ORDER — SODIUM POLYSTYRENE SULFONATE 15 G/60ML
SUSPENSION ORAL; RECTAL
Status: ON HOLD | COMMUNITY
Start: 2022-10-04 | End: 2023-10-02 | Stop reason: HOSPADM

## 2022-11-15 ENCOUNTER — OFFICE VISIT (OUTPATIENT)
Dept: INTERNAL MEDICINE | Facility: CLINIC | Age: 57
End: 2022-11-15
Payer: MEDICAID

## 2022-11-15 VITALS
RESPIRATION RATE: 18 BRPM | SYSTOLIC BLOOD PRESSURE: 142 MMHG | HEART RATE: 83 BPM | DIASTOLIC BLOOD PRESSURE: 70 MMHG | HEIGHT: 70 IN | BODY MASS INDEX: 23.31 KG/M2 | TEMPERATURE: 99 F | WEIGHT: 162.81 LBS

## 2022-11-15 DIAGNOSIS — Z13.1 SCREENING FOR DIABETES MELLITUS: ICD-10-CM

## 2022-11-15 DIAGNOSIS — C01 SQUAMOUS CELL CARCINOMA OF BASE OF TONGUE: ICD-10-CM

## 2022-11-15 DIAGNOSIS — I82.A11 ACUTE DEEP VEIN THROMBOSIS (DVT) OF AXILLARY VEIN OF RIGHT UPPER EXTREMITY: ICD-10-CM

## 2022-11-15 DIAGNOSIS — Z78.9 ALCOHOL USE: Primary | ICD-10-CM

## 2022-11-15 DIAGNOSIS — Z72.0 TOBACCO ABUSE: ICD-10-CM

## 2022-11-15 DIAGNOSIS — Z12.5 SCREENING FOR MALIGNANT NEOPLASM OF PROSTATE: ICD-10-CM

## 2022-11-15 DIAGNOSIS — Z13.220 SCREENING CHOLESTEROL LEVEL: ICD-10-CM

## 2022-11-15 DIAGNOSIS — I10 PRIMARY HYPERTENSION: ICD-10-CM

## 2022-11-15 DIAGNOSIS — Z12.11 SCREENING FOR MALIGNANT NEOPLASM OF COLON: ICD-10-CM

## 2022-11-15 DIAGNOSIS — Z76.89 ENCOUNTER TO ESTABLISH CARE: ICD-10-CM

## 2022-11-15 PROCEDURE — 3078F PR MOST RECENT DIASTOLIC BLOOD PRESSURE < 80 MM HG: ICD-10-PCS | Mod: CPTII,,, | Performed by: NURSE PRACTITIONER

## 2022-11-15 PROCEDURE — 3077F SYST BP >= 140 MM HG: CPT | Mod: CPTII,,, | Performed by: NURSE PRACTITIONER

## 2022-11-15 PROCEDURE — 99214 OFFICE O/P EST MOD 30 MIN: CPT | Mod: 25,S$PBB,, | Performed by: NURSE PRACTITIONER

## 2022-11-15 PROCEDURE — 99215 OFFICE O/P EST HI 40 MIN: CPT | Mod: PBBFAC | Performed by: NURSE PRACTITIONER

## 2022-11-15 PROCEDURE — 3078F DIAST BP <80 MM HG: CPT | Mod: CPTII,,, | Performed by: NURSE PRACTITIONER

## 2022-11-15 PROCEDURE — 99406 PR TOBACCO USE CESSATION INTERMEDIATE 3-10 MINUTES: ICD-10-PCS | Mod: S$PBB,,, | Performed by: NURSE PRACTITIONER

## 2022-11-15 PROCEDURE — 1160F PR REVIEW ALL MEDS BY PRESCRIBER/CLIN PHARMACIST DOCUMENTED: ICD-10-PCS | Mod: CPTII,,, | Performed by: NURSE PRACTITIONER

## 2022-11-15 PROCEDURE — 1159F MED LIST DOCD IN RCRD: CPT | Mod: CPTII,,, | Performed by: NURSE PRACTITIONER

## 2022-11-15 PROCEDURE — 99406 BEHAV CHNG SMOKING 3-10 MIN: CPT | Mod: S$PBB,,, | Performed by: NURSE PRACTITIONER

## 2022-11-15 PROCEDURE — 3077F PR MOST RECENT SYSTOLIC BLOOD PRESSURE >= 140 MM HG: ICD-10-PCS | Mod: CPTII,,, | Performed by: NURSE PRACTITIONER

## 2022-11-15 PROCEDURE — 3008F BODY MASS INDEX DOCD: CPT | Mod: CPTII,,, | Performed by: NURSE PRACTITIONER

## 2022-11-15 PROCEDURE — 3008F PR BODY MASS INDEX (BMI) DOCUMENTED: ICD-10-PCS | Mod: CPTII,,, | Performed by: NURSE PRACTITIONER

## 2022-11-15 PROCEDURE — 99214 PR OFFICE/OUTPT VISIT, EST, LEVL IV, 30-39 MIN: ICD-10-PCS | Mod: 25,S$PBB,, | Performed by: NURSE PRACTITIONER

## 2022-11-15 PROCEDURE — 1159F PR MEDICATION LIST DOCUMENTED IN MEDICAL RECORD: ICD-10-PCS | Mod: CPTII,,, | Performed by: NURSE PRACTITIONER

## 2022-11-15 PROCEDURE — 1160F RVW MEDS BY RX/DR IN RCRD: CPT | Mod: CPTII,,, | Performed by: NURSE PRACTITIONER

## 2022-11-15 RX ORDER — AMLODIPINE BESYLATE 5 MG/1
5 TABLET ORAL DAILY
Qty: 90 TABLET | Refills: 1 | Status: SHIPPED | OUTPATIENT
Start: 2022-11-15 | End: 2022-12-01 | Stop reason: DRUGHIGH

## 2022-11-15 NOTE — PROGRESS NOTES
SANDY Brandt   [facility]  [address] [address]      PATIENT NAME: Yaron Brennan  : 1965  DATE: 11/15/22  MRN: [id number]      Billing Provider: SANDY Brandt  Level of Service: [place] OFFICE/OUTPT VISIT, EST, LEVL IV, 30-39 MIN  Patient PCP Information       Provider PCP Type    Primary Doctor No General            Reason for Visit / Chief Complaint: Follow-up and Initial visit       History of Present Illness / Problem Focused Workflow     Yaron Brennan is a 57 y.o. Black or  male presents to the clinic to establish PCP in [facility]. PMH squamous cell carcinoma of the base of the tongue (dx'd 2022), RUE DVT (/ PICC line 9/15/22), HTN, ETOH and tobacco abuse. Followed by [facility] oncology and ENT clinics. Wife, Blanca, present during visit today. Pt has completed chemo and radiation in 10/2022. Reports med compliance. Pt is tolerating po but reports feels pain on palate with eating. Admits ENT told him that may continue for a time d/t radiation. BP borderline today; does not follow dash diet. Pt quit smoking with dx in July; smoked 1 ppd/cigs, onset age 17 (42 yr hx) and used to drink 6-12 beers daily until that time. Pt endorses smokes marijuana routinely to help with pain and appetite. Endorses has lost approx 40 lbs over the past 6 months. Declines flu vaccine today. Pt is not fasting; will come and have labs drawn within the next 2 weeks. Denies chest pain, shortness of breath, cough, fever, headache, dizziness, weakness, abdominal pain, nausea, vomiting, diarrhea, constipation, dysuria, depression, anxiety, SI/HI.      Review of Systems     Review of Systems   Constitutional: Negative.    HENT:  Positive for mouth dryness and voice change.    Eyes: Negative.    Respiratory: Negative.     Cardiovascular: Negative.    Gastrointestinal: Negative.    Endocrine: Negative.    Genitourinary: Negative.     Musculoskeletal: Negative.    Integumentary:  Negative.   Neurological: Negative.    Psychiatric/Behavioral: Negative.        Medical / Social / Family History     Past Medical History:   Diagnosis Date    Cancer     Hypertension        Past Surgical History:   Procedure Laterality Date    BACK SURGERY      DIRECT LARYNGOSCOPY N/A 08/05/2022    Procedure: LARYNGOSCOPY, DIRECT;  Surgeon: Mekhi Grossman MD;  Location: [facility];  Service: ENT;  Laterality: N/A;    EXTRACTION OF TOOTH N/A 08/05/2022    Procedure: EXTRACTION, TEETH;  Surgeon: Mekhi Grossman MD;  Location: [facility];  Service: ENT;  Laterality: N/A;    SPINE SURGERY  2000    Not sure of date maybe in 2000       Social History  Mr. Brennan reports that he quit smoking about 4 months ago. His smoking use included cigarettes. He started smoking about 40 years ago. He has never used smokeless tobacco. He reports that he does not currently use alcohol. He reports current drug use. Frequency: 3.00 times per week. Drug: Marijuana.    Family History  's family history includes Cancer in his father; Hypertension in his brother, mother, and sister.    Medications and Allergies     Medications  Medication List with Changes/Refills   New Medications    AMLODIPINE (NORVASC) 5 MG TABLET    Take 1 tablet (5 mg total) by mouth once daily.   Current Medications    BINAXNOW COVID-19 AG SELF TEST KIT    TEST AS DIRECTED TODAY    GABAPENTIN (NEURONTIN) 100 MG CAPSULE    Take 1 capsule (100 mg total) by mouth 3 (three) times daily.    HYDROCODONE-ACETAMINOPHEN (NORCO) 7.5-325 MG PER TABLET    Take 1 tablet by mouth every 4 (four) hours as needed.    MOMETASONE (ELOCON) 0.1 % OINTMENT    Apply topically 2 (two) times daily.    NICOTINE (NICODERM CQ) 21 MG/24 HR    1 patch once daily.    ONDANSETRON (ZOFRAN-ODT) 8 MG TBDL    Take 1 tablet (8 mg total) by mouth every 8 (eight) hours as needed (nausea).    RIVAROXABAN (XARELTO) 20 MG TAB    Start taking 1 tablet  "once daily by mouth with evening meal    SPS, WITH SORBITOL, 15-20 GRAM/60 ML SUSP    Take by mouth.   Discontinued Medications    AMLODIPINE (NORVASC) 2.5 MG TABLET    Take 1 tablet (2.5 mg total) by mouth once daily.    AMLODIPINE (NORVASC) 2.5 MG TABLET    Take 1 tablet by mouth once daily.    HYDROCODONE-ACETAMINOPHEN (NORCO)  MG PER TABLET    Take 1 tablet by mouth every 6 (six) hours as needed.    HYDROCODONE-ACETAMINOPHEN (NORCO) 5-325 MG PER TABLET    Take 1 tablet by mouth every 6 (six) hours as needed for Pain.    RIVAROXABAN (XARELTO) 15 MG TAB    Take 1 tablet twice a day with evening meal for 21 days    XARELTO DVT-PE TREAT 30D START 15 MG (42)- 20 MG (9) TABLET DOSE PACK    AS DIRECTED         Allergies  Review of patient's allergies indicates:  No Known Allergies    Physical Examination   Vital Signs  Temp: 98.8 °F (37.1 °C)  Pulse: 83  Resp: 18  BP: (!) 142/70  BP Location: Right arm  Patient Position: Sitting  Pain Score: 0-No pain  Height and Weight  Height: 5' 10" (177.8 cm)  Weight: 73.8 kg (162 lb 12.8 oz)  BSA (Calculated - sq m): 1.91 sq meters  BMI (Calculated): 23.4  Weight in (lb) to have BMI = 25: 173.9]   Physical Exam  Vitals reviewed.   Constitutional:       Appearance: Normal appearance.   HENT:      Head: Normocephalic.   Eyes:      Pupils: Pupils are equal, round, and reactive to light.   Cardiovascular:      Rate and Rhythm: Normal rate and regular rhythm.      Pulses: Normal pulses.      Heart sounds: Normal heart sounds.   Pulmonary:      Effort: Pulmonary effort is normal.      Breath sounds: Normal breath sounds.   Abdominal:      General: Bowel sounds are normal.      Palpations: Abdomen is soft.   Musculoskeletal:         General: Normal range of motion.   Skin:     General: Skin is warm.   Neurological:      Mental Status: He is alert and oriented to person, place, and time.   Psychiatric:         Mood and Affect: Mood normal.         Results     Lab Results   Component " Value Date    WBC 4.2 (L) 11/07/2022    RBC 2.32 (L) 11/07/2022    HGB 7.4 (L) 11/07/2022    HCT 23.3 (L) 11/07/2022    .4 (H) 11/07/2022    MCH 31.9 (H) 11/07/2022    MCHC 31.8 (L) 11/07/2022    RDW 19.1 (H) 11/07/2022     11/07/2022    MPV 8.5 11/07/2022     CMP  Sodium Level   Date Value Ref Range Status   11/07/2022 138 136 - 145 mmol/L Final     Potassium Level   Date Value Ref Range Status   11/07/2022 4.0 3.5 - 5.1 mmol/L Final     Carbon Dioxide   Date Value Ref Range Status   11/07/2022 26 22 - 29 mmol/L Final     Blood Urea Nitrogen   Date Value Ref Range Status   11/07/2022 15.9 8.4 - 25.7 mg/dL Final     Creatinine   Date Value Ref Range Status   11/07/2022 0.87 0.73 - 1.18 mg/dL Final     Calcium Level Total   Date Value Ref Range Status   11/07/2022 8.6 8.4 - 10.2 mg/dL Final     Albumin Level   Date Value Ref Range Status   11/07/2022 3.4 (L) 3.5 - 5.0 gm/dL Final     Bilirubin Total   Date Value Ref Range Status   11/07/2022 0.3 <=1.5 mg/dL Final     Alkaline Phosphatase   Date Value Ref Range Status   11/07/2022 60 40 - 150 unit/L Final     Aspartate Aminotransferase   Date Value Ref Range Status   11/07/2022 13 5 - 34 unit/L Final     Alanine Aminotransferase   Date Value Ref Range Status   11/07/2022 13 0 - 55 unit/L Final     Estimated GFR-Non    Date Value Ref Range Status   07/18/2022 >60 mls/min/1.73/m2 Final       Assessment and Plan (including Health Maintenance)     Plan: RTC 12/1/2022 and prn. Labs one week prior to appt.         Health Maintenance Due   Topic Date Due    Hepatitis C Screening  Never done    Lipid Panel  Never done    COVID-19 Vaccine (1) Never done    Pneumococcal Vaccines (Age 0-64) (1 - PCV) Never done    HIV Screening  Never done    TETANUS VACCINE  Never done    Shingles Vaccine (1 of 2) Never done    Colorectal Cancer Screening  Never done    Influenza Vaccine (1) Never done       Problem List Items Addressed This Visit           Psychiatric    Alcohol use - Primary    Current Assessment & Plan     Alcohol cessation discussed.  Pt quit in July.  Encouraged and congratulated on continued cessation.   Declines information for detox programs/resources.  Discussed benefits of quitting including improved health, decreased cardiac/vascular/pulmonary/stroke risks as well as saving money.               Hematology    Acute deep vein thrombosis (DVT) of axillary vein of right upper extremity    Current Assessment & Plan     Continue xarelto.  ED precautions discussed.  Understanding voiced.            Oncology    Squamous cell carcinoma of base of tongue       Other    Tobacco abuse    Current Assessment & Plan     Smoking cessation discussed, total time 4 mins.   Pt quit smoking cigarettes in July.  Encouraged and congratulated on continued cessation.   Discussed benefits of quitting including improved health, decreased cardiac/vascular/pulmonary/stroke risks as well as saving money.           Encounter to establish care    Current Assessment & Plan     Annual wellness labs ordered.           Other Visit Diagnoses       Screening for diabetes mellitus        Relevant Orders    Hemoglobin A1C    Screening cholesterol level        Relevant Orders    Lipid Panel    Screening for malignant neoplasm of colon        Relevant Orders    Cologuard Screening (Multitarget Stool DNA)    Screening for malignant neoplasm of prostate        Relevant Orders    PSA, Screening    Primary hypertension        Relevant Orders    T4, Free    TSH    Urinalysis, Reflex to Urine Culture Urine, Clean Catch    Microalbumin/Creatinine Ratio, Urine            The patient has no Health Maintenance topics of status Not Due    Future Appointments   Date Time Provider Department Center   12/1/2022  8:30 AM SANDY Bar [facility] ENEDINA [place]   12/1/2022  1:00 PM NURSE, [facility] HEMATOLOGY ONCOLOGY [facility] Em[facility]   12/1/2022  2:00 PM Jigna Kearney [facility]  [place]   12/7/2022  8:00 AM RESIDENT 1, [facility] OTORHINOLARYNGOLOGY [facility] ENT Em[place]            Signature:  SANDY Brandt  [facility]  [address] [address]    Date of encounter: 11/15/22

## 2022-11-15 NOTE — ASSESSMENT & PLAN NOTE
Smoking cessation discussed, total time 4 mins.   Pt quit smoking cigarettes in July.  Encouraged and congratulated on continued cessation.   Discussed benefits of quitting including improved health, decreased cardiac/vascular/pulmonary/stroke risks as well as saving money.

## 2022-11-15 NOTE — ASSESSMENT & PLAN NOTE
Alcohol cessation discussed.  Pt quit in July.  Encouraged and congratulated on continued cessation.   Declines information for detox programs/resources.  Discussed benefits of quitting including improved health, decreased cardiac/vascular/pulmonary/stroke risks as well as saving money.

## 2022-11-22 ENCOUNTER — LAB VISIT (OUTPATIENT)
Dept: LAB | Facility: HOSPITAL | Age: 57
End: 2022-11-22
Attending: NURSE PRACTITIONER
Payer: MEDICAID

## 2022-11-22 DIAGNOSIS — Z13.220 SCREENING CHOLESTEROL LEVEL: ICD-10-CM

## 2022-11-22 DIAGNOSIS — Z13.1 SCREENING FOR DIABETES MELLITUS: ICD-10-CM

## 2022-11-22 DIAGNOSIS — Z12.5 SCREENING FOR MALIGNANT NEOPLASM OF PROSTATE: ICD-10-CM

## 2022-11-22 DIAGNOSIS — I10 PRIMARY HYPERTENSION: ICD-10-CM

## 2022-11-22 LAB
APPEARANCE UR: CLEAR
BACTERIA #/AREA URNS AUTO: ABNORMAL /HPF
BILIRUB UR QL STRIP.AUTO: NEGATIVE MG/DL
CHOLEST SERPL-MCNC: 173 MG/DL
CHOLEST/HDLC SERPL: 3 {RATIO} (ref 0–5)
COLOR UR AUTO: COLORLESS
CREAT UR-MCNC: 25.9 MG/DL (ref 63–166)
EST. AVERAGE GLUCOSE BLD GHB EST-MCNC: 91.1 MG/DL
GLUCOSE UR QL STRIP.AUTO: NORMAL MG/DL
HBA1C MFR BLD: 4.8 %
HDLC SERPL-MCNC: 60 MG/DL (ref 35–60)
HYALINE CASTS #/AREA URNS LPF: ABNORMAL /LPF
KETONES UR QL STRIP.AUTO: NEGATIVE MG/DL
LDLC SERPL CALC-MCNC: 103 MG/DL (ref 50–140)
LEUKOCYTE ESTERASE UR QL STRIP.AUTO: NEGATIVE UNIT/L
MICROALBUMIN UR-MCNC: <5 UG/ML
MICROALBUMIN/CREAT RATIO PNL UR: <19.3 MG/GM CR (ref 0–30)
MUCOUS THREADS URNS QL MICRO: ABNORMAL /LPF
NITRITE UR QL STRIP.AUTO: NEGATIVE
PH UR STRIP.AUTO: 6.5 [PH]
PROT UR QL STRIP.AUTO: NEGATIVE MG/DL
PSA SERPL-MCNC: 1.29 NG/ML
RBC #/AREA URNS AUTO: ABNORMAL /HPF
RBC UR QL AUTO: NEGATIVE UNIT/L
SP GR UR STRIP.AUTO: 1.01
SQUAMOUS #/AREA URNS LPF: ABNORMAL /HPF
T4 FREE SERPL-MCNC: 0.87 NG/DL (ref 0.7–1.48)
TRIGL SERPL-MCNC: 52 MG/DL (ref 34–140)
TSH SERPL-ACNC: 1.76 UIU/ML (ref 0.35–4.94)
UROBILINOGEN UR STRIP-ACNC: NORMAL MG/DL
VLDLC SERPL CALC-MCNC: 10 MG/DL
WBC #/AREA URNS AUTO: ABNORMAL /HPF

## 2022-11-22 PROCEDURE — 80061 LIPID PANEL: CPT

## 2022-11-22 PROCEDURE — 84153 ASSAY OF PSA TOTAL: CPT

## 2022-11-22 PROCEDURE — 84443 ASSAY THYROID STIM HORMONE: CPT

## 2022-11-22 PROCEDURE — 84439 ASSAY OF FREE THYROXINE: CPT

## 2022-11-22 PROCEDURE — 81001 URINALYSIS AUTO W/SCOPE: CPT

## 2022-11-22 PROCEDURE — 36415 COLL VENOUS BLD VENIPUNCTURE: CPT

## 2022-11-22 PROCEDURE — 83036 HEMOGLOBIN GLYCOSYLATED A1C: CPT

## 2022-11-22 PROCEDURE — 82043 UR ALBUMIN QUANTITATIVE: CPT

## 2022-11-27 LAB — NONINV COLON CA DNA+OCC BLD SCRN STL QL: POSITIVE

## 2022-11-30 DIAGNOSIS — C01 SQUAMOUS CELL CARCINOMA OF BASE OF TONGUE: Primary | ICD-10-CM

## 2022-12-01 ENCOUNTER — OFFICE VISIT (OUTPATIENT)
Dept: INTERNAL MEDICINE | Facility: CLINIC | Age: 57
End: 2022-12-01
Payer: MEDICAID

## 2022-12-01 ENCOUNTER — OFFICE VISIT (OUTPATIENT)
Dept: HEMATOLOGY/ONCOLOGY | Facility: CLINIC | Age: 57
End: 2022-12-01
Payer: MEDICAID

## 2022-12-01 VITALS
DIASTOLIC BLOOD PRESSURE: 87 MMHG | TEMPERATURE: 98 F | RESPIRATION RATE: 20 BRPM | WEIGHT: 167 LBS | BODY MASS INDEX: 23.91 KG/M2 | OXYGEN SATURATION: 98 % | HEART RATE: 83 BPM | HEIGHT: 70 IN | SYSTOLIC BLOOD PRESSURE: 148 MMHG

## 2022-12-01 VITALS
WEIGHT: 164 LBS | HEART RATE: 73 BPM | TEMPERATURE: 99 F | DIASTOLIC BLOOD PRESSURE: 76 MMHG | BODY MASS INDEX: 23.48 KG/M2 | SYSTOLIC BLOOD PRESSURE: 138 MMHG | HEIGHT: 70 IN | RESPIRATION RATE: 20 BRPM

## 2022-12-01 DIAGNOSIS — Z00.00 WELLNESS EXAMINATION: Primary | ICD-10-CM

## 2022-12-01 DIAGNOSIS — R19.5 POSITIVE COLORECTAL CANCER SCREENING USING COLOGUARD TEST: ICD-10-CM

## 2022-12-01 DIAGNOSIS — I10 PRIMARY HYPERTENSION: ICD-10-CM

## 2022-12-01 DIAGNOSIS — K64.9 HEMORRHOIDS, UNSPECIFIED HEMORRHOID TYPE: ICD-10-CM

## 2022-12-01 DIAGNOSIS — I82.621 ACUTE DEEP VEIN THROMBOSIS (DVT) OF OTHER VEIN OF RIGHT UPPER EXTREMITY: ICD-10-CM

## 2022-12-01 DIAGNOSIS — Z85.89 ENCOUNTER FOR FOLLOW-UP SURVEILLANCE OF HEAD AND NECK CANCER: ICD-10-CM

## 2022-12-01 DIAGNOSIS — Z23 NEED FOR VACCINATION: ICD-10-CM

## 2022-12-01 DIAGNOSIS — C01 SQUAMOUS CELL CARCINOMA OF BASE OF TONGUE: Primary | ICD-10-CM

## 2022-12-01 DIAGNOSIS — Z08 ENCOUNTER FOR FOLLOW-UP SURVEILLANCE OF HEAD AND NECK CANCER: ICD-10-CM

## 2022-12-01 PROCEDURE — 3079F DIAST BP 80-89 MM HG: CPT | Mod: CPTII,,, | Performed by: NURSE PRACTITIONER

## 2022-12-01 PROCEDURE — 99215 OFFICE O/P EST HI 40 MIN: CPT | Mod: PBBFAC | Performed by: NURSE PRACTITIONER

## 2022-12-01 PROCEDURE — 3075F SYST BP GE 130 - 139MM HG: CPT | Mod: CPTII,,, | Performed by: NURSE PRACTITIONER

## 2022-12-01 PROCEDURE — 1159F PR MEDICATION LIST DOCUMENTED IN MEDICAL RECORD: ICD-10-PCS | Mod: CPTII,,, | Performed by: NURSE PRACTITIONER

## 2022-12-01 PROCEDURE — 99396 PR PREVENTIVE VISIT,EST,40-64: ICD-10-PCS | Mod: S$PBB,25,, | Performed by: NURSE PRACTITIONER

## 2022-12-01 PROCEDURE — 99396 PREV VISIT EST AGE 40-64: CPT | Mod: S$PBB,25,, | Performed by: NURSE PRACTITIONER

## 2022-12-01 PROCEDURE — 3078F DIAST BP <80 MM HG: CPT | Mod: CPTII,,, | Performed by: NURSE PRACTITIONER

## 2022-12-01 PROCEDURE — 3066F PR DOCUMENTATION OF TREATMENT FOR NEPHROPATHY: ICD-10-PCS | Mod: CPTII,,, | Performed by: NURSE PRACTITIONER

## 2022-12-01 PROCEDURE — 3075F PR MOST RECENT SYSTOLIC BLOOD PRESS GE 130-139MM HG: ICD-10-PCS | Mod: CPTII,,, | Performed by: NURSE PRACTITIONER

## 2022-12-01 PROCEDURE — 99213 OFFICE O/P EST LOW 20 MIN: CPT | Mod: S$PBB,,, | Performed by: NURSE PRACTITIONER

## 2022-12-01 PROCEDURE — 3008F PR BODY MASS INDEX (BMI) DOCUMENTED: ICD-10-PCS | Mod: CPTII,,, | Performed by: NURSE PRACTITIONER

## 2022-12-01 PROCEDURE — 3077F SYST BP >= 140 MM HG: CPT | Mod: CPTII,,, | Performed by: NURSE PRACTITIONER

## 2022-12-01 PROCEDURE — 3061F PR NEG MICROALBUMINURIA RESULT DOCUMENTED/REVIEW: ICD-10-PCS | Mod: CPTII,,, | Performed by: NURSE PRACTITIONER

## 2022-12-01 PROCEDURE — 99212 OFFICE O/P EST SF 10 MIN: CPT | Mod: 25,S$PBB,, | Performed by: NURSE PRACTITIONER

## 2022-12-01 PROCEDURE — 90471 IMMUNIZATION ADMIN: CPT | Mod: PBBFAC

## 2022-12-01 PROCEDURE — 3008F BODY MASS INDEX DOCD: CPT | Mod: CPTII,,, | Performed by: NURSE PRACTITIONER

## 2022-12-01 PROCEDURE — 3079F PR MOST RECENT DIASTOLIC BLOOD PRESSURE 80-89 MM HG: ICD-10-PCS | Mod: CPTII,,, | Performed by: NURSE PRACTITIONER

## 2022-12-01 PROCEDURE — 3061F NEG MICROALBUMINURIA REV: CPT | Mod: CPTII,,, | Performed by: NURSE PRACTITIONER

## 2022-12-01 PROCEDURE — 3077F PR MOST RECENT SYSTOLIC BLOOD PRESSURE >= 140 MM HG: ICD-10-PCS | Mod: CPTII,,, | Performed by: NURSE PRACTITIONER

## 2022-12-01 PROCEDURE — 3066F NEPHROPATHY DOC TX: CPT | Mod: CPTII,,, | Performed by: NURSE PRACTITIONER

## 2022-12-01 PROCEDURE — 99213 PR OFFICE/OUTPT VISIT, EST, LEVL III, 20-29 MIN: ICD-10-PCS | Mod: S$PBB,,, | Performed by: NURSE PRACTITIONER

## 2022-12-01 PROCEDURE — 1159F MED LIST DOCD IN RCRD: CPT | Mod: CPTII,,, | Performed by: NURSE PRACTITIONER

## 2022-12-01 PROCEDURE — 99212 PR OFFICE/OUTPT VISIT, EST, LEVL II, 10-19 MIN: ICD-10-PCS | Mod: 25,S$PBB,, | Performed by: NURSE PRACTITIONER

## 2022-12-01 PROCEDURE — 3078F PR MOST RECENT DIASTOLIC BLOOD PRESSURE < 80 MM HG: ICD-10-PCS | Mod: CPTII,,, | Performed by: NURSE PRACTITIONER

## 2022-12-01 PROCEDURE — 99214 OFFICE O/P EST MOD 30 MIN: CPT | Mod: PBBFAC,27 | Performed by: NURSE PRACTITIONER

## 2022-12-01 RX ORDER — LIDOCAINE HYDROCHLORIDE 20 MG/ML
SOLUTION OROPHARYNGEAL
Qty: 100 ML | Refills: 0 | Status: ON HOLD | OUTPATIENT
Start: 2022-12-01 | End: 2023-10-02 | Stop reason: HOSPADM

## 2022-12-01 RX ORDER — HYDROCORTISONE 25 MG/G
CREAM TOPICAL 2 TIMES DAILY PRN
Qty: 28 G | Refills: 2 | Status: SHIPPED | OUTPATIENT
Start: 2022-12-01 | End: 2022-12-01 | Stop reason: CLARIF

## 2022-12-01 RX ORDER — AMLODIPINE BESYLATE 10 MG/1
10 TABLET ORAL DAILY
Qty: 90 TABLET | Refills: 1 | Status: SHIPPED | OUTPATIENT
Start: 2022-12-01 | End: 2023-04-13 | Stop reason: SDUPTHER

## 2022-12-01 RX ORDER — GABAPENTIN 300 MG/1
300 CAPSULE ORAL 3 TIMES DAILY
COMMUNITY
Start: 2022-11-23 | End: 2023-05-18 | Stop reason: ALTCHOICE

## 2022-12-01 RX ORDER — HYDROCODONE BITARTRATE AND ACETAMINOPHEN 5; 325 MG/1; MG/1
1 TABLET ORAL EVERY 6 HOURS PRN
COMMUNITY
Start: 2022-11-30 | End: 2022-12-01 | Stop reason: DRUGHIGH

## 2022-12-01 RX ORDER — HYDROCODONE BITARTRATE AND ACETAMINOPHEN 5; 325 MG/1; MG/1
1 TABLET ORAL EVERY 6 HOURS PRN
COMMUNITY
End: 2023-05-18 | Stop reason: ALTCHOICE

## 2022-12-01 NOTE — ASSESSMENT & PLAN NOTE
BP remains borderline today but improved.  Increase amlodipine to 10 mg daily.  Encouraged to contact clinic if readings begin to elevate or consistently >140/90.  Understanding voiced per pt and wife.  Follow a low sodium (less than 2 grams of sodium per day), DASH diet.   Continue medications as prescribed.  Monitor blood pressure and report any consistent values greater than 140/90 and keep a log.  Encouraged continued smoking cessation to aid in BP reduction and co-morbidities.   Maintain healthy weight with a BMI goal of <30.   Aerobic exercise for 150 minutes per week (or 5 days a week for 30 minutes each day).

## 2022-12-01 NOTE — ASSESSMENT & PLAN NOTE
Avoid spicy foods.  Hydrocortisone suppositories 25 mg at bedtime x 7 days.  Sitz baths during flares.  Take miralax and stool softener daily.  Educated on high fiber diet and exercise.  Drink at least 64 ozs of water daily.  ED precautions advised.

## 2022-12-01 NOTE — PROGRESS NOTES
SANDY Brandt   [facility]  [address] [facility] [address]      PATIENT NAME: Yaron Brennan  : 1965  DATE: 22  MRN: [id number]      Billing Provider: SANDY Brandt  Level of Service: [place] PREVENTIVE VISIT,EST,40-64  Patient PCP Information       Provider PCP Type    SANDY Brandt General            Reason for Visit / Chief Complaint: Follow-up (Lab results, refused vaccines)       History of Present Illness / Problem Focused Workflow     Yaron Brennan is a 57 y.o. Black or  male presents to the clinic for HTN and lab results. 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 Followed by [facility] oncology and ENT clinics. Wife, Blanca, present during visit today. Labs and cologuard discussed with pt. BP remains borderline today. Is attempting low sodium diet. Amendable to flu vaccine today. Lab and cologuard results discussed with pt and wife. Pt does admit to having hemorrhoids for several years with intermittent bright red blood at times. Sits in cold water or applies cool towel with some improvement when flared. Continues to report oral/throat pain with eating/swallowing which is improved with pain med and mouth rinse. Denies chest pain, shortness of breath, cough, fever, headache, dizziness, weakness, abdominal pain, nausea, vomiting, diarrhea, constipation, dysuria, depression, anxiety, SI/HI.    Review of Systems     Review of Systems   Constitutional: Negative.    HENT: Negative.     Eyes: Negative.    Respiratory: Negative.     Cardiovascular: Negative.    Gastrointestinal:  Positive for blood in stool.   Endocrine: Negative.    Genitourinary: Negative.    Musculoskeletal: Negative.    Integumentary:  Negative.   Neurological: Negative.    Psychiatric/Behavioral: Negative.        Medical / Social / Family  History     Past Medical History:   Diagnosis Date    Cancer     Hypertension        Past Surgical History:   Procedure Laterality Date    BACK SURGERY      DIRECT LARYNGOSCOPY N/A 08/05/2022    Procedure: LARYNGOSCOPY, DIRECT;  Surgeon: Mekhi Grossman MD;  Location: [facility] OR;  Service: ENT;  Laterality: N/A;    EXTRACTION OF TOOTH N/A 08/05/2022    Procedure: EXTRACTION, TEETH;  Surgeon: Mekhi Grossman MD;  Location: [facility] OR;  Service: ENT;  Laterality: N/A;    SPINE SURGERY  2000    Not sure of date maybe in 2000       Social History  Mr. Brennan reports that he quit smoking about 4 months ago. His smoking use included cigarettes. He started smoking about 40 years ago. He has never used smokeless tobacco. He reports that he does not currently use alcohol. He reports current drug use. Drug: Marijuana.    Family History  's family history includes Cancer in his father; Hypertension in his brother, mother, and sister.    Medications and Allergies     Medications  Medication List with Changes/Refills   New Medications    AMLODIPINE (NORVASC) 10 MG TABLET    Take 1 tablet (10 mg total) by mouth once daily.    HYDROCORTISONE (PROCTOCORT) 10 % (80 MG) RECTAL FOAM    Place 1 applicator rectally 2 (two) times daily as needed (hemorrhoidal flare).   Current Medications    GABAPENTIN (NEURONTIN) 300 MG CAPSULE    Take 300 mg by mouth 3 (three) times daily.    HYDROCODONE-ACETAMINOPHEN (NORCO) 5-325 MG PER TABLET    Take 1 tablet by mouth every 6 (six) hours as needed.    MOMETASONE (ELOCON) 0.1 % OINTMENT    Apply topically 2 (two) times daily.    NICOTINE (NICODERM CQ) 21 MG/24 HR    1 patch once daily.    ONDANSETRON (ZOFRAN-ODT) 8 MG TBDL    Take 1 tablet (8 mg total) by mouth every 8 (eight) hours as needed (nausea).    RIVAROXABAN (XARELTO) 20 MG TAB    Start taking 1 tablet once daily by mouth with evening meal    SPS, WITH SORBITOL, 15-20 GRAM/60 ML SUSP    Take by mouth.   Discontinued Medications     "AMLODIPINE (NORVASC) 5 MG TABLET    Take 1 tablet (5 mg total) by mouth once daily.    BINAXNOW COVID-19 AG SELF TEST KIT    TEST AS DIRECTED TODAY    GABAPENTIN (NEURONTIN) 100 MG CAPSULE    Take 1 capsule (100 mg total) by mouth 3 (three) times daily.    HYDROCODONE-ACETAMINOPHEN (NORCO) 5-325 MG PER TABLET    Take 1 tablet by mouth every 6 (six) hours as needed.    HYDROCODONE-ACETAMINOPHEN (NORCO) 7.5-325 MG PER TABLET    Take 1 tablet by mouth every 4 (four) hours as needed.         Allergies  Review of patient's allergies indicates:  No Known Allergies    Physical Examination   Vital Signs  Temp: 98.8 °F (37.1 °C)  Temp src: Oral  Pulse: 73  Resp: 20  BP: 138/76  BP Location: Left arm  Patient Position: Sitting  Pain Score:   6  Pain Loc: Mouth  Height and Weight  Height: 5' 10" (177.8 cm)  Weight: 74.4 kg (164 lb 0.4 oz)  BSA (Calculated - sq m): 1.92 sq meters  BMI (Calculated): 23.5  Weight in (lb) to have BMI = 25: 173.9]   Physical Exam      Results     Lab Results   Component Value Date    WBC 4.8 12/01/2022    RBC 2.82 (L) 12/01/2022    HGB 9.3 (L) 12/01/2022    HCT 27.8 (L) 12/01/2022    MCV 98.6 (H) 12/01/2022    MCH 33.0 (H) 12/01/2022    MCHC 33.5 12/01/2022    RDW 16.6 12/01/2022     12/01/2022    MPV 8.1 12/01/2022     CMP  Sodium Level   Date Value Ref Range Status   12/01/2022 138 136 - 145 mmol/L Final     Potassium Level   Date Value Ref Range Status   12/01/2022 4.4 3.5 - 5.1 mmol/L Final     Carbon Dioxide   Date Value Ref Range Status   12/01/2022 27 22 - 29 mmol/L Final     Blood Urea Nitrogen   Date Value Ref Range Status   12/01/2022 12.1 8.4 - 25.7 mg/dL Final     Creatinine   Date Value Ref Range Status   12/01/2022 0.81 0.73 - 1.18 mg/dL Final     Calcium Level Total   Date Value Ref Range Status   12/01/2022 9.3 8.4 - 10.2 mg/dL Final     Albumin Level   Date Value Ref Range Status   12/01/2022 4.1 3.5 - 5.0 gm/dL Final     Bilirubin Total   Date Value Ref Range Status "   12/01/2022 0.4 <=1.5 mg/dL Final     Alkaline Phosphatase   Date Value Ref Range Status   12/01/2022 53 40 - 150 unit/L Final     Aspartate Aminotransferase   Date Value Ref Range Status   12/01/2022 17 5 - 34 unit/L Final     Alanine Aminotransferase   Date Value Ref Range Status   12/01/2022 15 0 - 55 unit/L Final     Estimated GFR-Non    Date Value Ref Range Status   07/18/2022 >60 mls/min/1.73/m2 Final     Lab Results   Component Value Date    CHOL 173 11/22/2022     Lab Results   Component Value Date    HDL 60 11/22/2022     No results found for: LDLCALC  Lab Results   Component Value Date    TRIG 52 11/22/2022     No results found for: CHOLHDL  Lab Results   Component Value Date    TSH 1.7624 11/22/2022     Lab Results   Component Value Date    PROTEINUA Negative 11/22/2022    LEUKOCYTESUR Negative 11/22/2022      Latest Reference Range & Units 11/22/22 07:07   Hemoglobin A1C External <=7.0 % 4.8   Estimated Avg Glucose mg/dL 91.1   Free T4 0.70 - 1.48 ng/dL 0.87   PSA, Screen <=4.00 ng/mL 1.29      Latest Reference Range & Units 11/22/22 07:07   Hemoglobin A1C External <=7.0 % 4.8   Estimated Avg Glucose mg/dL 91.1   Free T4 0.70 - 1.48 ng/dL 0.87   PSA, Screen <=4.00 ng/mL 1.29   Color, UA Yellow, Light-Yellow, Dark Yellow, Yasemin, Straw  Colorless !   Appearance, UA Clear  Clear   Specific Gravity,UA  1.007   pH, UA 5.0 - 8.5  6.5   Protein, UA Negative mg/dL Negative   Glucose, UA Negative, Normal mg/dL Normal   Ketones, UA Negative mg/dL Negative   Occult Blood UA Negative unit/L Negative   NITRITE UA Negative  Negative   Bilirubin, UA Negative mg/dL Negative   Urobilinogen, UA 0.2, 1.0, Normal mg/dL Normal   Leukocytes, UA Negative unit/L Negative   RBC, UA None Seen, 0-2, 3-5, 0-5 /HPF 0-5   WBC, UA None Seen, 0-2, 3-5, 0-5 /HPF 0-5   Bacteria, UA None Seen /HPF None Seen   Squamous Epithelial Cells, UA None Seen /HPF None Seen   Hyaline Casts, UA None Seen /lpf None Seen   Mucous, UA  None Seen /LPF Trace !   Creatinine, Urine 63.0 - 166.0 mg/dL 25.9 (L)   Microalbumin, Urine <=30.0 ug/ml <5.0   MICROALB/CREAT RATIO 0.0 - 30.0 mg/gm Cr <19.3   !: Data is abnormal  (L): Data is abnormally low     Latest Reference Range & Units 11/21/22 08:30   Cologuard Result Negative  Positive !   !: Data is abnormal    Assessment and Plan (including Health Maintenance)     Plan: RTC 6 months and prn.         Health Maintenance Due   Topic Date Due    Hepatitis C Screening  Never done    COVID-19 Vaccine (1) Never done    Pneumococcal Vaccines (Age 0-64) (1 - PCV) Never done    HIV Screening  Never done    TETANUS VACCINE  Never done    Shingles Vaccine (1 of 2) Never done       Problem List Items Addressed This Visit          Cardiac/Vascular    Primary hypertension    Current Assessment & Plan     BP remains borderline today but improved.  Increase amlodipine to 10 mg daily.  Encouraged to contact clinic if readings begin to elevate or consistently >140/90.  Understanding voiced per pt and wife.  Follow a low sodium (less than 2 grams of sodium per day), DASH diet.   Continue medications as prescribed.  Monitor blood pressure and report any consistent values greater than 140/90 and keep a log.  Encouraged continued smoking cessation to aid in BP reduction and co-morbidities.   Maintain healthy weight with a BMI goal of <30.   Aerobic exercise for 150 minutes per week (or 5 days a week for 30 minutes each day).              ID    Need for vaccination    Current Assessment & Plan     Flu vaccine given.         Relevant Orders    Influenza - Quadrivalent *Preferred* (6 months+) (PF) (Completed)       Oncology    Positive colorectal cancer screening using Cologuard test    Current Assessment & Plan     Referral to Dr. Pereira for colonoscopy.  Keep appt when scheduled.  ED precautions.         Relevant Orders    Ambulatory referral/consult to Gastroenterology       GI    Hemorrhoids    Current Assessment & Plan      Avoid spicy foods.  Hydrocortisone suppositories 25 mg at bedtime x 7 days.  Sitz baths during flares.  Take miralax and stool softener daily.  Educated on high fiber diet and exercise.  Drink at least 64 ozs of water daily.  ED precautions advised.              Other    Wellness examination       Health Maintenance Topics with due status: Not Due       Topic Last Completion Date    Colorectal Cancer Screening 11/21/2022    Lipid Panel 11/22/2022       Future Appointments   Date Time Provider Department Center   12/1/2022  2:00 PM Jigna Kearney NP [facility] HEMOMC [facility]   12/7/2022  8:00 AM RESIDENT 1, [facility] OTORHINOLARYNGOLOGY [facility] ENT [facility]   6/1/2023  9:30 AM SANDY Bar [facility] INTMED [facility]            Signature:  SANDY Brandt  [facility]  [address] [facility] [address]    Date of encounter: 12/1/22

## 2022-12-01 NOTE — PROGRESS NOTES
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 Intermittent alcohol use.  Procedure/surgical history: Back surgery (20-30 years ago, in use 10, apparently involving L5 fusion for prolapse this from work-related injury).  Social history:  Has been smoking a pack of cigarettes daily since age 17 years.  Has been drinking 6 beers daily for last 40 years.  Lately, trying to quit smoking and drinking.  Smokes marijuana.  .  Has 2 biological children of his own.  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  Lives in [place], [place]..  Family history:  Father  from some kind of cancer at age 72 years.  Health maintenance:  Does not have a regular primary care provider.  Does not visit with doctors.  No screening colonoscopy ever.     History of Present illness:  57-year-old gentleman     He was admitted to [facility] 2022 with 2 months history of progressive dysphagia, odynophagia, soreness of tongue, 20 lb weight loss and bilateral neck masses.  Also, hoarseness.  CT neck showed ulcerated mass in the tongue base, malignant-appearing, along with lymph node metastasis.  ENT performed a laryngoscopy with biopsy of tongue mass.  Friable base of tongue mass.  No airway compromise.  Right upper lung lobe indistinct, ground-glass lung nodules, typical of a benign inflammatory etiology.  Discharged 2022.  Patient reported weight loss of> 20 lb in previous 2 months.    Interval History   Clinic follow up today, 22; accompanied by his spouse. Completed concurrent chemoradiation a couple of months ago; continues to have mouth burning/discomfort/irritation. Currently using Atomic Tonic. Otherwise no significant complaints or concerns. Endorses  eating well. Stable energy level and appetite. Labs stable. Following closely with ENT     Physical Exam  Vitals:    12/01/22 1347   BP: (!) 148/87   Pulse: 83   Resp: 20   Temp: 98.4 °F (36.9 °C)     General: Alert and oriented. NAD  Eye: Pupils are equal, round and reactive to light, Extraocular movements are intact. Normal conjunctiva  HENT: Normocephalic. Oropharynx exam deferred; mask in place due to coronavirus  Neck: Supple, Non-tender  Respiratory: Respirations are non-labored, Symmetrical chest wall expansion. Breath sounds CTA bilaterally  Cardiovascular: Regular rate, rhythm, Normal peripheral perfusion, No bilateral lower extremity edema  Gastrointestinal: Non-distended, Present bowel sounds   Genitourinary: Exam deferred  Lymphatics: No lymphadenopathy appreciated  Musculoskeletal: Moves all extremities  Integumentary: Intact. Warm, dry. No rashes, or lesions to visible skin  Neurologic: No focal deficits  Psychiatric: Cooperative. Appropriate mood and affect   ECOG Performance Scale: 1 - Capable of all self-care able to carry out light work activities.    Assessment:  # 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, p16 +:  -presentation: 07/2022:  Dysphagia, odynophagia, 20 lb weight loss, bilateral neck masses, hoarseness  -CT soft tissues of the neck without contrast 07/05/2022, 07/29/2022  -S/P panendoscopy 07/06/2022  -ulcerated tongue base mass, at least 5 cm  -ulcerated friable mass base of the tongue, confined base of tongue on panendoscopy  -right level 2 lymph node 3.2 x 2.9 cm on CT  -left level 3 lymph node 5.1 x 4.2 cm on CT  -invasive squamous cell carcinoma, grade 3 of 4; p16 +  -no intrathoracic metastasis on CT chest  >>cT3 cN2 M0, clinical stage II     # Extensive DVT right upper extremity, including subclavian vein, axillary vein, and basilic vein   - on 09/15/2022, secondary to PICC line  -right-sided PICC line removed; MediPort was unable to place; chemotherapy is being continued with  PICC line on the left side   -on Xarelto    # History of tobacco abuse since age 17  # History of alcohol use  -strongly advised on cessation of both the above       Plan:  -s/p completion of concurrent chemoradiation   -Cisplatin 100 mg/m² IV days 1, 22, and 43, concurrent with RT  -High-dose cisplatin started 08/25/2022, concurrent with RT; completed 10/10/2022  -Restaging PET/CT due 1/2023 - this is 12 weeks following completion of concurrent chemoradiation    -Continue Xarelto for DVT     -Have sent a consultation request to Internal Medicine for him to get established with a PCP.    -Viscous Lidocaine for mouth discomfort prescribed; have recommended Papaya juice and warm liquids   -ENT for scope and follow up on 12/7/22  -RTC in 4 weeks; sooner in the event of any clinical issues or concerns from an oncological standpoint       Above discussed with him.  All questions answered.    He and his wife understand and agree with this plan.        Post CRT neck evaluation:   4-8 weeks clinical assessment as appropriate:   1.  If residual primary, persistent disease, or progression: Then assess extent of disease or distant metastasis with CT or MRI with contrast or FDG PET/CT; followed by resection   2.  If response: Then, assess extent of disease or distant metastasis with FDG PET/CT at minimum 12 weeks weeks (preferred), or CT of primary and neck and/or MRI with contrast at 8-12 weeks

## 2022-12-07 ENCOUNTER — OFFICE VISIT (OUTPATIENT)
Dept: OTOLARYNGOLOGY | Facility: CLINIC | Age: 57
End: 2022-12-07
Payer: MEDICAID

## 2022-12-07 VITALS
HEART RATE: 85 BPM | WEIGHT: 167 LBS | BODY MASS INDEX: 24.18 KG/M2 | SYSTOLIC BLOOD PRESSURE: 138 MMHG | OXYGEN SATURATION: 100 % | DIASTOLIC BLOOD PRESSURE: 86 MMHG | TEMPERATURE: 99 F

## 2022-12-07 DIAGNOSIS — C01 SQUAMOUS CELL CARCINOMA OF BASE OF TONGUE: Primary | ICD-10-CM

## 2022-12-07 DIAGNOSIS — C77.0 SECONDARY MALIGNANT NEOPLASM OF CERVICAL LYMPH NODE: ICD-10-CM

## 2022-12-07 PROCEDURE — 99214 OFFICE O/P EST MOD 30 MIN: CPT | Mod: PBBFAC | Performed by: STUDENT IN AN ORGANIZED HEALTH CARE EDUCATION/TRAINING PROGRAM

## 2022-12-07 PROCEDURE — 31575 DIAGNOSTIC LARYNGOSCOPY: CPT | Mod: PBBFAC | Performed by: STUDENT IN AN ORGANIZED HEALTH CARE EDUCATION/TRAINING PROGRAM

## 2022-12-07 RX ORDER — LIDOCAINE HYDROCHLORIDE 40 MG/ML
2 INJECTION, SOLUTION RETROBULBAR
Status: DISCONTINUED | OUTPATIENT
Start: 2022-12-07 | End: 2023-12-19

## 2022-12-07 NOTE — PROGRESS NOTES
The scope used for the exam was:  Flexible scope ENF-P4  Serial Number:  1)    [id number]    [x]   2)    [id number]    []   3)    [id number]    []   4)    [id number]    []   5)    [id number]    []   6)    [id number]    []       The scope used for the exam was:  Rigid scope   Serial Number:  1)   [id number]    []   2)   [id number]    []   3)   7330    []   4)    3384   []   5)    0824   []   6)    5554   []     7)   7425    []   8)   2240    []   9)   1109    []

## 2022-12-07 NOTE — PROGRESS NOTES
[facility]  Otolaryngology Clinic Note    Yaron Brennan  Encounter Date: 12/7/2022  YOB: 1965  Physician: Tony Pepe    Chief Complaint: Otalgia, neck mass    HPI: Yaron Brennan is a 57 y.o. male with HTN who presents as referral from Dr. Welsh for squamous cell carcinoma of base of tongue. Patient reports that toward the beginning of the year he started to have worsening left otalgia. He then noticed a bump/swelling on left neck that he thought was was due to a bug bite. This got progressively bigger over time and he then noticed some swelling on right neck as well. He eventually presented to [facility] in early July 2022. He had a CT neck that showed an ulcerated mass in the tongue base, and underwent direct laryngoscopy with ENT on 7/6/22 (Dr. Tay). Operative findings: ulcerated friable mass base of the tongue that seems confined to base of tongue; lingual surface of epiglottis is free from tumor; mass involves bilateral base of tongue, significantly midline. Results returned positive for p16+ SCCa. Today in clinic patient reports bilateral L>R otalgia, mild dysphagia/odynophagia. He reports he eats basically whatever he wants without overt signs of aspiration. He does report 40lb weight loss in the last 6 months. + Neck LAD. Reports voice changes. Denies any issues breathing. Smoked 1ppd since 17 years old (40 years) but quit a month ago when he got his diagnosis. Also previously drank 12 pack of beer a day and likewise quite a month ago. Referred to ENT for consideration of possible surgical resection.     07/05/2022: CT soft tissue of the neck without contrast:  -ulcerated tongue base mass, at least 3.4 cm by 3.0 cm x 3.1 cm; associated bulky level 2 and level 3 cervical lymphadenopathy; suspected level 1 and level 4 cervical lymphadenopathy; a right level 2 lymph node with central necrosis 2.6 x 3.3 cm; a left level 3 lymph node 4.5 x 3.2 cm; no  significant airway compromise; no acute or aggressive bony lesions  -malignant ulcerated type mass with lymph node metastasis; no airway compromise; right upper lung lobe indistinct ground-glass pulmonary nodules typical of benign infectious or inflammatory etiology    8/16/22: Here today for follow up. Had dental extractions performed on 8/5. Not having any issues. Taking in 4-5 ensures daily. Trying to take some other soft foods like grits and pudding.     9/22/22: Here for follow up. Patient is undergoing CRT and tolerating it well. He reports he has one more chemotherapy session left and about 2-3 weeks of radiation left. Overall maintaining his weight. No progressive dysphagia, swallowing fine. Also no breathing issues or other complaints.    11/3/22: Patient is here today for follow up.  He finished CRT on 10/10.  Patient states he is still in some pain in  his neck and inside of his mouth.   He is tolerating p.o. intake but states at times he does have difficulty swallowing.  He does have xerostomia.  Patient has not used his eardrops to loosen the cerumen impaction.  Denies any shortness of breath or difficulty breathing/ changes to voice,  changes to his neck masses.    12/7/22:  Patient returns for surveillance today.  Patient denies any new dysphagia, odynophagia, H&N lumps or bumps, otalgia, weight loss.  Overall pain is getting better, tolerating diet, gaining weight.    ROS:   General: Negative except per HPI  Skin: Denies rash, ulcer, or lesion.  Eyes: Denies vision changes or diplopia.  Ears: Negative except per HPI  Nose: Negative except per HPI  Throat/mouth: Negative except per HPI  Cardiovascular: Negative except per HPI  Respiratory: Negative except per HPI  Neck: Negative except per HPI  Endocrine: Negative except per HPI  Neurologic: Negative except per HPI    Other 10-point review of systems negative except per HPI      Review of patient's allergies indicates:  No Known Allergies    Past  Medical History:   Diagnosis Date    Cancer     Hypertension        Past Surgical History:   Procedure Laterality Date    BACK SURGERY      DIRECT LARYNGOSCOPY N/A 2022    Procedure: LARYNGOSCOPY, DIRECT;  Surgeon: Mekhi Grossman MD;  Location[facility] OR;  Service: ENT;  Laterality: N/A;    EXTRACTION OF TOOTH N/A 2022    Procedure: EXTRACTION, TEETH;  Surgeon: Mekhi Grossman MD;  Location: [facility];  Service: ENT;  Laterality: N/A;    SPINE SURGERY      Not sure of date maybe in        Social History     Socioeconomic History    Marital status:      Spouse name: Blanca   Occupational History    Occupation: Disabled   Tobacco Use    Smoking status: Former     Types: Cigarettes     Start date:      Quit date: 2022     Years since quittin.4    Smokeless tobacco: Never    Tobacco comments:     Been almost 35 yr- Using Nicotine patches- denies smoking at current time   Substance and Sexual Activity    Alcohol use: Not Currently    Drug use: Yes     Types: Marijuana     Comment: Daily    Sexual activity: Yes     Partners: Female     Birth control/protection: None     Social Determinants of Health     Transportation Needs: No Transportation Needs    Lack of Transportation (Medical): No    Lack of Transportation (Non-Medical): No   Housing Stability: High Risk    Unable to Pay for Housing in the Last Year: Yes    Number of Places Lived in the Last Year: 2    Unstable Housing in the Last Year: No       Family History   Problem Relation Age of Onset    Hypertension Mother     Cancer Father     Hypertension Sister     Hypertension Brother        Outpatient Encounter Medications as of 2022   Medication Sig Dispense Refill    amLODIPine (NORVASC) 10 MG tablet Take 1 tablet (10 mg total) by mouth once daily. 90 tablet 1    gabapentin (NEURONTIN) 300 MG capsule Take 300 mg by mouth 3 (three) times daily.      HYDROcodone-acetaminophen (NORCO) 5-325 mg per tablet Take 1 tablet by  mouth every 6 (six) hours as needed.      hydrocortisone (PROCTOCORT) 10 % (80 mg) rectal foam Place 1 applicator rectally 2 (two) times daily as needed (hemorrhoidal flare). 15 g 1    LIDOcaine HCl 2% (XYLOCAINE) 2 % Soln Swish and spit with 15 mL every 6 hours as needed. Do not swallow. 100 mL 0    mometasone (ELOCON) 0.1 % ointment Apply topically 2 (two) times daily.      nicotine (NICODERM CQ) 21 mg/24 hr 1 patch once daily.      ondansetron (ZOFRAN-ODT) 8 MG TbDL Take 1 tablet (8 mg total) by mouth every 8 (eight) hours as needed (nausea). 90 tablet 3    rivaroxaban (XARELTO) 20 mg Tab Start taking 1 tablet once daily by mouth with evening meal 30 tablet 1    SPS, WITH SORBITOL, 15-20 gram/60 mL Susp Take by mouth.       Facility-Administered Encounter Medications as of 12/7/2022   Medication Dose Route Frequency Provider Last Rate Last Admin    lactated ringers infusion   Intravenous Continuous Elzbieta Quesada MD 10 mL/hr at 08/05/22 0808 New Bag at 08/05/22 0808    LIDOcaine (PF) 10 mg/ml (1%) injection 10 mg  1 mL Intradermal Once SANDY Levy        sodium chloride 0.9% flush 10 mL  10 mL Intravenous PRN Anna Taylor MD           Physical Exam:  Vitals:    12/07/22 0812   BP: 138/86   Pulse: 85   Temp: 98.9 °F (37.2 °C)   TempSrc: Oral   SpO2: 100%   Weight: 75.8 kg (167 lb)       Constitutional  General Appearance: well nourished, well-developed, AAO x3, NAD; voice muffled/hot potato voice  HEENT  Eyes: PEERLA, EOMI, normal conjunctivae  Ears: Hearing well at conversation level;    AD: auricle normal,cerumen impaction   AS: auricle normal, cerumen impaction   Vestibular: ambulates without gait disturbance  Nose: septum midline, no inferior turbinate hypertrophy, no polyps, moist mucosa without erythema or blue hue  OC/OP: dentition poor, no oral lesions,  unable to fully palpate tongue/FOM/BOT due to very bad gag reflex- soft, no leukoplakia/ulcerations/ tenderness, good protrusion of  tongue  Nasopharynx, Hypopharynx, and Larynx:    Indirect: attempted, limited view due to patient intolerance  Neck: soft neck bilaterally, no discrete lymph nodes appreciated though previous left level III and right level II nodes feel somewhat matted.   Postradiation hyperpigmentation to the anterior neck.  Neuro: CN II - XII intact bilaterally  Cardiovascular: peripheral pulses palpable  Respiratory: non-labored respirations  Psychiatric: oriented to time, place and person, no depression, anxiety or agitation    Flexible Fiberoptic Laryngoscopy/Nasopharyngoscopy via right nare  Anesthesia: none   Adverse Events: None  Resident Surgeon: Tony Pepe MD  Blood loss: none  Condition: good    Procedure in Detail: Informed consent was obtained from the patient after explanation of procedure, indications, risks and benefits. Flexible endoscopy was performed on Yaron Brennan through the right nasal passages. The nasal cavity, nasopharynx, oropharynx, hypopharynx and larynx were adequately visualized. The true vocal cords and arytenoids were examined during phonation and repose.    Findings:  NP/OP: no masses/lesions of NC, eustachian tube, fossa of Rosenmuller, no adenoid hypertrophy  BOT/vallecula:   moderate edema, appears to have small right sided healing ulceration, edema of bilateral lateral pharyngeal walls  Piriform sinuses/post-cricoid: no masses/lesions, no pooling of secretions  Epiglottis: lingual and laryngeal surfaces within normal limits  Arytenoids/FVFs: no masses/lesions, no edema, bilateral mobility  TVCs: bilateral cord mobility, no masses or lesions  No aspiration, no pooled secretions    Patient does have postradiation changes throughout the larynx.      Pertinent Data:  ? LABS:  ? AUDIO:  ? CT Scan: No new imaging    Assessment/Plan:  Yaron Brennan is a 57 y.o. 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 CRT, finished 10/10/22.   Currently doing well  following his CRT.    -   Patient is supposed to get PET scan later this month, discussed with patient need to bring in disc with images  - Obtain CT soft tissue neck and TSH before next visit  - Patient will return to clinic in 1 month for cancer surveillance and scope exam    Tony Pepe MD  [facility] Otolaryngology PGY IV        HEAD & NECK CANCER SURVEILLANCE   Radiation and/or Chemotherapy     Medical Oncologist: Kevon Welsh MD  Radiation Oncologist: Unknown    Primary tumor: cT3 cN2 M0 p16+ base of tongue    Date of Diagnosis: 7/6/22  Therapy: Cisplatin 100 mg/m² IV days 1, 22, and 23, concurrent with RT  Completion Date: 10/10/22    Pertinent Treatment History:   first presentation 7/2022  CT neck 7/5/22 & 7/29/22  Panendoscopy 7/6/22  Right UE DVT 2/2 PICC 9/15/22 on Xarelto  CRT 8/25/22 - 10/10/22      Place date if completed   3 6 12 18 24 36 48 60   CT Neck X X X X X X X X   PET/CT X          CXR  X X X X X X X   TFT X  X  X X X X     Current Surveillance Interval:  <1 year; every 4-6 weeks

## 2022-12-12 NOTE — PROGRESS NOTES
I reviewed the history and physical exam with the resident.   I agree with findings and plan.    Mekhi Grossman M.D.

## 2022-12-29 ENCOUNTER — OFFICE VISIT (OUTPATIENT)
Dept: HEMATOLOGY/ONCOLOGY | Facility: CLINIC | Age: 57
End: 2022-12-29
Payer: MEDICAID

## 2022-12-29 VITALS
TEMPERATURE: 98 F | RESPIRATION RATE: 20 BRPM | SYSTOLIC BLOOD PRESSURE: 149 MMHG | OXYGEN SATURATION: 100 % | HEART RATE: 64 BPM | WEIGHT: 160 LBS | DIASTOLIC BLOOD PRESSURE: 94 MMHG | HEIGHT: 70 IN | BODY MASS INDEX: 22.9 KG/M2

## 2022-12-29 DIAGNOSIS — C01 SQUAMOUS CELL CARCINOMA OF BASE OF TONGUE: Primary | ICD-10-CM

## 2022-12-29 DIAGNOSIS — Z86.718 HISTORY OF DVT IN ADULTHOOD: ICD-10-CM

## 2022-12-29 DIAGNOSIS — C77.0 SECONDARY MALIGNANT NEOPLASM OF CERVICAL LYMPH NODE: ICD-10-CM

## 2022-12-29 PROCEDURE — 1159F PR MEDICATION LIST DOCUMENTED IN MEDICAL RECORD: ICD-10-PCS | Mod: CPTII,,, | Performed by: NURSE PRACTITIONER

## 2022-12-29 PROCEDURE — 99214 OFFICE O/P EST MOD 30 MIN: CPT | Mod: PBBFAC | Performed by: NURSE PRACTITIONER

## 2022-12-29 PROCEDURE — 3008F BODY MASS INDEX DOCD: CPT | Mod: CPTII,,, | Performed by: NURSE PRACTITIONER

## 2022-12-29 PROCEDURE — 3061F NEG MICROALBUMINURIA REV: CPT | Mod: CPTII,,, | Performed by: NURSE PRACTITIONER

## 2022-12-29 PROCEDURE — 3008F PR BODY MASS INDEX (BMI) DOCUMENTED: ICD-10-PCS | Mod: CPTII,,, | Performed by: NURSE PRACTITIONER

## 2022-12-29 PROCEDURE — 3066F NEPHROPATHY DOC TX: CPT | Mod: CPTII,,, | Performed by: NURSE PRACTITIONER

## 2022-12-29 PROCEDURE — 99213 PR OFFICE/OUTPT VISIT, EST, LEVL III, 20-29 MIN: ICD-10-PCS | Mod: S$PBB,,, | Performed by: NURSE PRACTITIONER

## 2022-12-29 PROCEDURE — 3080F DIAST BP >= 90 MM HG: CPT | Mod: CPTII,,, | Performed by: NURSE PRACTITIONER

## 2022-12-29 PROCEDURE — 3077F SYST BP >= 140 MM HG: CPT | Mod: CPTII,,, | Performed by: NURSE PRACTITIONER

## 2022-12-29 PROCEDURE — 3061F PR NEG MICROALBUMINURIA RESULT DOCUMENTED/REVIEW: ICD-10-PCS | Mod: CPTII,,, | Performed by: NURSE PRACTITIONER

## 2022-12-29 PROCEDURE — 1159F MED LIST DOCD IN RCRD: CPT | Mod: CPTII,,, | Performed by: NURSE PRACTITIONER

## 2022-12-29 PROCEDURE — 3080F PR MOST RECENT DIASTOLIC BLOOD PRESSURE >= 90 MM HG: ICD-10-PCS | Mod: CPTII,,, | Performed by: NURSE PRACTITIONER

## 2022-12-29 PROCEDURE — 99213 OFFICE O/P EST LOW 20 MIN: CPT | Mod: S$PBB,,, | Performed by: NURSE PRACTITIONER

## 2022-12-29 PROCEDURE — 3077F PR MOST RECENT SYSTOLIC BLOOD PRESSURE >= 140 MM HG: ICD-10-PCS | Mod: CPTII,,, | Performed by: NURSE PRACTITIONER

## 2022-12-29 PROCEDURE — 3066F PR DOCUMENTATION OF TREATMENT FOR NEPHROPATHY: ICD-10-PCS | Mod: CPTII,,, | Performed by: NURSE PRACTITIONER

## 2022-12-29 NOTE — PROGRESS NOTES
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 Intermittent alcohol use.  Procedure/surgical history: Back surgery (20-30 years ago, in use 10, apparently involving L5 fusion for prolapse this from work-related injury).  Social history:  Has been smoking a pack of cigarettes daily since age 17 years.  Has been drinking 6 beers daily for last 40 years.  Lately, trying to quit smoking and drinking.  Smokes marijuana.  .  Has 2 biological children of his own.  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  Lives in [place], [place]..  Family history:  Father  from some kind of cancer at age 72 years.  Health maintenance:  Does not have a regular primary care provider.  Does not visit with doctors.  No screening colonoscopy ever.     History of Present illness:  57-year-old gentleman     He was admitted to [facility] 2022 with 2 months history of progressive dysphagia, odynophagia, soreness of tongue, 20 lb weight loss and bilateral neck masses.  Also, hoarseness.  CT neck showed ulcerated mass in the tongue base, malignant-appearing, along with lymph node metastasis.  ENT performed a laryngoscopy with biopsy of tongue mass.  Friable base of tongue mass.  No airway compromise.  Right upper lung lobe indistinct, ground-glass lung nodules, typical of a benign inflammatory etiology.  Discharged 2022.  Patient reported weight loss of> 20 lb in previous 2 months.    Interval History   Clinic follow up today, 22; accompanied by his spouse. Discomfort of oral cavity/throat improved. Completed concurrent chemoradiation 10/10/2022; relatively stable in terms of how he's feeling. Stable energy level and appetite. Denies other significant  problems or concerns     Physical Exam  Vitals:    12/29/22 1023   BP: (!) 149/94   Pulse: 64   Resp: 20   Temp: 98.1 °F (36.7 °C)     General: Alert and oriented. NAD  Eye: Pupils are equal, round and reactive to light, Extraocular movements are intact. Normal conjunctiva  HENT: Normocephalic. Oropharynx exam deferred; mask in place due to coronavirus  Neck: Supple, Non-tender  Respiratory: Respirations are non-labored, Symmetrical chest wall expansion. Breath sounds CTA bilaterally  Cardiovascular: Regular rate, rhythm, Normal peripheral perfusion, No bilateral lower extremity edema  Gastrointestinal: Non-distended, Present bowel sounds   Genitourinary: Exam deferred  Lymphatics: No lymphadenopathy appreciated  Musculoskeletal: Moves all extremities  Integumentary: Intact. Warm, dry. No rashes, or lesions to visible skin  Neurologic: No focal deficits  Psychiatric: Cooperative. Appropriate mood and affect   ECOG Performance Scale: 1 - Capable of all self-care able to carry out light work activities.    Assessment:  # 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, p16 +:  -presentation: 07/2022:  Dysphagia, odynophagia, 20 lb weight loss, bilateral neck masses, hoarseness  -CT soft tissues of the neck without contrast 07/05/2022, 07/29/2022  -S/P panendoscopy 07/06/2022  -ulcerated tongue base mass, at least 5 cm  -ulcerated friable mass base of the tongue, confined base of tongue on panendoscopy  -right level 2 lymph node 3.2 x 2.9 cm on CT  -left level 3 lymph node 5.1 x 4.2 cm on CT  -invasive squamous cell carcinoma, grade 3 of 4; p16 +  -no intrathoracic metastasis on CT chest  >>cT3 cN2 M0, clinical stage II     # Extensive DVT right upper extremity, including subclavian vein, axillary vein, and basilic vein   - on 09/15/2022, secondary to PICC line  -right-sided PICC line removed; [facility] was unable to place; chemotherapy is being continued with PICC line on the left side   -on Xarelto    # History of tobacco  abuse since age 17  # History of alcohol use  -strongly advised on cessation of both the above       Plan:  -s/p completion of concurrent chemoradiation   -Cisplatin 100 mg/m² IV days 1, 22, and 43, concurrent with RT  -High-dose cisplatin started 08/25/2022, concurrent with RT; completed 10/10/2022  -Restaging PET/CT due 1/2023 - this is 12 weeks following completion of concurrent chemoradiation    -Continue Xarelto for DVT     -Have sent a consultation request to Internal Medicine for him to get established with a PCP.    -CT soft tissue neck pending; following closely with ENT; next appt in 2 weeks    -PET/CT has been denied by his insurance    -Follow up: 1/20/23 as scheduled; advised to contact our office for a sooner appt in the event of any clinical issues or concerns from an oncological standpoint      Above discussed with him.  All questions answered.    He agrees with this plan.      Post CRT neck evaluation:   4-8 weeks clinical assessment as appropriate:   1.  If residual primary, persistent disease, or progression: Then assess extent of disease or distant metastasis with CT or MRI with contrast or FDG PET/CT; followed by resection   2.  If response: Then, assess extent of disease or distant metastasis with FDG PET/CT at minimum 12 weeks weeks (preferred), or CT of primary and neck and/or MRI with contrast at 8-12 weeks

## 2022-12-29 NOTE — Clinical Note
Please schedule PET/CT to be done within the next 1 - 2 weeks Will need labs, MD visit for scan review in 3 weeks

## 2023-01-03 ENCOUNTER — TELEPHONE (OUTPATIENT)
Dept: HEMATOLOGY/ONCOLOGY | Facility: CLINIC | Age: 58
End: 2023-01-03
Payer: MEDICAID

## 2023-01-19 ENCOUNTER — TELEPHONE (OUTPATIENT)
Dept: HEMATOLOGY/ONCOLOGY | Facility: CLINIC | Age: 58
End: 2023-01-19
Payer: MEDICAID

## 2023-01-20 ENCOUNTER — OFFICE VISIT (OUTPATIENT)
Dept: HEMATOLOGY/ONCOLOGY | Facility: CLINIC | Age: 58
End: 2023-01-20
Payer: MEDICAID

## 2023-01-20 VITALS
RESPIRATION RATE: 20 BRPM | WEIGHT: 164 LBS | OXYGEN SATURATION: 99 % | DIASTOLIC BLOOD PRESSURE: 86 MMHG | HEIGHT: 70 IN | TEMPERATURE: 98 F | SYSTOLIC BLOOD PRESSURE: 164 MMHG | BODY MASS INDEX: 23.48 KG/M2 | HEART RATE: 67 BPM

## 2023-01-20 DIAGNOSIS — R19.5 POSITIVE COLORECTAL CANCER SCREENING USING COLOGUARD TEST: ICD-10-CM

## 2023-01-20 DIAGNOSIS — C77.0 SECONDARY MALIGNANT NEOPLASM OF CERVICAL LYMPH NODE: ICD-10-CM

## 2023-01-20 DIAGNOSIS — Z72.0 TOBACCO ABUSE: ICD-10-CM

## 2023-01-20 DIAGNOSIS — I82.A11 ACUTE DEEP VEIN THROMBOSIS (DVT) OF AXILLARY VEIN OF RIGHT UPPER EXTREMITY: ICD-10-CM

## 2023-01-20 DIAGNOSIS — Z78.9 ALCOHOL USE: ICD-10-CM

## 2023-01-20 DIAGNOSIS — I82.B11 RIGHT SUBCLAVIAN VEIN THROMBOSIS: ICD-10-CM

## 2023-01-20 DIAGNOSIS — C01 SQUAMOUS CELL CARCINOMA OF BASE OF TONGUE: Primary | ICD-10-CM

## 2023-01-20 PROCEDURE — 3008F BODY MASS INDEX DOCD: CPT | Mod: CPTII,,, | Performed by: INTERNAL MEDICINE

## 2023-01-20 PROCEDURE — 1159F PR MEDICATION LIST DOCUMENTED IN MEDICAL RECORD: ICD-10-PCS | Mod: CPTII,,, | Performed by: INTERNAL MEDICINE

## 2023-01-20 PROCEDURE — 1160F PR REVIEW ALL MEDS BY PRESCRIBER/CLIN PHARMACIST DOCUMENTED: ICD-10-PCS | Mod: CPTII,,, | Performed by: INTERNAL MEDICINE

## 2023-01-20 PROCEDURE — 3079F DIAST BP 80-89 MM HG: CPT | Mod: CPTII,,, | Performed by: INTERNAL MEDICINE

## 2023-01-20 PROCEDURE — 99214 PR OFFICE/OUTPT VISIT, EST, LEVL IV, 30-39 MIN: ICD-10-PCS | Mod: S$PBB,,, | Performed by: INTERNAL MEDICINE

## 2023-01-20 PROCEDURE — 1159F MED LIST DOCD IN RCRD: CPT | Mod: CPTII,,, | Performed by: INTERNAL MEDICINE

## 2023-01-20 PROCEDURE — 3008F PR BODY MASS INDEX (BMI) DOCUMENTED: ICD-10-PCS | Mod: CPTII,,, | Performed by: INTERNAL MEDICINE

## 2023-01-20 PROCEDURE — 3077F SYST BP >= 140 MM HG: CPT | Mod: CPTII,,, | Performed by: INTERNAL MEDICINE

## 2023-01-20 PROCEDURE — 3077F PR MOST RECENT SYSTOLIC BLOOD PRESSURE >= 140 MM HG: ICD-10-PCS | Mod: CPTII,,, | Performed by: INTERNAL MEDICINE

## 2023-01-20 PROCEDURE — 99214 OFFICE O/P EST MOD 30 MIN: CPT | Mod: S$PBB,,, | Performed by: INTERNAL MEDICINE

## 2023-01-20 PROCEDURE — 3079F PR MOST RECENT DIASTOLIC BLOOD PRESSURE 80-89 MM HG: ICD-10-PCS | Mod: CPTII,,, | Performed by: INTERNAL MEDICINE

## 2023-01-20 PROCEDURE — 1160F RVW MEDS BY RX/DR IN RCRD: CPT | Mod: CPTII,,, | Performed by: INTERNAL MEDICINE

## 2023-01-20 PROCEDURE — 99214 OFFICE O/P EST MOD 30 MIN: CPT | Mod: PBBFAC | Performed by: INTERNAL MEDICINE

## 2023-01-20 NOTE — PROGRESS NOTES
History:  Past Medical History:   Diagnosis Date    Cancer     Hypertension    Past medical history:  Tobacco abuse since age 17. Intermittent alcohol use.  Procedure/surgical history: Back surgery (20-30 years ago, in use 10, apparently involving L5 fusion for prolapse this from work-related injury).  Social history:  Has been smoking a pack of cigarettes daily since age 17 years.  Has been drinking 6 beers daily for last 40 years.  Lately, trying to quit smoking and drinking.  Smokes marijuana.  .  Has 2 biological children of his own.  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  Lives in [place], [place]..  Family history:  Father  from some kind of cancer at age 72 years.  Health maintenance:  Does not have a regular primary care provider.  Does not visit with doctors.  No screening colonoscopy ever    Past Surgical History:   Procedure Laterality Date    BACK SURGERY      DIRECT LARYNGOSCOPY N/A 2022    Procedure: LARYNGOSCOPY, DIRECT;  Surgeon: Mekhi Grossman MD;  Location: [facility];  Service: ENT;  Laterality: N/A;    EXTRACTION OF TOOTH N/A 2022    Procedure: EXTRACTION, TEETH;  Surgeon: Mekhi Grossman MD;  Location: [facility];  Service: ENT;  Laterality: N/A;    SPINE SURGERY      Not sure of date maybe in       Social History     Socioeconomic History    Marital status:      Spouse name: Blanca   Occupational History    Occupation: Disabled   Tobacco Use    Smoking status: Former     Types: Cigarettes     Start date:      Quit date: 2022     Years since quittin.5    Smokeless tobacco: Never    Tobacco comments:     Been almost 35 yr- Using Nicotine patches- denies smoking at current time   Substance and Sexual Activity    Alcohol use: Not Currently    Drug use: Yes     Types: Marijuana     Comment: Daily    Sexual activity: Yes     Partners: Female     Birth control/protection: None     Social  Determinants of Health     Transportation Needs: No Transportation Needs    Lack of Transportation (Medical): No    Lack of Transportation (Non-Medical): No   Housing Stability: High Risk    Unable to Pay for Housing in the Last Year: Yes    Number of Places Lived in the Last Year: 2    Unstable Housing in the Last Year: No      Family History   Problem Relation Age of Onset    Hypertension Mother     Cancer Father     Hypertension Sister     Hypertension Brother         Reason for Follow-up:  -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; p16 +; cT3 cN2 M0, clinical stage II, S/P panendoscopy and biopsy 07/06/2022   -PICC line-related DVT right subclavian, axillary, and basilic veins 09/15/2022  -history of tobacco abuse, alcohol abuse    History of Present Illness:   Cancer        Oncologic/Hematologic History:  Oncology History   Squamous cell carcinoma of base of tongue   6/6/2022 Cancer Staged    Staging form: Pharynx - HPV-Mediated Oropharynx, AJCC 8th Edition  - Clinical stage from 6/6/2022: Stage II (cT2, cN2, cM0, p16+)       7/16/2022 Initial Diagnosis    Squamous cell carcinoma of base of tongue     8/25/2022 -  Chemotherapy    Treatment Summary   Plan Name: OP HEAD NECK CISPLATIN Q3W  Treatment Goal: Curative  Status: Active  Start Date: 8/25/2022  End Date: 10/11/2022  Provider: Kevon Welsh MD  Chemotherapy: CISplatin (PLATINOL) 100 mg/m2 = 187 mg in sodium chloride 0.9% 1,187 mL chemo infusion, 100 mg/m2 = 187 mg, Intravenous, Clinic/HOD 1 time, 3 of 3 cycles  Administration: 187 mg (8/25/2022), 187 mg (9/19/2022), 187 mg (10/10/2022)       57-year-old gentleman     He was admitted to [facility] 07/2022 with 2 months history of progressive dysphagia, odynophagia, soreness of tongue, 20 lb weight loss and bilateral neck masses.  Also, hoarseness.  CT neck showed ulcerated mass in the tongue base, malignant-appearing, along with lymph node metastasis.  ENT performed a  laryngoscopy with biopsy of tongue mass.  Friable base of tongue mass.  No airway compromise.  Right upper lung lobe indistinct, ground-glass lung nodules, typical of a benign inflammatory etiology.  Discharged 07/07/2022.  Patient reported weight loss of> 20 lb in previous 2 months.    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, p16 +:  -presentation: 07/2022:  Dysphagia, odynophagia, 20 lb weight loss, bilateral neck masses, hoarseness  -CT soft tissues of the neck without contrast 07/05/2022, 07/29/2022  -S/P panendoscopy 07/06/2022  -ulcerated tongue base mass, at least 5 cm  -ulcerated friable mass base of the tongue, confined base of tongue on panendoscopy  -right level 2 lymph node 3.2 x 2.9 cm on CT  -left level 3 lymph node 5.1 x 4.2 cm on CT  -invasive squamous cell carcinoma, grade 3 of 4; p16 +  -no intrathoracic metastasis on CT chest  >>cT3 cN2 M0, clinical stage II  -08/25/2022:  High-dose cisplatin, concurrent with radiotherapy, started  -09/14/2022: Staging PET-CT (comparison:  CT soft tissues of the neck 07/29/2022):  1. Tongue base midline central necrotic ABD hypermetabolic mass, consistent with squamous cell carcinoma (4.2 x 2.4 cm, maximum SUV 10.02  2. Bilateral neck hypermetabolic enlarged lymph nodes are consistent with metastases (left neck level 3 lymph node, centrally necrotic sabrina complex, 4.6 x 4.4 cm, maximum SUV 5.27); left neck level 2 hypermetabolic enlarged lymph node, 2.2 x 1.8 cm, maximum SUV 7.05) (right neck level 2 central necrotic peripherally hypermetabolic enlarged lymph node, 3.0 x 2.4 cm, maximum SUV 4.27)  -09/15/2022:  CV ultrasound Doppler venous right upper extremity (pain and swelling right upper extremity):  + for DVT; extensive thrombus seen in right subclavian, axilla, and basilic veins; also, thrombus in proximal cephalic vein (started on Xarelto)  (The DVT was related to PICC line; it was removed by surgery on 09/15/2022; MediPort was  unable to be placed)  -cycle 3 of high-dose cisplatin 10/11/2022  -S/P radiotherapy 08/24/2022-10/11/2022 (7000 cGy; 35 fractions; 48 elapsed days)  -11/21/2022: Cologuard +  -01/11/2023:  Restaging PET-CT (comparison:  Whole-body PET-CT 08/18/2022):  No residual primary tongue malignancy or cervical metastatic lymphadenopathy and no new metastatic disease identified at the neck or distantly        07/18/2022:  Presents for initial medical oncology consultation, accompanied by his wife.  Pleasant gentleman.  In no acute discomfort.  40 lb weight loss in last 3 months.  Appetite is preserved.  Dysphagia.  No odynophagia.  Pain left side of throat overall the lymphadenopathy as well as during swallowing.  No hemoptysis, nausea, vomiting.  No dyspnea. Left-sided otalgia.  ECOG 0.   Chronic tobacco and alcohol abuse (see above).  Bilateral cervical lymphadenopathy started 3 months ago; slowly progressive.  -no unusual headaches, loss of consciousness, seizures, stroke-like symptoms, fevers, or chills.  No abdominal pain, nausea, vomiting.    Interval History:  PICC DOUBLE LUMEN LINE FLUSH   OP HEAD NECK CISPLATIN Q3W   01/20/2023:  -cycle 3 of high-dose cisplatin 10/11/2022  -S/P radiotherapy 08/24/2022-10/11/2022 (7000 cGy; 35 fractions; 48 elapsed days)  -11/21/2022: Cologuard +  -01/11/2023:  Restaging PET-CT (comparison:  Whole-body PET-CT 08/18/2022):  No residual primary tongue malignancy or cervical metastatic lymphadenopathy and no new metastatic disease identified at the neck or distantly  Labs reviewed.    Presents for a follow-up visit, accompanied by a female .  Doing well.  Some retinal in the throat.  Mild pain in the throat.  No dysphagia or odynophagia.  Able to swallow all consistently foods without problems.  ECOG 0.  He understands that he needs to stop smoking and drinking alcohol.  No dyspnea or hemoptysis.      Medications:  Current Outpatient Medications on File Prior to Visit    Medication Sig Dispense Refill    amLODIPine (NORVASC) 10 MG tablet Take 1 tablet (10 mg total) by mouth once daily. 90 tablet 1    gabapentin (NEURONTIN) 300 MG capsule Take 300 mg by mouth 3 (three) times daily.      HYDROcodone-acetaminophen (NORCO) 5-325 mg per tablet Take 1 tablet by mouth every 6 (six) hours as needed.      hydrocortisone (PROCTOCORT) 10 % (80 mg) rectal foam Place 1 applicator rectally 2 (two) times daily as needed (hemorrhoidal flare). 15 g 1    LIDOcaine HCl 2% (XYLOCAINE) 2 % Soln Swish and spit with 15 mL every 6 hours as needed. Do not swallow. 100 mL 0    mometasone (ELOCON) 0.1 % ointment Apply topically 2 (two) times daily.      nicotine (NICODERM CQ) 21 mg/24 hr 1 patch once daily.      ondansetron (ZOFRAN-ODT) 8 MG TbDL Take 1 tablet (8 mg total) by mouth every 8 (eight) hours as needed (nausea). 90 tablet 3    rivaroxaban (XARELTO) 20 mg Tab Start taking 1 tablet once daily by mouth with evening meal 30 tablet 1    SPS, WITH SORBITOL, 15-20 gram/60 mL Susp Take by mouth.       Current Facility-Administered Medications on File Prior to Visit   Medication Dose Route Frequency Provider Last Rate Last Admin    lactated ringers infusion   Intravenous Continuous Elzbieta Quesada MD 10 mL/hr at 08/05/22 0808 New Bag at 08/05/22 0808    LIDOcaine (PF) 10 mg/ml (1%) injection 10 mg  1 mL Intradermal Once SANDY Levy        LIDOcaine (PF) 40 mg/mL (4 %) injection 2 mL  2 mL Other 1 time in Clinic/HOD Tony Pepe MD        phenylephrine HCL 1% nasal spray 1 spray  1 spray Each Nostril 1 time in Clinic/HOD Tony Pepe MD        sodium chloride 0.9% flush 10 mL  10 mL Intravenous PRN Anna Taylor MD           Review of Systems:   All systems reviewed and found to be negative except for the symptoms detailed above    Physical Examination:   VITAL SIGNS:   Vitals:    01/20/23 0846   BP: (!) 164/86   Pulse: 67   Resp: 20   Temp: 98.4 °F (36.9 °C)     GENERAL:  In no  apparent distress.    HEAD:  No signs of head trauma.  EYES:  Pupils are equal.  Extraocular motions intact.    EARS:  Hearing grossly intact.  MOUTH:  Oropharynx is normal.   NECK:  No adenopathy, no JVD.     CHEST:  Chest with clear breath sounds bilaterally.  No wheezes, rales, rhonchi.    CARDIAC:  Regular rate and rhythm.  S1 and S2, without murmurs, gallops, rubs.  VASCULAR:  No Edema.  Peripheral pulses normal and equal in all extremities.  ABDOMEN:  Soft, without detectable tenderness.  No sign of distention.  No   rebound or guarding, and no masses palpated.   Bowel Sounds normal.  MUSCULOSKELETAL:  Good range of motion of all major joints. Extremities without clubbing, cyanosis or edema.    NEUROLOGIC EXAM:  Alert and oriented x 3.  No focal sensory or strength deficits.   Speech normal.  Follows commands.  PSYCHIATRIC:  Mood normal.    No results for input(s): CBC in the last 72 hours.   No results for input(s): CMP in the last 72 hours.     Assessment:  Problem List Items Addressed This Visit          Psychiatric    Alcohol use       Hematology    Acute deep vein thrombosis (DVT) of axillary vein of right upper extremity    Right subclavian vein thrombosis       Oncology    Squamous cell carcinoma of base of tongue - Primary    Secondary malignant neoplasm of cervical lymph node    Positive colorectal cancer screening using Cologuard test       Other    Tobacco abuse   # 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, p16 +:  -presentation: 07/2022:  Dysphagia, odynophagia, 20 lb weight loss, bilateral neck masses, hoarseness  -CT soft tissues of the neck without contrast 07/05/2022, 07/29/2022  -S/P panendoscopy 07/06/2022  -ulcerated tongue base mass, at least 5 cm  -ulcerated friable mass base of the tongue, confined base of tongue on panendoscopy  -right level 2 lymph node 3.2 x 2.9 cm on CT, 3.0 x 2.4 cm on PET-CT  -left level 3 lymph node 5.1 x 4.2 cm on CT, 4.6 x 4.4 cm on PET-CT  -left neck level 2  lymph node, 2.2 x 1.8 cm on PET-CT  -invasive squamous cell carcinoma, grade 3 of 4; p16 +  -no intrathoracic metastasis on CT chest  >>cT3 cN2 M0, clinical stage II  -plan: Chemoradiation therapy  -S/P high-dose cisplatin every 3 weeks x3 (08/25/2022-10/11/2022)  -S/P radiotherapy 08/24/2022-10/11/2022 (7000 cGy; 35 fractions; 48 elapsed days)  -11/21/2022: Cologuard +  -01/11/2023: Restaging PET-CT:  Complete response       # PICC line related DVT right subclavian, axillary, and basilic veins (09/15/2022):   -presentation:  Pain and swelling right upper extremity  -anticoagulation with Xarelto started 09/15/2022       # history of tobacco abuse since age 17  # history of alcohol use        Plan:  -S/P high-dose cisplatin every 3 weeks x3 (08/25/2022-10/11/2022)  -S/P radiotherapy 08/24/2022-10/11/2022 (7000 cGy; 35 fractions; 48 elapsed days)  -11/21/2022: Cologuard +  -01/11/2023: Restaging PET-CT:  Complete response  >>>  Surveillance   Regular follow-up with ENT   We will follow him peripherally  Follow-up in 6 months, with CBC, CMP, and TSH    -developed extensive DVT right upper extremity, including subclavian vein, axillary vein, and basilic vein 09/15/2022, secondary to PICC line  -right-sided PICC line removed; MediPort was unable to be placed; chemotherapy is being continued with PICC line on the left side   -anticoagulation with Xarelto started 09/15/2022  -anticoagulation to continue for at least 3 months (until the middle/end of December ' 22)  (He stopped Xarelto 12/15/2022, appropriately so)    -11/21/2022: Cologuard +  >>>  Refer to GI for colonoscopy     -abstinence from smoking and alcohol discussed once again and strongly encouraged.     Follow-up in 6 months with CBC, CMP, and TSH level; to see NP   In the interim, close follow-up with ENT per schedule.    Above discussed with him.  All questions answered.    Discussed labs and scans and gave him copies of relevant records.  He and his wife  understand and agree with this plan.    Follow-up:  No follow-ups on file.    Answers submitted by the patient for this visit:  Review of Systems Questionnaire (Submitted on 1/18/2023)  appetite change : No  unexpected weight change: No  mouth sores: Yes  visual disturbance: No  cough: Yes  shortness of breath: No  chest pain: No  abdominal pain: No  diarrhea: No  frequency: No  back pain: No  rash: No  headaches: No  adenopathy: No  nervous/ anxious: No

## 2023-01-20 NOTE — Clinical Note
Orders for today:  From now on, follow-up with ENT regularly   Follow-up with us in 6 months, with CBC, CMP, and TSH level; to see NP Refer to GI for colonoscopy (Cologuard test is +)

## 2023-01-31 ENCOUNTER — HOSPITAL ENCOUNTER (OUTPATIENT)
Dept: RADIOLOGY | Facility: HOSPITAL | Age: 58
Discharge: HOME | End: 2023-01-31
Attending: STUDENT IN AN ORGANIZED HEALTH CARE EDUCATION/TRAINING PROGRAM
Payer: MEDICAID

## 2023-01-31 DIAGNOSIS — C01 SQUAMOUS CELL CARCINOMA OF BASE OF TONGUE: ICD-10-CM

## 2023-01-31 PROCEDURE — 70491 CT SOFT TISSUE NECK W/DYE: CPT | Mod: TC

## 2023-01-31 PROCEDURE — 25500020 PHARM REV CODE 255

## 2023-01-31 RX ADMIN — IOHEXOL 100 ML: 350 INJECTION, SOLUTION INTRAVENOUS at 09:01

## 2023-02-02 ENCOUNTER — OFFICE VISIT (OUTPATIENT)
Dept: OTOLARYNGOLOGY | Facility: CLINIC | Age: 58
End: 2023-02-02
Payer: MEDICAID

## 2023-02-02 VITALS
WEIGHT: 161 LBS | DIASTOLIC BLOOD PRESSURE: 87 MMHG | SYSTOLIC BLOOD PRESSURE: 129 MMHG | HEART RATE: 78 BPM | BODY MASS INDEX: 23.31 KG/M2

## 2023-02-02 DIAGNOSIS — R49.0 DYSPHONIA: ICD-10-CM

## 2023-02-02 DIAGNOSIS — C01 SQUAMOUS CELL CARCINOMA OF BASE OF TONGUE: Primary | ICD-10-CM

## 2023-02-02 DIAGNOSIS — R13.12 OROPHARYNGEAL DYSPHAGIA: ICD-10-CM

## 2023-02-02 DIAGNOSIS — E03.9 HYPOTHYROIDISM, UNSPECIFIED TYPE: ICD-10-CM

## 2023-02-02 PROCEDURE — 31575 DIAGNOSTIC LARYNGOSCOPY: CPT | Mod: PBBFAC | Performed by: STUDENT IN AN ORGANIZED HEALTH CARE EDUCATION/TRAINING PROGRAM

## 2023-02-02 PROCEDURE — 99214 OFFICE O/P EST MOD 30 MIN: CPT | Mod: PBBFAC,25 | Performed by: STUDENT IN AN ORGANIZED HEALTH CARE EDUCATION/TRAINING PROGRAM

## 2023-02-02 NOTE — PROGRESS NOTES
The scope used for the exam was:  Flexible scope ENF-P4  Serial Number:  1)    [id number]    []   2)    [id number]    []   3)    [id number]    []   4)    [id number]    []   5)    [id number]    [x]   6)    [id number]    []       The scope used for the exam was:  Rigid scope   Serial Number:  1)   [id number]    []   2)   [id number]    []   3)   7330    []   4)    3384   []   5)    0824   []   6)    5554   []     7)   7425    []   8)   2240    []   9)   1109    []

## 2023-02-02 NOTE — PROGRESS NOTES
[facility]  Otolaryngology Clinic Note    Yaron Brennan  Encounter Date: 2/2/2023  YOB: 1965  Physician: Sam Faria    Chief Complaint: Otalgia, neck mass    HPI: Yaron Brennan is a 57 y.o. male with HTN who presents as referral from Dr. Welsh for squamous cell carcinoma of base of tongue. Patient reports that toward the beginning of the year he started to have worsening left otalgia. He then noticed a bump/swelling on left neck that he thought was was due to a bug bite. This got progressively bigger over time and he then noticed some swelling on right neck as well. He eventually presented to [facility] in early July 2022. He had a CT neck that showed an ulcerated mass in the tongue base, and underwent direct laryngoscopy with ENT on 7/6/22 (Dr. Tay). Operative findings: ulcerated friable mass base of the tongue that seems confined to base of tongue; lingual surface of epiglottis is free from tumor; mass involves bilateral base of tongue, significantly midline. Results returned positive for p16+ SCCa. Today in clinic patient reports bilateral L>R otalgia, mild dysphagia/odynophagia. He reports he eats basically whatever he wants without overt signs of aspiration. He does report 40lb weight loss in the last 6 months. + Neck LAD. Reports voice changes. Denies any issues breathing. Smoked 1ppd since 17 years old (42 years) but quit a month ago when he got his diagnosis. Also previously drank 12 pack of beer a day and likewise quite a month ago. Referred to ENT for consideration of possible surgical resection.     07/05/2022: CT soft tissue of the neck without contrast:  -ulcerated tongue base mass, at least 3.4 cm by 3.0 cm x 3.1 cm; associated bulky level 2 and level 3 cervical lymphadenopathy; suspected level 1 and level 4 cervical lymphadenopathy; a right level 2 lymph node with central necrosis 2.6 x 3.3 cm; a left level 3 lymph node 4.5 x 3.2 cm; no  significant airway compromise; no acute or aggressive bony lesions  -malignant ulcerated type mass with lymph node metastasis; no airway compromise; right upper lung lobe indistinct ground-glass pulmonary nodules typical of benign infectious or inflammatory etiology    8/16/22: Here today for follow up. Had dental extractions performed on 8/5. Not having any issues. Taking in 4-5 ensures daily. Trying to take some other soft foods like grits and pudding.     9/22/22: Here for follow up. Patient is undergoing CRT and tolerating it well. He reports he has one more chemotherapy session left and about 2-3 weeks of radiation left. Overall maintaining his weight. No progressive dysphagia, swallowing fine. Also no breathing issues or other complaints.    11/3/22: Patient is here today for follow up.  He finished CRT on 10/10.  Patient states he is still in some pain in  his neck and inside of his mouth.   He is tolerating p.o. intake but states at times he does have difficulty swallowing.  He does have xerostomia.  Patient has not used his eardrops to loosen the cerumen impaction.  Denies any shortness of breath or difficulty breathing/ changes to voice,  changes to his neck masses.    12/7/22:  Patient returns for surveillance today.  Patient denies any new dysphagia, odynophagia, H&N lumps or bumps, otalgia, weight loss.  Overall pain is getting better, tolerating diet, gaining weight.    ROS:   General: Negative except per HPI  Skin: Denies rash, ulcer, or lesion.  Eyes: Denies vision changes or diplopia.  Ears: Negative except per HPI  Nose: Negative except per HPI  Throat/mouth: Negative except per HPI  Cardiovascular: Negative except per HPI  Respiratory: Negative except per HPI  Neck: Negative except per HPI  Endocrine: Negative except per HPI  Neurologic: Negative except per HPI    Other 10-point review of systems negative except per HPI      Review of patient's allergies indicates:  No Known Allergies    Past  Medical History:   Diagnosis Date    Cancer     Hypertension        Past Surgical History:   Procedure Laterality Date    BACK SURGERY      DIRECT LARYNGOSCOPY N/A 2022    Procedure: LARYNGOSCOPY, DIRECT;  Surgeon: Mekhi Grossman MD;  Location: [facility] OR;  Service: ENT;  Laterality: N/A;    EXTRACTION OF TOOTH N/A 2022    Procedure: EXTRACTION, TEETH;  Surgeon: Mekhi Grossman MD;  Location: [facility];  Service: ENT;  Laterality: N/A;    SPINE SURGERY      Not sure of date maybe in        Social History     Socioeconomic History    Marital status:      Spouse name: Blanca   Occupational History    Occupation: Disabled   Tobacco Use    Smoking status: Former     Types: Cigarettes     Start date:      Quit date: 2022     Years since quittin.5    Smokeless tobacco: Never    Tobacco comments:     Been almost 35 yr- Using Nicotine patches- denies smoking at current time   Substance and Sexual Activity    Alcohol use: Not Currently    Drug use: Yes     Types: Marijuana     Comment: Daily    Sexual activity: Yes     Partners: Female     Birth control/protection: None     Social Determinants of Health     Transportation Needs: No Transportation Needs    Lack of Transportation (Medical): No    Lack of Transportation (Non-Medical): No   Housing Stability: High Risk    Unable to Pay for Housing in the Last Year: Yes    Number of Places Lived in the Last Year: 2    Unstable Housing in the Last Year: No       Family History   Problem Relation Age of Onset    Hypertension Mother     Cancer Father     Hypertension Sister     Hypertension Brother        Outpatient Encounter Medications as of 2023   Medication Sig Dispense Refill    amLODIPine (NORVASC) 10 MG tablet Take 1 tablet (10 mg total) by mouth once daily. 90 tablet 1    gabapentin (NEURONTIN) 300 MG capsule Take 300 mg by mouth 3 (three) times daily.      HYDROcodone-acetaminophen (NORCO) 5-325 mg per tablet Take 1 tablet by  mouth every 6 (six) hours as needed.      hydrocortisone (PROCTOCORT) 10 % (80 mg) rectal foam Place 1 applicator rectally 2 (two) times daily as needed (hemorrhoidal flare). 15 g 1    LIDOcaine HCl 2% (XYLOCAINE) 2 % Soln Swish and spit with 15 mL every 6 hours as needed. Do not swallow. 100 mL 0    mometasone (ELOCON) 0.1 % ointment Apply topically 2 (two) times daily.      nicotine (NICODERM CQ) 21 mg/24 hr 1 patch once daily.      ondansetron (ZOFRAN-ODT) 8 MG TbDL Take 1 tablet (8 mg total) by mouth every 8 (eight) hours as needed (nausea). 90 tablet 3    rivaroxaban (XARELTO) 20 mg Tab Start taking 1 tablet once daily by mouth with evening meal 30 tablet 1    SPS, WITH SORBITOL, 15-20 gram/60 mL Susp Take by mouth.       Facility-Administered Encounter Medications as of 2/2/2023   Medication Dose Route Frequency Provider Last Rate Last Admin    lactated ringers infusion   Intravenous Continuous Elzbieta Quesada MD 10 mL/hr at 08/05/22 0808 New Bag at 08/05/22 0808    LIDOcaine (PF) 10 mg/ml (1%) injection 10 mg  1 mL Intradermal Once SANDY Levy        LIDOcaine (PF) 40 mg/mL (4 %) injection 2 mL  2 mL Other 1 time in Clinic/HOD Tony Pepe MD        phenylephrine HCL 1% nasal spray 1 spray  1 spray Each Nostril 1 time in Clinic/HOD Tony Pepe MD        sodium chloride 0.9% flush 10 mL  10 mL Intravenous PRN Anna Taylor MD           Physical Exam:  Vitals:    02/02/23 0959   BP: 129/87   Pulse: 78   Weight: 73 kg (161 lb)       Constitutional  General Appearance: well nourished, well-developed, AAO x3, NAD; voice muffled/hot potato voice  HEENT  Eyes: PEERLA, EOMI, normal conjunctivae  Ears: Hearing well at conversation level;    AD: auricle normal,cerumen impaction   AS: auricle normal, cerumen impaction   Vestibular: ambulates without gait disturbance  Nose: septum midline, no inferior turbinate hypertrophy, no polyps, moist mucosa without erythema or blue hue  OC/OP: dentition  poor, no oral lesions,  unable to fully palpate tongue/FOM/BOT due to very bad gag reflex- soft, no leukoplakia/ulcerations/ tenderness, good protrusion of tongue  Nasopharynx, Hypopharynx, and Larynx:    Indirect: attempted, limited view due to patient intolerance  Neck: soft neck bilaterally, no discrete lymph nodes appreciated though previous left level III and right level II nodes feel somewhat matted.   Postradiation hyperpigmentation to the anterior neck.  Neuro: CN II - XII intact bilaterally  Cardiovascular: peripheral pulses palpable  Respiratory: non-labored respirations  Psychiatric: oriented to time, place and person, no depression, anxiety or agitation    Flexible Fiberoptic Laryngoscopy/Nasopharyngoscopy via right nare  Anesthesia: none   Adverse Events: None  Resident Surgeon: Sam Faria MD  Blood loss: none  Condition: good    Procedure in Detail: Informed consent was obtained from the patient after explanation of procedure, indications, risks and benefits. Flexible endoscopy was performed on Yaron Brennan through the right nasal passages. The nasal cavity, nasopharynx, oropharynx, hypopharynx and larynx were adequately visualized. The true vocal cords and arytenoids were examined during phonation and repose.    Findings:  NP/OP: no masses/lesions of NC, eustachian tube, fossa of Rosenmuller, no adenoid hypertrophy  BOT/vallecula:   moderate edema, no signs of ulcerations or lesions, edema of bilateral lateral pharyngeal walls with lateral and A-P collapse  Piriform sinuses/post-cricoid: no masses/lesions, moderate pooling of secretions  Epiglottis: lingual and laryngeal surfaces within normal limits with radiation effect and curling of the epiglottis  Arytenoids/FVFs: no masses/lesions, significant radiation edema, bilateral movement intact, thick secretions in larynx  TVCs: bilateral cord mobility, no masses or lesions  No aspiration, thick secretions in post cricoid area, hypopharynx, and into  the FVF level, on aspiration noted    Patient does have postradiation changes throughout the larynx.      Pertinent Data:  ? LABS:  ? AUDIO:  ? CT Scan: CT neck 1/31 and PET 1/11 2023 reviewed independently today    Assessment/Plan:  Yaron Brennan is a 57 y.o. 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 CRT, finished 10/10/22.   Currently doing well  following his CRT.  PET 1/11/23 without disease (in [facility] system), improving primary and LAD size on CT neck 1/31/23    - obtain TSH today  - refer to SLP  - Patient will return to clinic in 4-6 weeks month for cancer surveillance and scope exam    Sam Faria MD  [facility] Otolaryngology PGY IV        HEAD & NECK CANCER SURVEILLANCE   Radiation and/or Chemotherapy     Medical Oncologist: Kevon Welsh MD  Radiation Oncologist: Unknown    Primary tumor: cT3 cN2 M0 p16+ base of tongue    Date of Diagnosis: 7/6/22  Therapy: Cisplatin 100 mg/m² IV days 1, 22, and 23, concurrent with RT  Completion Date: 10/10/22    Pertinent Treatment History:   first presentation 7/2022  CT neck 7/5/22 & 7/29/22  Panendoscopy 7/6/22  Right UE DVT 2/2 PICC 9/15/22 on Xarelto  CRT 8/25/22 - 10/10/22      Place date if completed   3 6 12 18 24 36 48 60   CT Neck 1/31/23 X X X X X X X   PET/CT 1/11/23          CXR  X X X X X X X   TFT X  X  X X X X     Current Surveillance Interval:  <1 year; every 4-6 weeks

## 2023-03-06 PROBLEM — Z00.00 WELLNESS EXAMINATION: Status: RESOLVED | Noted: 2022-12-01 | Resolved: 2023-03-06

## 2023-03-14 ENCOUNTER — OFFICE VISIT (OUTPATIENT)
Dept: OTOLARYNGOLOGY | Facility: CLINIC | Age: 58
End: 2023-03-14
Payer: MEDICAID

## 2023-03-14 VITALS
WEIGHT: 162 LBS | BODY MASS INDEX: 23.46 KG/M2 | DIASTOLIC BLOOD PRESSURE: 76 MMHG | HEART RATE: 73 BPM | SYSTOLIC BLOOD PRESSURE: 136 MMHG

## 2023-03-14 DIAGNOSIS — C01 SQUAMOUS CELL CARCINOMA OF BASE OF TONGUE: Primary | ICD-10-CM

## 2023-03-14 PROCEDURE — 31575 DIAGNOSTIC LARYNGOSCOPY: CPT | Mod: PBBFAC | Performed by: OTOLARYNGOLOGY

## 2023-03-14 PROCEDURE — 99214 OFFICE O/P EST MOD 30 MIN: CPT | Mod: PBBFAC,25 | Performed by: OTOLARYNGOLOGY

## 2023-03-14 NOTE — PROGRESS NOTES
[facility]  Otolaryngology Clinic Note    Yaron Brennan  Encounter Date: 3/14/2023  YOB: 1965  Physician: Addison Chandra    Chief Complaint: Otalgia, neck mass    HPI: Yaron Brennan is a 57 y.o. male with HTN who presents as referral from Dr. Welsh for squamous cell carcinoma of base of tongue. Patient reports that toward the beginning of the year he started to have worsening left otalgia. He then noticed a bump/swelling on left neck that he thought was was due to a bug bite. This got progressively bigger over time and he then noticed some swelling on right neck as well. He eventually presented to [facility] in early July 2022. He had a CT neck that showed an ulcerated mass in the tongue base, and underwent direct laryngoscopy with ENT on 7/6/22 (Dr. Tay). Operative findings: ulcerated friable mass base of the tongue that seems confined to base of tongue; lingual surface of epiglottis is free from tumor; mass involves bilateral base of tongue, significantly midline. Results returned positive for p16+ SCCa. Today in clinic patient reports bilateral L>R otalgia, mild dysphagia/odynophagia. He reports he eats basically whatever he wants without overt signs of aspiration. He does report 40lb weight loss in the last 6 months. + Neck LAD. Reports voice changes. Denies any issues breathing. Smoked 1ppd since 17 years old (42 years) but quit a month ago when he got his diagnosis. Also previously drank 12 pack of beer a day and likewise quite a month ago. Referred to ENT for consideration of possible surgical resection.     07/05/2022: CT soft tissue of the neck without contrast:  -ulcerated tongue base mass, at least 3.4 cm by 3.0 cm x 3.1 cm; associated bulky level 2 and level 3 cervical lymphadenopathy; suspected level 1 and level 4 cervical lymphadenopathy; a right level 2 lymph node with central necrosis 2.6 x 3.3 cm; a left level 3 lymph node 4.5 x 3.2 cm; no  significant airway compromise; no acute or aggressive bony lesions  -malignant ulcerated type mass with lymph node metastasis; no airway compromise; right upper lung lobe indistinct ground-glass pulmonary nodules typical of benign infectious or inflammatory etiology    8/16/22: Here today for follow up. Had dental extractions performed on 8/5. Not having any issues. Taking in 4-5 ensures daily. Trying to take some other soft foods like grits and pudding.     9/22/22: Here for follow up. Patient is undergoing CRT and tolerating it well. He reports he has one more chemotherapy session left and about 2-3 weeks of radiation left. Overall maintaining his weight. No progressive dysphagia, swallowing fine. Also no breathing issues or other complaints.    11/3/22: Patient is here today for follow up.  He finished CRT on 10/10.  Patient states he is still in some pain in  his neck and inside of his mouth.   He is tolerating p.o. intake but states at times he does have difficulty swallowing.  He does have xerostomia.  Patient has not used his eardrops to loosen the cerumen impaction.  Denies any shortness of breath or difficulty breathing/ changes to voice,  changes to his neck masses.    12/7/22:  Patient returns for surveillance today.  Patient denies any new dysphagia, odynophagia, H&N lumps or bumps, otalgia, weight loss.  Overall pain is getting better, tolerating diet, gaining weight.    03/14/2023 doing well, no pain, no otalgia, dysphagia has improved significantly, weight is stable.  No hemoptysis.  Voice remains hoarse.  No airway distress.    ROS:   General: Negative except per HPI  Skin: Denies rash, ulcer, or lesion.  Eyes: Denies vision changes or diplopia.  Ears: Negative except per HPI  Nose: Negative except per HPI  Throat/mouth: Negative except per HPI  Cardiovascular: Negative except per HPI  Respiratory: Negative except per HPI  Neck: Negative except per HPI  Endocrine: Negative except per HPI  Neurologic:  Negative except per HPI    Other 10-point review of systems negative except per HPI      Review of patient's allergies indicates:  No Known Allergies    Past Medical History:   Diagnosis Date    Cancer     Hypertension        Past Surgical History:   Procedure Laterality Date    BACK SURGERY      DIRECT LARYNGOSCOPY N/A 2022    Procedure: LARYNGOSCOPY, DIRECT;  Surgeon: Mekhi Grossman MD;  Location: [facility];  Service: ENT;  Laterality: N/A;    EXTRACTION OF TOOTH N/A 2022    Procedure: EXTRACTION, TEETH;  Surgeon: Mekhi Grossman MD;  Location: [facility];  Service: ENT;  Laterality: N/A;    SPINE SURGERY      Not sure of date maybe in        Social History     Socioeconomic History    Marital status:      Spouse name: Blanca   Occupational History    Occupation: Disabled   Tobacco Use    Smoking status: Former     Types: Cigarettes     Start date:      Quit date: 2022     Years since quittin.6    Smokeless tobacco: Never    Tobacco comments:     Been almost 35 yr- Using Nicotine patches- denies smoking at current time   Substance and Sexual Activity    Alcohol use: Not Currently    Drug use: Yes     Types: Marijuana     Comment: Daily    Sexual activity: Yes     Partners: Female     Birth control/protection: None     Social Determinants of Health     Transportation Needs: No Transportation Needs    Lack of Transportation (Medical): No    Lack of Transportation (Non-Medical): No   Housing Stability: High Risk    Unable to Pay for Housing in the Last Year: Yes    Number of Places Lived in the Last Year: 2    Unstable Housing in the Last Year: No       Family History   Problem Relation Age of Onset    Hypertension Mother     Cancer Father     Hypertension Sister     Hypertension Brother        Outpatient Encounter Medications as of 3/14/2023   Medication Sig Dispense Refill    amLODIPine (NORVASC) 10 MG tablet Take 1 tablet (10 mg total) by mouth once daily. 90 tablet 1     gabapentin (NEURONTIN) 300 MG capsule Take 300 mg by mouth 3 (three) times daily.      HYDROcodone-acetaminophen (NORCO) 5-325 mg per tablet Take 1 tablet by mouth every 6 (six) hours as needed.      hydrocortisone (PROCTOCORT) 10 % (80 mg) rectal foam Place 1 applicator rectally 2 (two) times daily as needed (hemorrhoidal flare). 15 g 1    LIDOcaine HCl 2% (XYLOCAINE) 2 % Soln Swish and spit with 15 mL every 6 hours as needed. Do not swallow. 100 mL 0    mometasone (ELOCON) 0.1 % ointment Apply topically 2 (two) times daily.      nicotine (NICODERM CQ) 21 mg/24 hr 1 patch once daily.      ondansetron (ZOFRAN-ODT) 8 MG TbDL Take 1 tablet (8 mg total) by mouth every 8 (eight) hours as needed (nausea). 90 tablet 3    rivaroxaban (XARELTO) 20 mg Tab Start taking 1 tablet once daily by mouth with evening meal 30 tablet 1    SPS, WITH SORBITOL, 15-20 gram/60 mL Susp Take by mouth.       Facility-Administered Encounter Medications as of 3/14/2023   Medication Dose Route Frequency Provider Last Rate Last Admin    lactated ringers infusion   Intravenous Continuous Elzbieta Quesada MD 10 mL/hr at 08/05/22 0808 New Bag at 08/05/22 0808    LIDOcaine (PF) 10 mg/ml (1%) injection 10 mg  1 mL Intradermal Once SANDY Levy        LIDOcaine (PF) 40 mg/mL (4 %) injection 2 mL  2 mL Other 1 time in Clinic/HOD Tony Pepe MD        phenylephrine HCL 1% nasal spray 1 spray  1 spray Each Nostril 1 time in Clinic/HOD Tony Pepe MD        sodium chloride 0.9% flush 10 mL  10 mL Intravenous PRN Anna Taylor MD           Physical Exam:  Vitals:    03/14/23 1444   BP: 136/76   Pulse: 73   Weight: 73.5 kg (162 lb)       Constitutional  General Appearance: well nourished, well-developed, AAO x3, NAD; voice muffled/hot potato voice  HEENT  Eyes: PEERLA, EOMI, normal conjunctivae  Ears: Hearing well at conversation level;    AD: auricle normal,cerumen impaction   AS: auricle normal, cerumen impaction   Vestibular:  ambulates without gait disturbance  Nose: septum midline, no inferior turbinate hypertrophy, no polyps, moist mucosa without erythema or blue hue  OC/OP: dentition poor, no oral lesions,  unable to fully palpate tongue/FOM/BOT due to very bad gag reflex- soft, no leukoplakia/ulcerations/ tenderness, good protrusion of tongue  Nasopharynx, Hypopharynx, and Larynx:    Indirect: attempted, limited view due to patient intolerance  Neck: soft neck bilaterally, no discrete lymph nodes appreciated though previous left level III and right level II nodes. Postradiation hyperpigmentation to the anterior neck along with radiation induced edema  Neuro: CN II - XII intact bilaterally  Cardiovascular: peripheral pulses palpable  Respiratory: non-labored respirations  Psychiatric: oriented to time, place and person, no depression, anxiety or agitation    Flexible Fiberoptic Laryngoscopy/Nasopharyngoscopy via right nare  Anesthesia: none   Adverse Events: None  Resident Surgeon: Addison Chandra MD  Blood loss: none  Condition: good    Procedure in Detail: Informed consent was obtained from the patient after explanation of procedure, indications, risks and benefits. Flexible endoscopy was performed on Yaron Brennan through the right nasal passages. The nasal cavity, nasopharynx, oropharynx, hypopharynx and larynx were adequately visualized. The true vocal cords and arytenoids were examined during phonation and repose.    Findings:  NP/OP: no masses/lesions of NC, eustachian tube, fossa of Rosenmuller, no adenoid hypertrophy  BOT/vallecula:   moderate edema, no signs of ulcerations or lesions, edema of bilateral lateral pharyngeal walls with lateral and A-P collapse  Piriform sinuses/post-cricoid: no masses/lesions, moderate pooling of secretions  Epiglottis: lingual and laryngeal surfaces within normal limits with radiation effect and curling of the epiglottis  Arytenoids/FVFs: no masses/lesions, significant radiation edema,  bilateral movement intact, thick secretions in larynx, small right edematous appearing laryngeal polyp on the false cord, watery edema  TVCs: bilateral cord mobility, no masses or lesions  No aspiration, thick secretions in post cricoid area, hypopharynx, and into the FVF level, on aspiration noted    Patient does have postradiation changes throughout the larynx.      Pertinent Data:  ? LABS:  ? AUDIO:  ? CT Scan: CT neck 1/31 and PET 1/11 2023 reviewed independently today    Assessment/Plan:  Yaron Brennan is a 57 y.o. 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 CRT, finished 10/10/22.   Currently doing well  following his CRT.  PET 1/11/23 without disease (in [facility] system), improving primary and LAD size on CT neck 1/31/23    - obtain TSH today  - refer to SLP  - Patient will return to clinic in 4-6 weeks month for cancer surveillance and scope exam    Addison Chandra MD        HEAD & NECK CANCER SURVEILLANCE   Radiation and/or Chemotherapy     Medical Oncologist: Kevon Welsh MD  Radiation Oncologist: Unknown    Primary tumor: cT3 cN2 M0 p16+ base of tongue    Date of Diagnosis: 7/6/22  Therapy: Cisplatin 100 mg/m² IV days 1, 22, and 23, concurrent with RT  Completion Date: 10/10/22    Pertinent Treatment History:   first presentation 7/2022  CT neck 7/5/22 & 7/29/22  Panendoscopy 7/6/22  Right UE DVT 2/2 PICC 9/15/22 on Xarelto  CRT 8/25/22 - 10/10/22      Place date if completed   3 6 12 18 24 36 48 60   CT Neck 1/31/23 X X X X X X X   PET/CT 1/11/23          CXR  X X X X X X X   TFT X  X  X X X X     Current Surveillance Interval:  <1 year; every 4-6 weeks

## 2023-04-11 ENCOUNTER — OFFICE VISIT (OUTPATIENT)
Dept: OTOLARYNGOLOGY | Facility: CLINIC | Age: 58
End: 2023-04-11
Payer: MEDICAID

## 2023-04-11 VITALS
SYSTOLIC BLOOD PRESSURE: 139 MMHG | DIASTOLIC BLOOD PRESSURE: 87 MMHG | HEART RATE: 76 BPM | WEIGHT: 161 LBS | BODY MASS INDEX: 23.31 KG/M2

## 2023-04-11 DIAGNOSIS — Z85.819 HISTORY OF OROPHARYNGEAL CANCER: Primary | ICD-10-CM

## 2023-04-11 DIAGNOSIS — Z92.3 HISTORY OF RADIATION TO HEAD AND NECK REGION: ICD-10-CM

## 2023-04-11 DIAGNOSIS — H61.23 BILATERAL IMPACTED CERUMEN: ICD-10-CM

## 2023-04-11 PROCEDURE — 69210 REMOVE IMPACTED EAR WAX UNI: CPT | Mod: 50,PBBFAC | Performed by: STUDENT IN AN ORGANIZED HEALTH CARE EDUCATION/TRAINING PROGRAM

## 2023-04-11 PROCEDURE — 99213 OFFICE O/P EST LOW 20 MIN: CPT | Mod: PBBFAC | Performed by: STUDENT IN AN ORGANIZED HEALTH CARE EDUCATION/TRAINING PROGRAM

## 2023-04-11 PROCEDURE — 31575 DIAGNOSTIC LARYNGOSCOPY: CPT | Mod: PBBFAC | Performed by: STUDENT IN AN ORGANIZED HEALTH CARE EDUCATION/TRAINING PROGRAM

## 2023-04-11 NOTE — PROGRESS NOTES
[facility]  Otolaryngology Clinic Note    Yaron Brennan  Encounter Date: 4/11/2023  YOB: 1965  Physician: Myra Szymanski    Chief Complaint: Otalgia, neck mass    HPI: Yaron Brennan is a 57 y.o. male with HTN who presents as referral from Dr. Welsh for squamous cell carcinoma of base of tongue. Patient reports that toward the beginning of the year he started to have worsening left otalgia. He then noticed a bump/swelling on left neck that he thought was was due to a bug bite. This got progressively bigger over time and he then noticed some swelling on right neck as well. He eventually presented to [facility] in early July 2022. He had a CT neck that showed an ulcerated mass in the tongue base, and underwent direct laryngoscopy with ENT on 7/6/22 (Dr. Tay). Operative findings: ulcerated friable mass base of the tongue that seems confined to base of tongue; lingual surface of epiglottis is free from tumor; mass involves bilateral base of tongue, significantly midline. Results returned positive for p16+ SCCa. Today in clinic patient reports bilateral L>R otalgia, mild dysphagia/odynophagia. He reports he eats basically whatever he wants without overt signs of aspiration. He does report 40lb weight loss in the last 6 months. + Neck LAD. Reports voice changes. Denies any issues breathing. Smoked 1ppd since 17 years old (42 years) but quit a month ago when he got his diagnosis. Also previously drank 12 pack of beer a day and likewise quite a month ago. Referred to ENT for consideration of possible surgical resection.     07/05/2022: CT soft tissue of the neck without contrast:  -ulcerated tongue base mass, at least 3.4 cm by 3.0 cm x 3.1 cm; associated bulky level 2 and level 3 cervical lymphadenopathy; suspected level 1 and level 4 cervical lymphadenopathy; a right level 2 lymph node with central necrosis 2.6 x 3.3 cm; a left level 3 lymph node 4.5 x 3.2 cm; no  significant airway compromise; no acute or aggressive bony lesions  -malignant ulcerated type mass with lymph node metastasis; no airway compromise; right upper lung lobe indistinct ground-glass pulmonary nodules typical of benign infectious or inflammatory etiology    8/16/22: Here today for follow up. Had dental extractions performed on 8/5. Not having any issues. Taking in 4-5 ensures daily. Trying to take some other soft foods like grits and pudding.     9/22/22: Here for follow up. Patient is undergoing CRT and tolerating it well. He reports he has one more chemotherapy session left and about 2-3 weeks of radiation left. Overall maintaining his weight. No progressive dysphagia, swallowing fine. Also no breathing issues or other complaints.    11/3/22: Patient is here today for follow up.  He finished CRT on 10/10.  Patient states he is still in some pain in  his neck and inside of his mouth.   He is tolerating p.o. intake but states at times he does have difficulty swallowing.  He does have xerostomia.  Patient has not used his eardrops to loosen the cerumen impaction.  Denies any shortness of breath or difficulty breathing/ changes to voice,  changes to his neck masses.    12/7/22:  Patient returns for surveillance today.  Patient denies any new dysphagia, odynophagia, H&N lumps or bumps, otalgia, weight loss.  Overall pain is getting better, tolerating diet, gaining weight.    03/14/2023 doing well, no pain, no otalgia, dysphagia has improved significantly, weight is stable.  No hemoptysis.  Voice remains hoarse.  No airway distress.    4/11/2023: Returns today for follow-up. Reports doing well overall. No dysphagia, swallowing all foods without issue. Voice stable. No odynophagia, no SOB, no otalgia. Does note feeling like ears are clogged, has tried ear drops without success. Did get TSH since last visit, wnl.      Review of patient's allergies indicates:  No Known Allergies    Past Medical History:    Diagnosis Date    Cancer     Hypertension        Past Surgical History:   Procedure Laterality Date    BACK SURGERY      DIRECT LARYNGOSCOPY N/A 2022    Procedure: LARYNGOSCOPY, DIRECT;  Surgeon: Mekhi Grossman MD;  Location: [facility] OR;  Service: ENT;  Laterality: N/A;    EXTRACTION OF TOOTH N/A 2022    Procedure: EXTRACTION, TEETH;  Surgeon: Mekhi Grossman MD;  Location: [facility];  Service: ENT;  Laterality: N/A;    SPINE SURGERY      Not sure of date maybe in        Social History     Socioeconomic History    Marital status:      Spouse name: Blanca   Occupational History    Occupation: Disabled   Tobacco Use    Smoking status: Former     Types: Cigarettes     Start date:      Quit date: 2022     Years since quittin.7    Smokeless tobacco: Never    Tobacco comments:     Been almost 35 yr- Using Nicotine patches- denies smoking at current time   Substance and Sexual Activity    Alcohol use: Not Currently    Drug use: Yes     Types: Marijuana     Comment: Daily    Sexual activity: Yes     Partners: Female     Birth control/protection: None     Social Determinants of Health     Transportation Needs: No Transportation Needs    Lack of Transportation (Medical): No    Lack of Transportation (Non-Medical): No   Housing Stability: High Risk    Unable to Pay for Housing in the Last Year: Yes    Number of Places Lived in the Last Year: 2    Unstable Housing in the Last Year: No       Family History   Problem Relation Age of Onset    Hypertension Mother     Cancer Father     Hypertension Sister     Hypertension Brother        Outpatient Encounter Medications as of 2023   Medication Sig Dispense Refill    amLODIPine (NORVASC) 10 MG tablet Take 1 tablet (10 mg total) by mouth once daily. 90 tablet 1    gabapentin (NEURONTIN) 300 MG capsule Take 300 mg by mouth 3 (three) times daily.      HYDROcodone-acetaminophen (NORCO) 5-325 mg per tablet Take 1 tablet by mouth every 6 (six)  hours as needed.      hydrocortisone (PROCTOCORT) 10 % (80 mg) rectal foam Place 1 applicator rectally 2 (two) times daily as needed (hemorrhoidal flare). 15 g 1    LIDOcaine HCl 2% (XYLOCAINE) 2 % Soln Swish and spit with 15 mL every 6 hours as needed. Do not swallow. 100 mL 0    mometasone (ELOCON) 0.1 % ointment Apply topically 2 (two) times daily.      nicotine (NICODERM CQ) 21 mg/24 hr 1 patch once daily.      ondansetron (ZOFRAN-ODT) 8 MG TbDL Take 1 tablet (8 mg total) by mouth every 8 (eight) hours as needed (nausea). 90 tablet 3    rivaroxaban (XARELTO) 20 mg Tab Start taking 1 tablet once daily by mouth with evening meal 30 tablet 1    SPS, WITH SORBITOL, 15-20 gram/60 mL Susp Take by mouth.       Facility-Administered Encounter Medications as of 4/11/2023   Medication Dose Route Frequency Provider Last Rate Last Admin    lactated ringers infusion   Intravenous Continuous Elzbieta Quesada MD 10 mL/hr at 08/05/22 0808 New Bag at 08/05/22 0808    LIDOcaine (PF) 10 mg/ml (1%) injection 10 mg  1 mL Intradermal Once SANDY Levy        LIDOcaine (PF) 40 mg/mL (4 %) injection 2 mL  2 mL Other 1 time in Clinic/HOD Tony Pepe MD        phenylephrine HCL 1% nasal spray 1 spray  1 spray Each Nostril 1 time in Clinic/HOD Tony Pepe MD        sodium chloride 0.9% flush 10 mL  10 mL Intravenous PRN Anna Taylor MD           Physical Exam:  Vitals:    04/11/23 1207   BP: 139/87   Pulse: 76   Weight: 73 kg (161 lb)       Constitutional  General Appearance: well nourished, well-developed, AAO x3, NAD; voice slightly rough  HEENT  Eyes: PEERLA, EOMI, normal conjunctivae  Ears: Hearing well at conversation level;    AD: auricle normal,cerumen impaction   AS: auricle normal, cerumen impaction  Nose: septum midline, no inferior turbinate hypertrophy, no polyps, moist mucosa without erythema or blue hue  OC/OP: edentulous,  no oral lesions,  unable to fully palpate tongue/FOM/BOT due to very bad  gag reflex- soft, no leukoplakia/ulcerations/ tenderness, good protrusion of tongue  Nasopharynx, Hypopharynx, and Larynx:    Indirect: attempted, limited view due to patient intolerance  Neck: soft neck bilaterally, no discrete lymph nodes appreciated. Postradiation induced edema  Neuro: CN II - XII intact bilaterally  Respiratory: non-labored respirations on RA  Psychiatric: oriented to time, place and person, no depression, anxiety or agitation    Flexible Fiberoptic Laryngoscopy/Nasopharyngoscopy via right nare  Anesthesia: none   Adverse Events: None  Resident Surgeon: Myra Szymanski MD  Blood loss: none  Condition: good    Procedure in Detail: Informed consent was obtained from the patient after explanation of procedure, indications, risks and benefits. Flexible endoscopy was performed on Yaron Brennan through the right nasal passages. The nasal cavity, nasopharynx, oropharynx, hypopharynx and larynx were adequately visualized. The true vocal cords and arytenoids were examined during phonation and repose.    Findings:  NP/OP: no masses/lesions of NC, eustachian tube, fossa of Rosenmuller, no adenoid hypertrophy  BOT/vallecula:   moderate edema, no signs of ulcerations or lesions, edema of bilateral lateral pharyngeal walls with lateral and A-P collapse  Piriform sinuses/post-cricoid: limited view secondary to pooling of secretions and post XRT edema  Epiglottis: lingual and laryngeal surfaces within normal limits with radiation effect and curling of the epiglottis  Arytenoids/FVFs: no masses/lesions, significant post radiation edema, bilateral movement intact, thick secretions in larynx, small right edematous appearing laryngeal polyp on the false cord, watery edema  TVCs: bilateral cord mobility, no masses or lesions  No aspiration, thick secretions; no aspiration noted    Patient does have postradiation changes throughout the larynx.    Procedure: Cerumen removal bilaterally  Detail: After verbal  informed consent was obtained, patient was positioned in procedure chair under operative microscope. Curette was used to remove complete cerumen impaction in patients left ear after placement of speculum. EAC was without lesions, TM was intact without evidence of middle ear fluid. Similar procedure was repeated on patient's right side. EAC was without lesions, TM was intact without evidence of middle ear fluid. Patient tolerated procedure well without complications      Pertinent Data:  ? LABS: 2/2/23:  Thyroid Stimulating Hormone 0.350 - 4.940 uIU/mL 3.904      ? CT Scan: CT neck 1/31 and   PET  01/11/2023:  Restaging PET-CT (comparison:  Whole-body PET-CT 08/18/2022):  No residual primary tongue malignancy or cervical metastatic lymphadenopathy and no new metastatic disease identified at the neck or distantly    Assessment/Plan:  Yaron Brennan is a 57 y.o. 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 CRT, finished 10/10/22.   Currently doing well  following his CRT.  PET 1/11/23 without disease (in [facility] system), improving primary and LAD size on CT neck 1/31/23. TSH wnl 2/2/23. Also with cerumen impaction s/p removal in clinic today    - No q-tips, mineral oil to bilateral ears once weekly to avoid future buildup  - Patient will return to clinic in 6 weeks for cancer surveillance and scope exam    Myra Szymanski MD  Otolaryngology PGYV          HEAD & NECK CANCER SURVEILLANCE   Radiation and/or Chemotherapy     Medical Oncologist: Kevon Welsh MD  Radiation Oncologist: Unknown    Primary tumor: cT3 cN2 M0 p16+ base of tongue    Date of Diagnosis: 7/6/22  Therapy: Cisplatin 100 mg/m² IV days 1, 22, and 23, concurrent with RT  Completion Date: 10/10/22    Pertinent Treatment History:   first presentation 7/2022  CT neck 7/5/22 & 7/29/22  Panendoscopy 7/6/22  Right UE DVT 2/2 PICC 9/15/22 on Xarelto  CRT 8/25/22 -  10/10/22      Place date if completed   3 6 12 18 24 36 48 60   CT Neck 1/31/23 X X X X X X X   PET/CT 1/11/23          CXR  X X X X X X X   TFT X  X  X X X X     Current Surveillance Interval:  <1 year; every 4-6 weeks

## 2023-04-11 NOTE — PROGRESS NOTES
The scope used for the exam was:  Flexible scope ENF-P4  Serial Number:  1)    [id number]    []   2)    [id number]    []   3)    [id number]    []   4)    [id number]    [x]   5)    [id number]    []   6)    [id number]    []       The scope used for the exam was:  Rigid scope   Serial Number:  1)   [id number]    []   2)   [id number]    []   3)   7330    []   4)    3384   []   5)    0824   []   6)    5554   []     7)   7425    []   8)   2240    []   9)   1109    []

## 2023-05-04 NOTE — ANESTHESIA PROCEDURE NOTES
Intubation    Date/Time: 8/5/2022 7:21 AM  Performed by: Bernard Sethi CRNA  Authorized by: Elzbieta Quesada MD     Intubation:     Induction:  Intravenous    Intubated:  Postinduction    Mask Ventilation:  Easy mask    Attempts:  More than 3    Attempted By:  CRNA    Method of Intubation:  Direct and video laryngoscopy    Blade:  Glidescope 4    Laryngeal View Grade: Grade I - full view of cords      Attempted By (2nd Attempt):  CRNA    Method of Intubation (2nd Attempt):  Direct and video laryngoscopy    Blade (2nd Attempt):  Torres 2    Laryngeal View Grade (2nd Attempt): Grade IIa - cords partially seen      Attempted By (3rd Attempt):  Staff anesthesiologist    Method of Intubation (3rd Attempt):  Direct and video laryngoscopy    Blade (3rd Attempt):  Glidescope 4    Laryngeal View Grade (3rd Attempt): Grade I - full view of cords      Intubation Comments:  Pt with large base of tongue mass, nasal intubation attempted x 2, unable to advance ETT with Mcgills despite Grade 1 view, oral intubation successful    Difficult Airway Encountered?: Yes      Future Airway Recommendations:  Oral with video scope    Complications:  None    Airway Device:  Oral endotracheal tube and nasal long    Airway Device Size:  6.0    Style/Cuff Inflation:  Cuffed (inflated to minimal occlusive pressure)    Inflation Amount (mL):  5    Tube secured:  20    Secured at:  The lips    Placement Verified By:  Capnometry and Fiber optic visualization    Complicating Factors:  Bleeding in oropharynx and supraglottic mass or abscess    Findings Post-Intubation:  BS equal bilateral and atraumatic/condition of teeth unchanged    
Yes

## 2023-05-18 ENCOUNTER — OFFICE VISIT (OUTPATIENT)
Dept: OTOLARYNGOLOGY | Facility: CLINIC | Age: 58
End: 2023-05-18
Payer: MEDICAID

## 2023-05-18 VITALS
WEIGHT: 166.31 LBS | SYSTOLIC BLOOD PRESSURE: 122 MMHG | DIASTOLIC BLOOD PRESSURE: 79 MMHG | HEART RATE: 80 BPM | BODY MASS INDEX: 24.08 KG/M2

## 2023-05-18 DIAGNOSIS — C77.0 SECONDARY MALIGNANT NEOPLASM OF CERVICAL LYMPH NODE: ICD-10-CM

## 2023-05-18 DIAGNOSIS — C01 SQUAMOUS CELL CARCINOMA OF BASE OF TONGUE: Primary | ICD-10-CM

## 2023-05-18 DIAGNOSIS — R49.0 DYSPHONIA: ICD-10-CM

## 2023-05-18 PROCEDURE — 31575 DIAGNOSTIC LARYNGOSCOPY: CPT | Mod: PBBFAC | Performed by: STUDENT IN AN ORGANIZED HEALTH CARE EDUCATION/TRAINING PROGRAM

## 2023-05-18 PROCEDURE — 99214 OFFICE O/P EST MOD 30 MIN: CPT | Mod: PBBFAC | Performed by: STUDENT IN AN ORGANIZED HEALTH CARE EDUCATION/TRAINING PROGRAM

## 2023-05-18 NOTE — PROGRESS NOTES
The scope used for the exam was:  Flexible scope ENF-P4  Serial Number:  1)    [id number]    []   2)    [id number]    []   3)    [id number]    [x]   4)    [id number]    []   5)    [id number]    []   6)    [id number]    []       The scope used for the exam was:  Rigid scope   Serial Number:  1)   [id number]    []   2)   6282    []   3)   7330    []   4)    3384   []   5)    0824   []   6)    5554   []     7)   7425    []   8)   2240    []   9)   1109    []

## 2023-05-18 NOTE — PROGRESS NOTES
[facility]  Otolaryngology Clinic Note    Yaron Brennan  Encounter Date: 5/18/2023  YOB: 1965  Physician: Rubin Hernandez    Chief Complaint: Otalgia, neck mass    HPI: Yaron Brennan is a 58 y.o. male with HTN who presents as referral from Dr. Welsh for squamous cell carcinoma of base of tongue. Patient reports that toward the beginning of the year he started to have worsening left otalgia. He then noticed a bump/swelling on left neck that he thought was was due to a bug bite. This got progressively bigger over time and he then noticed some swelling on right neck as well. He eventually presented to [facility] in early July 2022. He had a CT neck that showed an ulcerated mass in the tongue base, and underwent direct laryngoscopy with ENT on 7/6/22 (Dr. Tay). Operative findings: ulcerated friable mass base of the tongue that seems confined to base of tongue; lingual surface of epiglottis is free from tumor; mass involves bilateral base of tongue, significantly midline. Results returned positive for p16+ SCCa. Today in clinic patient reports bilateral L>R otalgia, mild dysphagia/odynophagia. He reports he eats basically whatever he wants without overt signs of aspiration. He does report 40lb weight loss in the last 6 months. + Neck LAD. Reports voice changes. Denies any issues breathing. Smoked 1ppd since 17 years old (42 years) but quit a month ago when he got his diagnosis. Also previously drank 12 pack of beer a day and likewise quite a month ago. Referred to ENT for consideration of possible surgical resection.     07/05/2022: CT soft tissue of the neck without contrast:  -ulcerated tongue base mass, at least 3.4 cm by 3.0 cm x 3.1 cm; associated bulky level 2 and level 3 cervical lymphadenopathy; suspected level 1 and level 4 cervical lymphadenopathy; a right level 2 lymph node with central necrosis 2.6 x 3.3 cm; a left level 3 lymph node 4.5 x 3.2 cm; no  significant airway compromise; no acute or aggressive bony lesions  -malignant ulcerated type mass with lymph node metastasis; no airway compromise; right upper lung lobe indistinct ground-glass pulmonary nodules typical of benign infectious or inflammatory etiology    8/16/22: Here today for follow up. Had dental extractions performed on 8/5. Not having any issues. Taking in 4-5 ensures daily. Trying to take some other soft foods like grits and pudding.     9/22/22: Here for follow up. Patient is undergoing CRT and tolerating it well. He reports he has one more chemotherapy session left and about 2-3 weeks of radiation left. Overall maintaining his weight. No progressive dysphagia, swallowing fine. Also no breathing issues or other complaints.    11/3/22: Patient is here today for follow up.  He finished CRT on 10/10.  Patient states he is still in some pain in  his neck and inside of his mouth.   He is tolerating p.o. intake but states at times he does have difficulty swallowing.  He does have xerostomia.  Patient has not used his eardrops to loosen the cerumen impaction.  Denies any shortness of breath or difficulty breathing/ changes to voice,  changes to his neck masses.    12/7/22:  Patient returns for surveillance today.  Patient denies any new dysphagia, odynophagia, H&N lumps or bumps, otalgia, weight loss.  Overall pain is getting better, tolerating diet, gaining weight.    03/14/2023 doing well, no pain, no otalgia, dysphagia has improved significantly, weight is stable.  No hemoptysis.  Voice remains hoarse.  No airway distress.    4/11/2023: Returns today for follow-up. Reports doing well overall. No dysphagia, swallowing all foods without issue. Voice stable. No odynophagia, no SOB, no otalgia. Does note feeling like ears are clogged, has tried ear drops without success. Did get TSH since last visit, wnl.    5/18/23: Patient here today for follow up. Doing well since last visit. No major concerns.  Denies weight loss, new lumps/bumps, sore throat, dysphagia, odynophagia, or otalgia. Having some pain when chewing. Does not have dentures.       Review of patient's allergies indicates:  No Known Allergies    Past Medical History:   Diagnosis Date    Cancer     Hypertension        Past Surgical History:   Procedure Laterality Date    BACK SURGERY      DIRECT LARYNGOSCOPY N/A 2022    Procedure: LARYNGOSCOPY, DIRECT;  Surgeon: Mekhi Grossman MD;  Location: [facility] OR;  Service: ENT;  Laterality: N/A;    EXTRACTION OF TOOTH N/A 2022    Procedure: EXTRACTION, TEETH;  Surgeon: Mekhi Grossman MD;  Location: [facility] OR;  Service: ENT;  Laterality: N/A;    SPINE SURGERY      Not sure of date maybe in        Social History     Socioeconomic History    Marital status:      Spouse name: Blanca   Occupational History    Occupation: Disabled   Tobacco Use    Smoking status: Former     Types: Cigarettes     Start date:      Quit date: 2022     Years since quittin.8    Smokeless tobacco: Never    Tobacco comments:     Been almost 35 yr- Using Nicotine patches- denies smoking at current time   Substance and Sexual Activity    Alcohol use: Not Currently    Drug use: Yes     Types: Marijuana     Comment: Daily    Sexual activity: Yes     Partners: Female     Birth control/protection: None     Social Determinants of Health     Transportation Needs: No Transportation Needs    Lack of Transportation (Medical): No    Lack of Transportation (Non-Medical): No   Housing Stability: High Risk    Unable to Pay for Housing in the Last Year: Yes    Number of Places Lived in the Last Year: 2    Unstable Housing in the Last Year: No       Family History   Problem Relation Age of Onset    Hypertension Mother     Cancer Father     Hypertension Sister     Hypertension Brother        Outpatient Encounter Medications as of 2023   Medication Sig Dispense Refill    amLODIPine (NORVASC) 10 MG tablet Take 1  tablet (10 mg total) by mouth once daily. 90 tablet 1    gabapentin (NEURONTIN) 300 MG capsule Take 300 mg by mouth 3 (three) times daily.      HYDROcodone-acetaminophen (NORCO) 5-325 mg per tablet Take 1 tablet by mouth every 6 (six) hours as needed.      hydrocortisone (PROCTOCORT) 10 % (80 mg) rectal foam Place 1 applicator rectally 2 (two) times daily as needed (hemorrhoidal flare). 15 g 1    LIDOcaine HCl 2% (XYLOCAINE) 2 % Soln Swish and spit with 15 mL every 6 hours as needed. Do not swallow. 100 mL 0    mometasone (ELOCON) 0.1 % ointment Apply topically 2 (two) times daily.      nicotine (NICODERM CQ) 21 mg/24 hr 1 patch once daily.      ondansetron (ZOFRAN-ODT) 8 MG TbDL Take 1 tablet (8 mg total) by mouth every 8 (eight) hours as needed (nausea). 90 tablet 3    rivaroxaban (XARELTO) 20 mg Tab Start taking 1 tablet once daily by mouth with evening meal 30 tablet 1    SPS, WITH SORBITOL, 15-20 gram/60 mL Susp Take by mouth.       Facility-Administered Encounter Medications as of 5/18/2023   Medication Dose Route Frequency Provider Last Rate Last Admin    lactated ringers infusion   Intravenous Continuous Elzbieta Quesada MD 10 mL/hr at 08/05/22 0808 New Bag at 08/05/22 0808    LIDOcaine (PF) 10 mg/ml (1%) injection 10 mg  1 mL Intradermal Once SANDY Levy        LIDOcaine (PF) 40 mg/mL (4 %) injection 2 mL  2 mL Other 1 time in Clinic/HOD Tony Pepe MD        phenylephrine HCL 1% nasal spray 1 spray  1 spray Each Nostril 1 time in Clinic/HOD Tony Pepe MD        sodium chloride 0.9% flush 10 mL  10 mL Intravenous PRN Anna Taylor MD           Physical Exam:  Vitals:    05/18/23 0901   BP: 122/79   Pulse: 80   Weight: 75.4 kg (166 lb 4.8 oz)       Constitutional  General Appearance: well nourished, well-developed, AAO x3, NAD; voice slightly rough  HEENT  Eyes: PEERLA, EOMI, normal conjunctivae  Ears: Hearing well at conversation level;    AD: auricle normal,cerumen  impaction   AS: auricle normal, cerumen impaction  Nose: septum midline, no inferior turbinate hypertrophy, no polyps, moist mucosa without erythema or blue hue  OC/OP: edentulous,  no oral lesions,  unable to fully palpate tongue/FOM/BOT due to very bad gag reflex- soft, no leukoplakia/ulcerations/ tenderness, good protrusion of tongue  Nasopharynx, Hypopharynx, and Larynx:    Indirect: attempted, limited view due to patient intolerance  Neck: soft neck bilaterally, no discrete lymph nodes appreciated. Postradiation induced edema  Neuro: CN II - XII intact bilaterally  Respiratory: non-labored respirations on RA  Psychiatric: oriented to time, place and person, no depression, anxiety or agitation    Flexible Fiberoptic Laryngoscopy/Nasopharyngoscopy via right naris  Anesthesia: none   Adverse Events: None  Indications: Dysphonia, history of chemoradiation therapy to head and neck  Resident Surgeon: Rubin Hernandez MD  Blood loss: none  Condition: good    Procedure in Detail: Informed consent was obtained from the patient after explanation of procedure, indications, risks and benefits. Flexible endoscopy was performed on Yaron Brennan through the right nasal passages. The nasal cavity, nasopharynx, oropharynx, hypopharynx and larynx were adequately visualized. The true vocal cords and arytenoids were examined during phonation and repose.    Findings:  NP/OP: no masses/lesions of NC, eustachian tube, fossa of Rosenmuller, no adenoid hypertrophy  BOT/vallecula:   moderate edema, no signs of ulcerations or lesions, edema of bilateral lateral pharyngeal walls with lateral and A-P collapse  Piriform sinuses/post-cricoid: limited view secondary to pooling of secretions and post XRT edema  Epiglottis: lingual and laryngeal surfaces within normal limits with radiation effect and curling of the epiglottis  Arytenoids/FVFs: no masses/lesions, significant post radiation edema, bilateral movement intact, thick secretions in  larynx, watery edema, right polyps seems to have resolved  TVCs: bilateral cord mobility, no masses or lesions  No aspiration, thick secretions; no aspiration noted  Patient does have postradiation changes throughout the larynx.        Pertinent Data:    No new data to review    Assessment/Plan:  Yaron Brennan is a 58 y.o. 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 CRT, finished 10/10/22.   Currently doing well  following his CRT.  PET 1/11/23 without disease (in [facility] system), improving primary and LAD size on CT neck 1/31/23. TSH wnl 2/2/23.     - 6 month surveillance scans (CXR, CT Neck) and TSH ordered today  - Patient will return to clinic in 6 weeks for cancer surveillance and to review imaging.     Rubin Hernandez MD  Otolaryngology PGY-III          HEAD & NECK CANCER SURVEILLANCE   Radiation and/or Chemotherapy     Medical Oncologist: Kevon Welsh MD  Radiation Oncologist: Unknown    Primary tumor: cT3 cN2 M0 p16+ base of tongue    Date of Diagnosis: 7/6/22  Therapy: Cisplatin 100 mg/m² IV days 1, 22, and 23, concurrent with RT  Completion Date: 10/10/22    Pertinent Treatment History:   first presentation 7/2022  CT neck 7/5/22 & 7/29/22  Panendoscopy 7/6/22  Right UE DVT 2/2 PICC 9/15/22 on Xarelto  CRT 8/25/22 - 10/10/22      Place date if completed   3 6 12 18 24 36 48 60   CT Neck 1/31/23 X X X X X X X   PET/CT 1/11/23          CXR  X X X X X X X   TFT 2/2/23  X  X X X X     Current Surveillance Interval:  <1 year; every 4-6 weeks

## 2023-06-01 ENCOUNTER — OFFICE VISIT (OUTPATIENT)
Dept: INTERNAL MEDICINE | Facility: CLINIC | Age: 58
End: 2023-06-01
Payer: MEDICAID

## 2023-06-01 VITALS
SYSTOLIC BLOOD PRESSURE: 123 MMHG | RESPIRATION RATE: 18 BRPM | BODY MASS INDEX: 24.24 KG/M2 | TEMPERATURE: 98 F | WEIGHT: 169.31 LBS | DIASTOLIC BLOOD PRESSURE: 76 MMHG | HEIGHT: 70 IN | HEART RATE: 67 BPM

## 2023-06-01 DIAGNOSIS — Z12.5 SCREENING FOR MALIGNANT NEOPLASM OF PROSTATE: ICD-10-CM

## 2023-06-01 DIAGNOSIS — Z72.0 TOBACCO ABUSE: ICD-10-CM

## 2023-06-01 DIAGNOSIS — D64.9 ANEMIA, UNSPECIFIED TYPE: ICD-10-CM

## 2023-06-01 DIAGNOSIS — Z11.3 ROUTINE SCREENING FOR STI (SEXUALLY TRANSMITTED INFECTION): ICD-10-CM

## 2023-06-01 DIAGNOSIS — K64.9 HEMORRHOIDS, UNSPECIFIED HEMORRHOID TYPE: ICD-10-CM

## 2023-06-01 DIAGNOSIS — R19.5 POSITIVE COLORECTAL CANCER SCREENING USING COLOGUARD TEST: Primary | ICD-10-CM

## 2023-06-01 DIAGNOSIS — Z13.1 SCREENING FOR DIABETES MELLITUS: ICD-10-CM

## 2023-06-01 DIAGNOSIS — I10 PRIMARY HYPERTENSION: Chronic | ICD-10-CM

## 2023-06-01 PROCEDURE — 99406 PR TOBACCO USE CESSATION INTERMEDIATE 3-10 MINUTES: ICD-10-PCS | Mod: S$PBB,,, | Performed by: NURSE PRACTITIONER

## 2023-06-01 PROCEDURE — 99214 OFFICE O/P EST MOD 30 MIN: CPT | Mod: PBBFAC | Performed by: NURSE PRACTITIONER

## 2023-06-01 PROCEDURE — 1160F RVW MEDS BY RX/DR IN RCRD: CPT | Mod: CPTII,,, | Performed by: NURSE PRACTITIONER

## 2023-06-01 PROCEDURE — 1159F MED LIST DOCD IN RCRD: CPT | Mod: CPTII,,, | Performed by: NURSE PRACTITIONER

## 2023-06-01 PROCEDURE — 99214 PR OFFICE/OUTPT VISIT, EST, LEVL IV, 30-39 MIN: ICD-10-PCS | Mod: 25,S$PBB,, | Performed by: NURSE PRACTITIONER

## 2023-06-01 PROCEDURE — 99214 OFFICE O/P EST MOD 30 MIN: CPT | Mod: 25,S$PBB,, | Performed by: NURSE PRACTITIONER

## 2023-06-01 PROCEDURE — 3074F PR MOST RECENT SYSTOLIC BLOOD PRESSURE < 130 MM HG: ICD-10-PCS | Mod: CPTII,,, | Performed by: NURSE PRACTITIONER

## 2023-06-01 PROCEDURE — 3008F BODY MASS INDEX DOCD: CPT | Mod: CPTII,,, | Performed by: NURSE PRACTITIONER

## 2023-06-01 PROCEDURE — 3078F DIAST BP <80 MM HG: CPT | Mod: CPTII,,, | Performed by: NURSE PRACTITIONER

## 2023-06-01 PROCEDURE — 1159F PR MEDICATION LIST DOCUMENTED IN MEDICAL RECORD: ICD-10-PCS | Mod: CPTII,,, | Performed by: NURSE PRACTITIONER

## 2023-06-01 PROCEDURE — 3078F PR MOST RECENT DIASTOLIC BLOOD PRESSURE < 80 MM HG: ICD-10-PCS | Mod: CPTII,,, | Performed by: NURSE PRACTITIONER

## 2023-06-01 PROCEDURE — 1160F PR REVIEW ALL MEDS BY PRESCRIBER/CLIN PHARMACIST DOCUMENTED: ICD-10-PCS | Mod: CPTII,,, | Performed by: NURSE PRACTITIONER

## 2023-06-01 PROCEDURE — 99406 BEHAV CHNG SMOKING 3-10 MIN: CPT | Mod: S$PBB,,, | Performed by: NURSE PRACTITIONER

## 2023-06-01 PROCEDURE — 3008F PR BODY MASS INDEX (BMI) DOCUMENTED: ICD-10-PCS | Mod: CPTII,,, | Performed by: NURSE PRACTITIONER

## 2023-06-01 PROCEDURE — 3074F SYST BP LT 130 MM HG: CPT | Mod: CPTII,,, | Performed by: NURSE PRACTITIONER

## 2023-06-01 RX ORDER — NALOXONE HYDROCHLORIDE 4 MG/.1ML
SPRAY NASAL
Status: ON HOLD | COMMUNITY
Start: 2022-12-30 | End: 2023-10-02 | Stop reason: HOSPADM

## 2023-06-01 RX ORDER — AMLODIPINE BESYLATE 10 MG/1
10 TABLET ORAL DAILY
Qty: 90 TABLET | Refills: 3 | Status: SHIPPED | OUTPATIENT
Start: 2023-06-01

## 2023-06-01 RX ORDER — TRAMADOL HYDROCHLORIDE 50 MG/1
50 TABLET ORAL EVERY 6 HOURS PRN
Status: ON HOLD | COMMUNITY
Start: 2023-05-25 | End: 2023-10-02 | Stop reason: HOSPADM

## 2023-06-01 NOTE — PROGRESS NOTES
Bia Tucker, SANDY   [facility]  [address] [facility] [id number]      PATIENT NAME: Yaron Brennan  : 1965  DATE: 23  MRN: [id number]      Reason for Visit / Chief Complaint: Follow-up (Fasting, refused vaccines)       History of Present Illness / Problem Focused Workflow     Yaron Brennan is a 58 y.o. Black or  male presents to the clinic for HTN f/u.  PMH squamous cell carcinoma of the base of the tongue (dx'd 2022), RUE DVT (2/2 PICC line 9/15/22), HTN, ETOH and tobacco abuse. Followed by [facility] oncology and ENT clinics.    Pt had last ENT visit on 23 and has scheduled oncology in July. States has not been contacted by GI; will f/u on referral. BP at goal. Is following low sodium diet. Continues to endorse gum soreness with eating. Has not been able to find location that accepts his insurance to obtain dentures. Continues to smoke marijuana daily; has continued smoking cessation since 2022. Declines vaccines today. Denies chest pain, shortness of breath, cough, fever, headache, dizziness, weakness, abdominal pain, nausea, vomiting, diarrhea, constipation, dysuria, depression, anxiety, SI/HI.    Review of Systems     Review of Systems     See HPI for details    Constitutional: Denies Change in appetite. Denies Chills. Denies Fever. Denies Night sweats.  Eye: Denies Blurred vision. Denies Discharge. Denies Eye pain.  ENT: Denies Decreased hearing. Denies Sore throat. Denies Swollen glands.  Respiratory: Denies Cough. Denies Shortness of breath. Denies Shortness of breath with exertion. Denies Wheezing.  Cardiovascular: Denies Chest pain at rest. Denies Chest pain with exertion. Denies Irregular heartbeat. Denies Palpitations. Denies Edema.  Gastrointestinal: Denies Abdominal pain. Denies Diarrhea. Denies Nausea. Denies Vomiting. Denies Hematemesis or Hematochezia.  Genitourinary: Denies Dysuria. Denies  Urinary frequency. Denies Urinary urgency. Denies Blood in urine.  Endocrine: Denies Cold intolerance. Denies Excessive thirst. Denies Heat intolerance. Denies Weight loss. Denies Weight gain.  Musculoskeletal: Denies Painful joints. Denies Weakness.  Integumentary: Denies Rash. Denies Itching. Denies Dry skin.  Neurologic: Denies Dizziness. Denies Fainting. Denies Headache.  Psychiatric: Denies Depression. Denies Anxiety. Denies Suicidal/Homicidal ideations.    All Other ROS: Negative except as stated in HPI.     Medical / Surgical / Social / Family History       ----------------------------  Cancer  Hypertension     Past Surgical History:   Procedure Laterality Date    BACK SURGERY      DIRECT LARYNGOSCOPY N/A 2022    Procedure: LARYNGOSCOPY, DIRECT;  Surgeon: Mekhi Grossman MD;  Location: [facility];  Service: ENT;  Laterality: N/A;    EXTRACTION OF TOOTH N/A 2022    Procedure: EXTRACTION, TEETH;  Surgeon: Mekhi Grossman MD;  Location: [facility];  Service: ENT;  Laterality: N/A;    SPINE SURGERY      Not sure of date maybe in        Social History     Socioeconomic History    Marital status:      Spouse name: Blanca   Occupational History    Occupation: Disabled   Tobacco Use    Smoking status: Former     Types: Cigarettes     Start date:      Quit date: 2022     Years since quittin.9    Smokeless tobacco: Never    Tobacco comments:     Been almost 35 yr- Using Nicotine patches- denies smoking at current time   Substance and Sexual Activity    Alcohol use: Not Currently    Drug use: Yes     Types: Marijuana     Comment: Daily    Sexual activity: Yes     Partners: Female     Birth control/protection: None     Social Determinants of Health     Transportation Needs: No Transportation Needs    Lack of Transportation (Medical): No    Lack of Transportation (Non-Medical): No   Housing Stability: High Risk    Unable to Pay for Housing in the Last Year: Yes    Number of Places  "Lived in the Last Year: 2    Unstable Housing in the Last Year: No        Family History   Problem Relation Age of Onset    Hypertension Mother     Cancer Father     Hypertension Sister     Hypertension Brother         Medications and Allergies     Medications  Current Outpatient Medications   Medication Instructions    amLODIPine (NORVASC) 10 mg, Oral, Daily    hydrocortisone (PROCTOCORT) 10 % (80 mg) rectal foam 1 applicator, Rectal, 2 times daily PRN    LIDOcaine HCl 2% (XYLOCAINE) 2 % Soln Swish and spit with 15 mL every 6 hours as needed. Do not swallow.    mometasone (ELOCON) 0.1 % ointment Topical (Top), 2 times daily    naloxone (NARCAN) 4 mg/actuation Spry SMARTSIG:Both Nares    nicotine (NICODERM CQ) 21 mg/24 hr 1 patch, Daily    ondansetron (ZOFRAN-ODT) 8 mg, Oral, Every 8 hours PRN    SPS, WITH SORBITOL, 15-20 gram/60 mL Susp Oral    traMADoL (ULTRAM) 50 mg, Oral, Every 6 hours PRN         Allergies  Review of patient's allergies indicates:  No Known Allergies    Physical Examination     /76 (BP Location: Right arm, Patient Position: Sitting, BP Method: Medium (Automatic))   Pulse 67   Temp 98.2 °F (36.8 °C) (Oral)   Resp 18   Ht 5' 9.69" (1.77 m)   Wt 76.8 kg (169 lb 5 oz)   BMI 24.51 kg/m²     Physical Exam  HENT:      Mouth/Throat:      Comments: edentulous      General: Alert and oriented, No acute distress.  Head: Normocephalic, Atraumatic.  Eye: Pupils are equal, round and reactive to light, Extraocular movements are intact, Sclera non-icteric.  Ears/Nose/Throat: Normal, Mucosa moist,Clear.  Neck/Thyroid: Supple, Non-tender, No carotid bruit, No lymphadenopathy, No JVD, Full range of motion.  Respiratory: Clear to auscultation bilaterally; No wheezes, rales or rhonchi,Non-labored respirations, Symmetrical chest wall expansion.  Cardiovascular: Regular rate and rhythm, S1/S2 normal, No murmurs, rubs or gallops.  Gastrointestinal: Soft, Non-tender, Non-distended, Normal bowel sounds, No " palpable organomegaly.  Musculoskeletal: Normal range of motion.  Integumentary: Warm, Dry, Intact, No suspicious lesions or rashes.  Extremities: No clubbing, cyanosis or edema  Neurologic: No focal deficits, Cranial Nerves II-XII are grossly intact, Motor strength normal upper and lower extremities, Sensory exam intact.  Psychiatric: Normal interaction, Coherent speech, Appropriate affect       Results     Lab Results   Component Value Date    WBC 5.4 01/20/2023    HGB 11.2 (L) 01/20/2023    HCT 33.9 (L) 01/20/2023     01/20/2023    CHOL 173 11/22/2022    TRIG 52 11/22/2022    ALT 8 01/20/2023    AST 13 01/20/2023     01/20/2023    K 4.2 01/20/2023    CREATININE 1.09 01/20/2023    BUN 18.0 01/20/2023    CO2 30 (H) 01/20/2023    TSH 3.904 02/02/2023    PSA 1.29 11/22/2022    HGBA1C 4.8 11/22/2022         Assessment and Plan (including Health Maintenance)     Plan:     1. Primary hypertension  At goal.  Continue amlodipine.  Follow a low sodium (less than 2 grams of sodium per day), DASH diet.   Continue medications as prescribed.  Monitor blood pressure and report any consistent values greater than 140/90 and keep a log.  Encouraged continued smoking cessation to aid in BP reduction and co-morbidities.   Maintain healthy weight with a BMI goal of <30.   Aerobic exercise for 150 minutes per week (or 5 days a week for 30 minutes each day).    - CBC Auto Differential; Future  - Comprehensive Metabolic Panel; Future  - Lipid Panel; Future  - Microalbumin/Creatinine Ratio, Urine; Future  - Urinalysis, Reflex to Urine Culture; Future    2. Positive colorectal cancer screening using Cologuard test  Re-referred to Dr. Joseph Louise; was not contacted by Dr. Pereira's office.  Endorses hemorrhoids.  ED precautions.  - Ambulatory referral/consult to Gastroenterology; Future    3. Hemorrhoids, unspecified hemorrhoid type  Refilled proctofoam prn.  Keep GI visit when scheduled.  Avoid straining or sitting on the  toilet for long periods of time.  Anusol cream prn.  Hydrocortisone suppositories 25 mg at bedtime x 7 days.  Sitz baths during flares.  Take miralax and stool softener daily.  Educated on high fiber diet and exercise.  Drink at least 64 ozs of water daily.  ED precautions advised.      4. Tobacco abuse  Smoking cessation discussed; total time 3 mins.   Pt quit in July 2022.  Encouraged and congratulated on continued cessation.  Discussed benefits of quitting including improved health, decreased cardiac/vascular/pulmonary/stroke risks as well as saving money.          Problem List Items Addressed This Visit          Cardiac/Vascular    Primary hypertension (Chronic)    Relevant Orders    CBC Auto Differential    Comprehensive Metabolic Panel    Lipid Panel    Microalbumin/Creatinine Ratio, Urine    Urinalysis, Reflex to Urine Culture       Oncology    Positive colorectal cancer screening using Cologuard test - Primary    Relevant Orders    Ambulatory referral/consult to Gastroenterology    Anemia       GI    Hemorrhoids (Chronic)       Other    Tobacco abuse (Chronic)     Other Visit Diagnoses       Screening for diabetes mellitus        Relevant Orders    Hemoglobin A1C    Screening for malignant neoplasm of prostate        Relevant Orders    PSA, Screening    Routine screening for STI (sexually transmitted infection)        Relevant Orders    Hepatitis Panel, Acute    HIV 1/2 Ag/Ab (4th Gen)              Health Maintenance Due   Topic Date Due    Hepatitis C Screening  Never done    COVID-19 Vaccine (1) Never done    Pneumococcal Vaccines (Age 0-64) (1 - PCV) Never done    HIV Screening  Never done    TETANUS VACCINE  Never done    Shingles Vaccine (1 of 2) Never done       Follow up in about 6 months (around 12/1/2023) for Wellness with labs one week prior to appt. .        Signature:  SANDY Brandt  [facility]  [address] [facility]  [id number]

## 2023-06-08 ENCOUNTER — HOSPITAL ENCOUNTER (OUTPATIENT)
Dept: RADIOLOGY | Facility: HOSPITAL | Age: 58
Discharge: HOME | End: 2023-06-08
Attending: STUDENT IN AN ORGANIZED HEALTH CARE EDUCATION/TRAINING PROGRAM
Payer: MEDICAID

## 2023-06-08 DIAGNOSIS — C01 SQUAMOUS CELL CARCINOMA OF BASE OF TONGUE: ICD-10-CM

## 2023-06-08 DIAGNOSIS — C77.0 SECONDARY MALIGNANT NEOPLASM OF CERVICAL LYMPH NODE: ICD-10-CM

## 2023-06-08 LAB
CREAT SERPL-MCNC: 0.99 MG/DL (ref 0.73–1.18)
GFR SERPLBLD CREATININE-BSD FMLA CKD-EPI: >60 MLS/MIN/1.73/M2

## 2023-06-08 PROCEDURE — 70491 CT SOFT TISSUE NECK W/DYE: CPT | Mod: TC

## 2023-06-08 PROCEDURE — 82565 ASSAY OF CREATININE: CPT | Performed by: STUDENT IN AN ORGANIZED HEALTH CARE EDUCATION/TRAINING PROGRAM

## 2023-06-08 PROCEDURE — 25500020 PHARM REV CODE 255: Performed by: STUDENT IN AN ORGANIZED HEALTH CARE EDUCATION/TRAINING PROGRAM

## 2023-06-08 RX ADMIN — IOHEXOL 100 ML: 350 INJECTION, SOLUTION INTRAVENOUS at 08:06

## 2023-06-13 ENCOUNTER — OFFICE VISIT (OUTPATIENT)
Dept: OTOLARYNGOLOGY | Facility: CLINIC | Age: 58
End: 2023-06-13
Payer: MEDICAID

## 2023-06-13 DIAGNOSIS — Z92.3 HISTORY OF RADIATION TO HEAD AND NECK REGION: ICD-10-CM

## 2023-06-13 DIAGNOSIS — Z85.819 HISTORY OF OROPHARYNGEAL CANCER: Primary | ICD-10-CM

## 2023-06-13 PROCEDURE — 99213 OFFICE O/P EST LOW 20 MIN: CPT | Mod: PBBFAC | Performed by: STUDENT IN AN ORGANIZED HEALTH CARE EDUCATION/TRAINING PROGRAM

## 2023-06-13 PROCEDURE — 31575 DIAGNOSTIC LARYNGOSCOPY: CPT | Mod: PBBFAC | Performed by: STUDENT IN AN ORGANIZED HEALTH CARE EDUCATION/TRAINING PROGRAM

## 2023-06-13 NOTE — PROGRESS NOTES
[facility] and [facility]  Otolaryngology Clinic Note    Yaron Brennan  Encounter Date: 6/13/2023  YOB: 1965  Physician: Myra Szymanski    Chief Complaint: Otalgia, neck mass    HPI: Yaron Brennan is a 58 y.o. male with HTN who presents as referral from Dr. Welsh for squamous cell carcinoma of base of tongue. Patient reports that toward the beginning of the year he started to have worsening left otalgia. He then noticed a bump/swelling on left neck that he thought was was due to a bug bite. This got progressively bigger over time and he then noticed some swelling on right neck as well. He eventually presented to [facility] in early July 2022. He had a CT neck that showed an ulcerated mass in the tongue base, and underwent direct laryngoscopy with ENT on 7/6/22 (Dr. Tay). Operative findings: ulcerated friable mass base of the tongue that seems confined to base of tongue; lingual surface of epiglottis is free from tumor; mass involves bilateral base of tongue, significantly midline. Results returned positive for p16+ SCCa. Today in clinic patient reports bilateral L>R otalgia, mild dysphagia/odynophagia. He reports he eats basically whatever he wants without overt signs of aspiration. He does report 40lb weight loss in the last 6 months. + Neck LAD. Reports voice changes. Denies any issues breathing. Smoked 1ppd since 17 years old (42 years) but quit a month ago when he got his diagnosis. Also previously drank 12 pack of beer a day and likewise quite a month ago. Referred to ENT for consideration of possible surgical resection.     07/05/2022: CT soft tissue of the neck without contrast:  -ulcerated tongue base mass, at least 3.4 cm by 3.0 cm x 3.1 cm; associated bulky level 2 and level 3 cervical lymphadenopathy; suspected level 1 and level 4 cervical lymphadenopathy; a right level 2 lymph node with central necrosis 2.6 x 3.3 cm; a left level 3 lymph node 4.5 x 3.2 cm; no  significant airway compromise; no acute or aggressive bony lesions  -malignant ulcerated type mass with lymph node metastasis; no airway compromise; right upper lung lobe indistinct ground-glass pulmonary nodules typical of benign infectious or inflammatory etiology    8/16/22: Here today for follow up. Had dental extractions performed on 8/5. Not having any issues. Taking in 4-5 ensures daily. Trying to take some other soft foods like grits and pudding.     9/22/22: Here for follow up. Patient is undergoing CRT and tolerating it well. He reports he has one more chemotherapy session left and about 2-3 weeks of radiation left. Overall maintaining his weight. No progressive dysphagia, swallowing fine. Also no breathing issues or other complaints.    11/3/22: Patient is here today for follow up.  He finished CRT on 10/10.  Patient states he is still in some pain in  his neck and inside of his mouth.   He is tolerating p.o. intake but states at times he does have difficulty swallowing.  He does have xerostomia.  Patient has not used his eardrops to loosen the cerumen impaction.  Denies any shortness of breath or difficulty breathing/ changes to voice,  changes to his neck masses.    12/7/22:  Patient returns for surveillance today.  Patient denies any new dysphagia, odynophagia, H&N lumps or bumps, otalgia, weight loss.  Overall pain is getting better, tolerating diet, gaining weight.    03/14/2023 doing well, no pain, no otalgia, dysphagia has improved significantly, weight is stable.  No hemoptysis.  Voice remains hoarse.  No airway distress.    4/11/2023: Returns today for follow-up. Reports doing well overall. No dysphagia, swallowing all foods without issue. Voice stable. No odynophagia, no SOB, no otalgia. Does note feeling like ears are clogged, has tried ear drops without success. Did get TSH since last visit, wnl.    5/18/23: Patient here today for follow up. Doing well since last visit. No major concerns.  Denies weight loss, new lumps/bumps, sore throat, dysphagia, odynophagia, or otalgia. Having some pain when chewing. Does not have dentures.     23: Returns today for follow-up/surveillance. Patient reports doing well overall. No issues with dysphagia, odynophagia, voice changes, otalgia, or weight loss. Still looking for dentist to get dentures made. Did get CT neck since last visit, but CXR and TSH still pending.     Review of patient's allergies indicates:  No Known Allergies    Past Medical History:   Diagnosis Date    Cancer     Hypertension        Past Surgical History:   Procedure Laterality Date    BACK SURGERY      DIRECT LARYNGOSCOPY N/A 2022    Procedure: LARYNGOSCOPY, DIRECT;  Surgeon: Mekhi Grossman MD;  Location: [facility];  Service: ENT;  Laterality: N/A;    EXTRACTION OF TOOTH N/A 2022    Procedure: EXTRACTION, TEETH;  Surgeon: Mekhi Grossman MD;  Location: [facility];  Service: ENT;  Laterality: N/A;    SPINE SURGERY      Not sure of date maybe in        Social History     Socioeconomic History    Marital status:      Spouse name: Blanca   Occupational History    Occupation: Disabled   Tobacco Use    Smoking status: Former     Types: Cigarettes     Start date:      Quit date: 2022     Years since quittin.9    Smokeless tobacco: Never    Tobacco comments:     Been almost 35 yr- Using Nicotine patches- denies smoking at current time   Substance and Sexual Activity    Alcohol use: Not Currently    Drug use: Yes     Types: Marijuana     Comment: Daily    Sexual activity: Yes     Partners: Female     Birth control/protection: None     Social Determinants of Health     Transportation Needs: No Transportation Needs    Lack of Transportation (Medical): No    Lack of Transportation (Non-Medical): No   Housing Stability: High Risk    Unable to Pay for Housing in the Last Year: Yes    Number of Places Lived in the Last Year: 2    Unstable Housing in the Last Year:  No       Family History   Problem Relation Age of Onset    Hypertension Mother     Cancer Father     Hypertension Sister     Hypertension Brother        Outpatient Encounter Medications as of 6/13/2023   Medication Sig Dispense Refill    amLODIPine (NORVASC) 10 MG tablet Take 1 tablet (10 mg total) by mouth once daily. 90 tablet 3    hydrocortisone (PROCTOCORT) 10 % (80 mg) rectal foam Place 1 applicator rectally 2 (two) times daily as needed (hemorrhoidal flare). 15 g 1    LIDOcaine HCl 2% (XYLOCAINE) 2 % Soln Swish and spit with 15 mL every 6 hours as needed. Do not swallow. 100 mL 0    mometasone (ELOCON) 0.1 % ointment Apply topically 2 (two) times daily.      naloxone (NARCAN) 4 mg/actuation Spry SMARTSIG:Both Nares      nicotine (NICODERM CQ) 21 mg/24 hr 1 patch once daily.      ondansetron (ZOFRAN-ODT) 8 MG TbDL Take 1 tablet (8 mg total) by mouth every 8 (eight) hours as needed (nausea). 90 tablet 3    SPS, WITH SORBITOL, 15-20 gram/60 mL Susp Take by mouth.      traMADoL (ULTRAM) 50 mg tablet Take 50 mg by mouth every 6 (six) hours as needed.      [DISCONTINUED] amLODIPine (NORVASC) 10 MG tablet Take 1 tablet (10 mg total) by mouth once daily. 90 tablet 1    [DISCONTINUED] gabapentin (NEURONTIN) 300 MG capsule Take 300 mg by mouth 3 (three) times daily.      [DISCONTINUED] HYDROcodone-acetaminophen (NORCO) 5-325 mg per tablet Take 1 tablet by mouth every 6 (six) hours as needed.      [DISCONTINUED] hydrocortisone (PROCTOCORT) 10 % (80 mg) rectal foam Place 1 applicator rectally 2 (two) times daily as needed (hemorrhoidal flare). 15 g 1    [DISCONTINUED] rivaroxaban (XARELTO) 20 mg Tab Start taking 1 tablet once daily by mouth with evening meal (Patient not taking: Reported on 6/1/2023) 30 tablet 1     Facility-Administered Encounter Medications as of 6/13/2023   Medication Dose Route Frequency Provider Last Rate Last Admin    lactated ringers infusion   Intravenous Continuous Elzbieta Quesada MD 10 mL/hr  at 08/05/22 0808 New Bag at 08/05/22 0808    LIDOcaine (PF) 10 mg/ml (1%) injection 10 mg  1 mL Intradermal Once Yajaira S SANDY Watts        LIDOcaine (PF) 40 mg/mL (4 %) injection 2 mL  2 mL Other 1 time in Clinic/HOD Tony Pepe MD        phenylephrine HCL 1% nasal spray 1 spray  1 spray Each Nostril 1 time in Clinic/HOD Tony Pepe MD        sodium chloride 0.9% flush 10 mL  10 mL Intravenous PRN Anna Taylor MD           Physical Exam:  There were no vitals taken for this visit.  Constitutional  General Appearance: well nourished, well-developed, AAO x3, NAD; voice slightly rough  HEENT  Eyes: PEERLA, EOMI, normal conjunctivae  Ears: Hearing well at conversation level;    AD: auricle normal,EAC clear, TM without lesions   AS: auricle normal, EAC clear, TM without lesions  Nose: septum midline, no inferior turbinate hypertrophy, no polyps, moist mucosa without erythema or blue hue  OC/OP: edentulous,  no oral lesions,  unable to fully palpate tongue/FOM/BOT due to very bad gag reflex- soft, no leukoplakia/ulcerations/ tenderness, good protrusion of tongue  Nasopharynx, Hypopharynx, and Larynx:    Indirect: attempted, limited view due to patient intolerance  Neck: soft neck bilaterally, no discrete lymph nodes appreciated. Postradiation induced edema  Neuro: CN II - XII intact bilaterally  Respiratory: non-labored respirations on RA  Psychiatric: oriented to time, place and person, no depression, anxiety or agitation    Flexible Fiberoptic Laryngoscopy/Nasopharyngoscopy via right naris  Anesthesia: Topical phenylephrine and lidocaine  Adverse Events: None  Indications: Dysphonia, history of chemoradiation therapy to head and neck  Resident Surgeon: Myra Szymanski MD  Blood loss: none  Condition: good    Procedure in Detail: Informed consent was obtained from the patient after explanation of procedure, indications, risks and benefits. Flexible endoscopy was performed on Yaron Brennan through  the right nasal passages. The nasal cavity, nasopharynx, oropharynx, hypopharynx and larynx were adequately visualized. The true vocal cords and arytenoids were examined during phonation and repose.    Findings:  NP/OP: no masses/lesions of NC, eustachian tube, fossa of Rosenmuller, no adenoid hypertrophy  BOT/vallecula:   moderate edema, no signs of ulcerations or lesions, edema of bilateral lateral pharyngeal walls with lateral and A-P collapse  Piriform sinuses/post-cricoid: limited view secondary to post XRT edema  Epiglottis: lingual and laryngeal surfaces within normal limits with radiation effect and curling of the epiglottis  Arytenoids/FVFs: no masses/lesions, significant post radiation edema, bilateral movement intact,  watery edema, right symmetric, benign appearing polyp  TVCs: bilateral cord mobility, no masses or lesions  No aspiration, thick secretions; no aspiration noted  Patient does have postradiation changes throughout the larynx.      Pertinent Data:  CT neck 6/08/23:   FINDINGS:  No definite measurable tongue mass on today's exam.  Slightly asymmetric thickening of the posterior tongue on the right greater than left with continued interval improvement.  Right level 2 cervical lymph node measures 7 mm on today's exam short axis image 52 series 2 compared to 1 cm on the prior.  Residual scattered lymph nodes small by imaging criteria.  Mucosal edema again seen along the region of the inferior pharynx, larynx went.  Superimposed malignancy at the level of the supraglottic trachea, distal hypopharyngeal region not excluded.  Thyroid gland normal.  Vascular structures intact.  Mild treatment related subcutaneous adipose tissue edema, thickening anterior and lateral neck bilaterally.  Paraspinal muscles normal.  Bones grossly intact.  Visualized intracranial structures normal.  Paranasal sinuses grossly a normal.  Intraorbital contents in normal.     Impression:     Continued improvement in the  tongue mass with only slight asymmetric thickening at the posterior tongue on the right.  No measurable mass.     Lymphadenopathy improved with the index node in the right level 2 region smaller in the interval.  No enlarged lymph nodes on today's exam.     Continued submucosal thickening at the pharynx and laryngeal level likely post treatment related changes with superimposed residual malignancy not excluded..    Assessment/Plan:  Yaron Brennan is a 58 y.o. 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 CRT, finished 10/10/22.   Currently doing well  following his CRT.  PET 1/11/23 without disease (in [facility] system), improving primary and LAD size on CT neck 1/31/23. TSH wnl 2/2/23. CT neck with thickening in pharynx and supraglottic larynx, no obvious concerning abnormalities seen on FFL and patient asymptomatic today.     - CXR and TSH ordered at previous visit, patient knows to obtain these at next visit  - Dental handout provided to patient so he can look into obtaining dentures  - Patient will return to clinic in 6 weeks for cancer surveillance     Myra Szymanski MD  Otolaryngology PGY-V          HEAD & NECK CANCER SURVEILLANCE   Radiation and/or Chemotherapy     Medical Oncologist: Kevon Welsh MD  Radiation Oncologist: Unknown    Primary tumor: cT3 cN2 M0 p16+ base of tongue    Date of Diagnosis: 7/6/22  Therapy: Cisplatin 100 mg/m² IV days 1, 22, and 23, concurrent with RT  Completion Date: 10/10/22    Pertinent Treatment History:   first presentation 7/2022  CT neck 7/5/22 & 7/29/22  Panendoscopy 7/6/22  Right UE DVT 2/2 PICC 9/15/22 on Xarelto  CRT 8/25/22 - 10/10/22      Place date if completed   3 6 12 18 24 36 48 60   CT Neck 1/31/23 X X X X X X X   PET/CT 1/11/23          CXR  X X X X X X X   TFT 2/2/23  X  X X X X     Current Surveillance Interval:  <1 year; every 4-6 weeks

## 2023-06-13 NOTE — PROGRESS NOTES
The scope used for the exam was:  Flexible scope ENF-P4  Serial Number:  1)    [id number]    []   2)    [id number]    []   3)    [id number]    []   4)    [id number]    []   5)    [id number]    [x]   6)    [id number]    []       The scope used for the exam was:  Rigid scope   Serial Number:  1)   [id number]    []   2)   6282    []   3)   7330    []   4)    3384   []   5)    0824   []   6)    5554   []     7)   7425    []   8)   2240    []   9)   1109    []      Answers submitted by the patient for this visit:  Review of Symptoms Questionnaire  (Submitted on 6/8/2023)  None of these: Yes  None of these: Yes  None of these : Yes  None of these: Yes  None of these : Yes  None of these: Yes  None of these: Yes  None of these: Yes  None of these : Yes  None of these: Yes  None of these : Yes  None of these: Yes  None of these: Yes  None of these: Yes

## 2023-07-17 ENCOUNTER — HOSPITAL ENCOUNTER (OUTPATIENT)
Dept: RADIOLOGY | Facility: HOSPITAL | Age: 58
Discharge: HOME | End: 2023-07-17
Attending: STUDENT IN AN ORGANIZED HEALTH CARE EDUCATION/TRAINING PROGRAM
Payer: MEDICAID

## 2023-07-17 DIAGNOSIS — C01 SQUAMOUS CELL CARCINOMA OF BASE OF TONGUE: ICD-10-CM

## 2023-07-17 DIAGNOSIS — C77.0 SECONDARY MALIGNANT NEOPLASM OF CERVICAL LYMPH NODE: ICD-10-CM

## 2023-07-17 PROCEDURE — 71046 X-RAY EXAM CHEST 2 VIEWS: CPT | Mod: TC

## 2023-07-19 ENCOUNTER — OFFICE VISIT (OUTPATIENT)
Dept: OTOLARYNGOLOGY | Facility: CLINIC | Age: 58
End: 2023-07-19
Payer: MEDICAID

## 2023-07-19 VITALS — SYSTOLIC BLOOD PRESSURE: 144 MMHG | HEART RATE: 96 BPM | DIASTOLIC BLOOD PRESSURE: 80 MMHG | TEMPERATURE: 99 F

## 2023-07-19 DIAGNOSIS — Z92.3 HISTORY OF RADIATION TO HEAD AND NECK REGION: ICD-10-CM

## 2023-07-19 DIAGNOSIS — Z85.819 HISTORY OF OROPHARYNGEAL CANCER: Primary | ICD-10-CM

## 2023-07-19 PROCEDURE — 99213 OFFICE O/P EST LOW 20 MIN: CPT | Mod: PBBFAC,25

## 2023-07-19 PROCEDURE — 31575 DIAGNOSTIC LARYNGOSCOPY: CPT | Mod: PBBFAC | Performed by: STUDENT IN AN ORGANIZED HEALTH CARE EDUCATION/TRAINING PROGRAM

## 2023-07-19 RX ORDER — TALC
6 POWDER (GRAM) TOPICAL NIGHTLY PRN
Qty: 30 TABLET | Refills: 0 | Status: SHIPPED | OUTPATIENT
Start: 2023-07-19

## 2023-07-19 RX ORDER — METHOCARBAMOL 750 MG/1
TABLET, FILM COATED ORAL
Status: ON HOLD | COMMUNITY
Start: 2023-06-19 | End: 2023-10-02 | Stop reason: HOSPADM

## 2023-07-19 RX ORDER — ONDANSETRON 4 MG/1
8 TABLET, ORALLY DISINTEGRATING ORAL EVERY 6 HOURS PRN
Qty: 28 TABLET | Refills: 0 | Status: SHIPPED | OUTPATIENT
Start: 2023-07-19 | End: 2023-08-18

## 2023-07-19 RX ORDER — HYDROCODONE BITARTRATE AND ACETAMINOPHEN 7.5; 325 MG/1; MG/1
TABLET ORAL
Status: ON HOLD | COMMUNITY
Start: 2023-06-19 | End: 2023-10-02 | Stop reason: HOSPADM

## 2023-07-19 NOTE — PROGRESS NOTES
The scope used for the exam was:  Flexible scope ENF-P4  Serial Number:  1)    [id number]    []   2)    [id number]    []   3)    [id number]    [x]   4)    [id number]    []   5)    [id number]    []   6)    [id number]    []       The scope used for the exam was:  Rigid scope   Serial Number:  1)   [id number]    []   2)   6282    []   3)   7330    []   4)    3384   []   5)    0824   []   6)    5554   []     7)   7425    []   8)   2240    []   9)   1109    []

## 2023-07-19 NOTE — PROGRESS NOTES
[facility]  Otolaryngology Clinic Note    Yaron Brennan  Encounter Date: 7/19/2023  YOB: 1965  Physician: Adelina Klein    Chief Complaint: Otalgia, neck mass    HPI: Yaron Brennan is a 58 y.o. male with HTN who presents as referral from Dr. Welsh for squamous cell carcinoma of base of tongue. Patient reports that toward the beginning of the year he started to have worsening left otalgia. He then noticed a bump/swelling on left neck that he thought was was due to a bug bite. This got progressively bigger over time and he then noticed some swelling on right neck as well. He eventually presented to [facility] in early July 2022. He had a CT neck that showed an ulcerated mass in the tongue base, and underwent direct laryngoscopy with ENT on 7/6/22 (Dr. Tay). Operative findings: ulcerated friable mass base of the tongue that seems confined to base of tongue; lingual surface of epiglottis is free from tumor; mass involves bilateral base of tongue, significantly midline. Results returned positive for p16+ SCCa. Today in clinic patient reports bilateral L>R otalgia, mild dysphagia/odynophagia. He reports he eats basically whatever he wants without overt signs of aspiration. He does report 40lb weight loss in the last 6 months. + Neck LAD. Reports voice changes. Denies any issues breathing. Smoked 1ppd since 17 years old (42 years) but quit a month ago when he got his diagnosis. Also previously drank 12 pack of beer a day and likewise quite a month ago. Referred to ENT for consideration of possible surgical resection.     07/05/2022: CT soft tissue of the neck without contrast:  -ulcerated tongue base mass, at least 3.4 cm by 3.0 cm x 3.1 cm; associated bulky level 2 and level 3 cervical lymphadenopathy; suspected level 1 and level 4 cervical lymphadenopathy; a right level 2 lymph node with central necrosis 2.6 x 3.3 cm; a left level 3 lymph node 4.5 x 3.2 cm;  no significant airway compromise; no acute or aggressive bony lesions  -malignant ulcerated type mass with lymph node metastasis; no airway compromise; right upper lung lobe indistinct ground-glass pulmonary nodules typical of benign infectious or inflammatory etiology    8/16/22: Here today for follow up. Had dental extractions performed on 8/5. Not having any issues. Taking in 4-5 ensures daily. Trying to take some other soft foods like grits and pudding.     9/22/22: Here for follow up. Patient is undergoing CRT and tolerating it well. He reports he has one more chemotherapy session left and about 2-3 weeks of radiation left. Overall maintaining his weight. No progressive dysphagia, swallowing fine. Also no breathing issues or other complaints.    11/3/22: Patient is here today for follow up.  He finished CRT on 10/10.  Patient states he is still in some pain in  his neck and inside of his mouth.   He is tolerating p.o. intake but states at times he does have difficulty swallowing.  He does have xerostomia.  Patient has not used his eardrops to loosen the cerumen impaction.  Denies any shortness of breath or difficulty breathing/ changes to voice,  changes to his neck masses.    12/7/22:  Patient returns for surveillance today.  Patient denies any new dysphagia, odynophagia, H&N lumps or bumps, otalgia, weight loss.  Overall pain is getting better, tolerating diet, gaining weight.    03/14/2023 doing well, no pain, no otalgia, dysphagia has improved significantly, weight is stable.  No hemoptysis.  Voice remains hoarse.  No airway distress.    4/11/2023: Returns today for follow-up. Reports doing well overall. No dysphagia, swallowing all foods without issue. Voice stable. No odynophagia, no SOB, no otalgia. Does note feeling like ears are clogged, has tried ear drops without success. Did get TSH since last visit, wnl.    5/18/23: Patient here today for follow up. Doing well since last visit. No major concerns.  Denies weight loss, new lumps/bumps, sore throat, dysphagia, odynophagia, or otalgia. Having some pain when chewing. Does not have dentures.     23: Returns today for follow-up/surveillance. Patient reports doing well overall. No issues with dysphagia, odynophagia, voice changes, otalgia, or weight loss. Still looking for dentist to get dentures made. Did get CT neck since last visit, but CXR and TSH still pending.     23: Here for surveillance. He is doing well. He is swallowing food of all consistencies without issue - no coughing or choking. He denies voice changes, weight loss, pain, or new lumps/bumps. He is breathing comfortably and active. Having trouble with sleep at night.    Review of patient's allergies indicates:  No Known Allergies    Past Medical History:   Diagnosis Date    Cancer     Hypertension        Past Surgical History:   Procedure Laterality Date    BACK SURGERY      DIRECT LARYNGOSCOPY N/A 2022    Procedure: LARYNGOSCOPY, DIRECT;  Surgeon: Mekhi Grossman MD;  Location: [facility];  Service: ENT;  Laterality: N/A;    EXTRACTION OF TOOTH N/A 2022    Procedure: EXTRACTION, TEETH;  Surgeon: Mekhi Grossman MD;  Location: [facility];  Service: ENT;  Laterality: N/A;    SPINE SURGERY      Not sure of date maybe in        Social History     Socioeconomic History    Marital status:      Spouse name: Blanca   Occupational History    Occupation: Disabled   Tobacco Use    Smoking status: Former     Types: Cigarettes     Start date:      Quit date: 2022     Years since quittin.0    Smokeless tobacco: Never    Tobacco comments:     Been almost 35 yr- Using Nicotine patches- denies smoking at current time   Substance and Sexual Activity    Alcohol use: Not Currently    Drug use: Yes     Types: Marijuana     Comment: Daily    Sexual activity: Yes     Partners: Female     Birth control/protection: None     Social Determinants of Health     Transportation  Needs: No Transportation Needs    Lack of Transportation (Medical): No    Lack of Transportation (Non-Medical): No   Housing Stability: High Risk    Unable to Pay for Housing in the Last Year: Yes    Number of Places Lived in the Last Year: 2    Unstable Housing in the Last Year: No       Family History   Problem Relation Age of Onset    Hypertension Mother     Cancer Father     Hypertension Sister     Hypertension Brother        Outpatient Encounter Medications as of 7/19/2023   Medication Sig Dispense Refill    amLODIPine (NORVASC) 10 MG tablet Take 1 tablet (10 mg total) by mouth once daily. 90 tablet 3    HYDROcodone-acetaminophen (NORCO) 7.5-325 mg per tablet TAKE 1 TABLET BY MOUTH EVERY 6 HOURS AS NEEDED FOR PAIN FOR UP TO 3 DAYS      hydrocortisone (PROCTOCORT) 10 % (80 mg) rectal foam Place 1 applicator rectally 2 (two) times daily as needed (hemorrhoidal flare). 15 g 1    LIDOcaine HCl 2% (XYLOCAINE) 2 % Soln Swish and spit with 15 mL every 6 hours as needed. Do not swallow. 100 mL 0    methocarbamoL (ROBAXIN) 750 MG Tab TAKE 1 TABLET BY MOUTH IN THE MORNING AND 1 TABLET AT NOON AND 1 TABLET IN THE EVENING AND 1 TABLET BEFORE BEDTIME . DO ALL THIS FOR 4 DAYS      naloxone (NARCAN) 4 mg/actuation Spry SMARTSIG:Both Nares      nicotine (NICODERM CQ) 21 mg/24 hr 1 patch once daily.      ondansetron (ZOFRAN-ODT) 8 MG TbDL Take 1 tablet (8 mg total) by mouth every 8 (eight) hours as needed (nausea). 90 tablet 3    SPS, WITH SORBITOL, 15-20 gram/60 mL Susp Take by mouth.      mometasone (ELOCON) 0.1 % ointment Apply topically 2 (two) times daily.      traMADoL (ULTRAM) 50 mg tablet Take 50 mg by mouth every 6 (six) hours as needed.       Facility-Administered Encounter Medications as of 7/19/2023   Medication Dose Route Frequency Provider Last Rate Last Admin    lactated ringers infusion   Intravenous Continuous Elzbieta Quesada MD 10 mL/hr at 08/05/22 0808 New Bag at 08/05/22 0808    LIDOcaine (PF) 10 mg/ml  (1%) injection 10 mg  1 mL Intradermal Once Yajaira MedeirosPaola, FNP        LIDOcaine (PF) 40 mg/mL (4 %) injection 2 mL  2 mL Other 1 time in Clinic/HOD Tony Pepe MD        phenylephrine HCL 1% nasal spray 1 spray  1 spray Each Nostril 1 time in Clinic/HOD Tony Pepe MD        sodium chloride 0.9% flush 10 mL  10 mL Intravenous PRN Anna Taylor MD           Physical Exam:  BP (!) 144/80 (BP Location: Right arm, Patient Position: Sitting, BP Method: Large (Manual))   Pulse 96   Temp 99.2 °F (37.3 °C) (Oral)   Constitutional  General Appearance: well nourished, well-developed, AAO x3, NAD; voice slightly rough  HEENT  Eyes: PEERLA, EOMI, normal conjunctivae  Ears: Hearing well at conversation level;    AD: auricle normal,EAC clear, TM without lesions   AS: auricle normal, EAC clear, TM without lesions  Nose: septum midline, no inferior turbinate hypertrophy, no polyps, moist mucosa without erythema or blue hue  OC/OP: edentulous,  no oral lesions,  unable to fully palpate tongue/FOM/BOT due to very bad gag reflex- soft, no leukoplakia/ulcerations/ tenderness, good protrusion of tongue  Nasopharynx, Hypopharynx, and Larynx:    Indirect: attempted, limited view due to patient intolerance  Neck: soft neck bilaterally, no discrete lymph nodes appreciated. Postradiation induced edema  Neuro: CN II - XII intact bilaterally  Respiratory: non-labored respirations on RA  Psychiatric: oriented to time, place and person, no depression, anxiety or agitation    Flexible Fiberoptic Laryngoscopy/Nasopharyngoscopy via right naris  Anesthesia: Topical phenylephrine and lidocaine  Adverse Events: None  Indications: Dysphonia, history of chemoradiation therapy to head and neck  Resident Surgeon: Adelina Klein MD  Blood loss: none  Condition: good    Procedure in Detail: Informed consent was obtained from the patient after explanation of procedure, indications, risks and benefits. Flexible endoscopy was performed  on Yaron Brennan through the right nasal passages. The nasal cavity, nasopharynx, oropharynx, hypopharynx and larynx were adequately visualized. The true vocal cords and arytenoids were examined during phonation and repose.    Findings:  NP/OP: no masses/lesions of NC, eustachian tube, fossa of Rosenmuller, no adenoid hypertrophy  BOT/vallecula:  moderate edema, no signs of ulcerations or lesions, edema of bilateral lateral pharyngeal walls  Piriform sinuses/post-cricoid: limited view secondary to post XRT edema  Epiglottis: lingual and laryngeal surfaces within normal limits with radiation effect and curling of the epiglottis  Arytenoids/FVFs: no masses/lesions, significant post radiation edema, bilateral movement intact,  watery edema  TVCs: bilateral cord mobility, no masses or lesions  No aspiration, thick secretions; no aspiration noted  Patient does have postradiation changes throughout the larynx.      Pertinent Data:  CT neck 6/08/23  FINDINGS:  No definite measurable tongue mass on today's exam.  Slightly asymmetric thickening of the posterior tongue on the right greater than left with continued interval improvement.  Right level 2 cervical lymph node measures 7 mm on today's exam short axis image 52 series 2 compared to 1 cm on the prior.  Residual scattered lymph nodes small by imaging criteria.  Mucosal edema again seen along the region of the inferior pharynx, larynx went.  Superimposed malignancy at the level of the supraglottic trachea, distal hypopharyngeal region not excluded. Thyroid gland normal.  Vascular structures intact.  Mild treatment related subcutaneous adipose tissue edema, thickening anterior and lateral neck bilaterally.  Paraspinal muscles normal. Bones grossly intact.  Visualized intracranial structures normal.  Paranasal sinuses grossly a normal.  Intraorbital contents in normal.  Impression:  Continued improvement in the tongue mass with only slight asymmetric thickening at the  posterior tongue on the right.  No measurable mass.  Lymphadenopathy improved with the index node in the right level 2 region smaller in the interval.  No enlarged lymph nodes on today's exam.  Continued submucosal thickening at the pharynx and laryngeal level likely post treatment related changes with superimposed residual malignancy not excluded..    Assessment/Plan:  Yaron Brennan is a 58 y.o. 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 CRT, finished 10/10/22.   Currently doing well  following his CRT.    PET 1/11/23 without disease (in [facility] system), improving primary and LAD size on CT neck 1/31/23. TSH wnl 2/2/23. Exam stable compared to documentation of prior - FFL without masses or lesions today.    - He is up to date on labs and imaging. He is doing well overall.  - Melatonin PRN for insomnia. Recommended he discuss this issue with his PCP as well.  - Refill zofran.  - RTC in 4-6 weeks for surveillance. He knows to come sooner if he is having any issues.    Adelina Klein MD  Otolaryngology PGY-IV          HEAD & NECK CANCER SURVEILLANCE   Radiation and/or Chemotherapy     Medical Oncologist: Kevon Welsh MD  Radiation Oncologist: Unknown    Primary tumor: cT3 cN2 M0 p16+ base of tongue    Date of Diagnosis: 7/6/22  Therapy: Cisplatin 100 mg/m² IV days 1, 22, and 23, concurrent with RT  Completion Date: 10/10/22    Pertinent Treatment History:   First presentation 7/2022  CT neck 7/5/22 & 7/29/22  Panendoscopy 7/6/22  Right UE DVT 2/2 PICC 9/15/22 on Xarelto  CRT 8/25/22 - 10/10/22      Place date if completed   3 6 12 18 24 36 48 60   CT Neck 1/31/23 6/8/23 X X X X X X   PET/CT 1/11/23          CXR  7/17/23 X X X X X X   TFT 2/2/23 7/17/23 X  X X X X     Current Surveillance Interval:  <1 year; every 4-6 weeks                  Answers submitted by the patient for this visit:  Review of Symptoms  Questionnaire  (Submitted on 7/18/2023)  None of these: Yes  None of these: Yes  None of these : Yes  None of these: Yes  None of these : Yes  None of these: Yes  None of these: Yes  None of these : Yes  None of these: Yes  None of these : Yes  None of these: Yes  None of these: Yes  None of these: Yes

## 2023-07-20 ENCOUNTER — OFFICE VISIT (OUTPATIENT)
Dept: HEMATOLOGY/ONCOLOGY | Facility: CLINIC | Age: 58
End: 2023-07-20
Payer: MEDICAID

## 2023-07-20 VITALS
HEART RATE: 78 BPM | OXYGEN SATURATION: 100 % | DIASTOLIC BLOOD PRESSURE: 79 MMHG | HEIGHT: 70 IN | WEIGHT: 162.19 LBS | BODY MASS INDEX: 23.22 KG/M2 | TEMPERATURE: 98 F | RESPIRATION RATE: 18 BRPM | SYSTOLIC BLOOD PRESSURE: 129 MMHG

## 2023-07-20 DIAGNOSIS — G47.09 OTHER INSOMNIA: Primary | ICD-10-CM

## 2023-07-20 DIAGNOSIS — C77.0 SECONDARY MALIGNANT NEOPLASM OF CERVICAL LYMPH NODE: ICD-10-CM

## 2023-07-20 DIAGNOSIS — C01 SQUAMOUS CELL CARCINOMA OF BASE OF TONGUE: Primary | ICD-10-CM

## 2023-07-20 DIAGNOSIS — C01 SQUAMOUS CELL CARCINOMA OF BASE OF TONGUE: ICD-10-CM

## 2023-07-20 DIAGNOSIS — R19.5 POSITIVE COLORECTAL CANCER SCREENING USING COLOGUARD TEST: ICD-10-CM

## 2023-07-20 PROCEDURE — 1159F MED LIST DOCD IN RCRD: CPT | Mod: CPTII,,, | Performed by: NURSE PRACTITIONER

## 2023-07-20 PROCEDURE — 1160F RVW MEDS BY RX/DR IN RCRD: CPT | Mod: CPTII,,, | Performed by: NURSE PRACTITIONER

## 2023-07-20 PROCEDURE — 1160F PR REVIEW ALL MEDS BY PRESCRIBER/CLIN PHARMACIST DOCUMENTED: ICD-10-PCS | Mod: CPTII,,, | Performed by: NURSE PRACTITIONER

## 2023-07-20 PROCEDURE — 99214 OFFICE O/P EST MOD 30 MIN: CPT | Mod: PBBFAC | Performed by: NURSE PRACTITIONER

## 2023-07-20 PROCEDURE — 3078F PR MOST RECENT DIASTOLIC BLOOD PRESSURE < 80 MM HG: ICD-10-PCS | Mod: CPTII,,, | Performed by: NURSE PRACTITIONER

## 2023-07-20 PROCEDURE — 3074F SYST BP LT 130 MM HG: CPT | Mod: CPTII,,, | Performed by: NURSE PRACTITIONER

## 2023-07-20 PROCEDURE — 99214 OFFICE O/P EST MOD 30 MIN: CPT | Mod: S$PBB,,, | Performed by: NURSE PRACTITIONER

## 2023-07-20 PROCEDURE — 99214 PR OFFICE/OUTPT VISIT, EST, LEVL IV, 30-39 MIN: ICD-10-PCS | Mod: S$PBB,,, | Performed by: NURSE PRACTITIONER

## 2023-07-20 PROCEDURE — 3008F PR BODY MASS INDEX (BMI) DOCUMENTED: ICD-10-PCS | Mod: CPTII,,, | Performed by: NURSE PRACTITIONER

## 2023-07-20 PROCEDURE — 3008F BODY MASS INDEX DOCD: CPT | Mod: CPTII,,, | Performed by: NURSE PRACTITIONER

## 2023-07-20 PROCEDURE — 3078F DIAST BP <80 MM HG: CPT | Mod: CPTII,,, | Performed by: NURSE PRACTITIONER

## 2023-07-20 PROCEDURE — 1159F PR MEDICATION LIST DOCUMENTED IN MEDICAL RECORD: ICD-10-PCS | Mod: CPTII,,, | Performed by: NURSE PRACTITIONER

## 2023-07-20 PROCEDURE — 3074F PR MOST RECENT SYSTOLIC BLOOD PRESSURE < 130 MM HG: ICD-10-PCS | Mod: CPTII,,, | Performed by: NURSE PRACTITIONER

## 2023-07-20 RX ORDER — TRAZODONE HYDROCHLORIDE 100 MG/1
100 TABLET ORAL NIGHTLY PRN
Qty: 30 TABLET | Refills: 0 | Status: SHIPPED | OUTPATIENT
Start: 2023-07-20 | End: 2023-08-22 | Stop reason: SDUPTHER

## 2023-07-20 NOTE — PROGRESS NOTES
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 Intermittent alcohol use.  Procedure/surgical history: Back surgery (20-30 years ago, in use 10, apparently involving L5 fusion for prolapse this from work-related injury).  Social history:  Has been smoking a pack of cigarettes daily since age 17 years.  Has been drinking 6 beers daily for last 40 years.  Lately, trying to quit smoking and drinking.  Smokes marijuana.  .  Has 2 biological children of his own.  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  Lives in [place], [place]..  Family history:  Father  from some kind of cancer at age 72 years.  Health maintenance:  Does not have a regular primary care provider.  Does not visit with doctors.  No screening colonoscopy ever.     History of Present illness:  57-year-old gentleman   He was admitted to [facility] 2022 with 2 months history of progressive , 20 lb weight loss and bilateral neck masses.  Also, hoarseness.  CT neck showed ulcerated mass in the tongue base, malignant-appearing, along with lymph node metastasis.  ENT performed a laryngoscopy with biopsy of tongue mass.  Friable base of tongue mass.  No airway compromise.  Right upper lung lobe indistinct, ground-glass lung nodules, typical of a benign inflammatory etiology.  Discharged 2022.  Patient reported weight loss of> 20 lb in previous 2 months.    Interval History 2023:  patient presents alone for scheduled follow up visit. He is currently under surveillance for squamous cell carcinoma of base of tongue. Patient says he feels good, great appetite. He denies dysphagia, odynophagia, soreness of tongue, hoarseness, cough, lymphadenopathy, fever or signs of  infection. Lab work reviewed with patient, stable. Slightly elevated serum Cr. Advised to increase hydration with water. Discussed scheduled follow up appointments with patient.     Review of Systems: A complete 12-point ROS was performed in full with pertinent positives as described in interval history. Remainder of ROS done in full and are negative.    Lab Results   Component Value Date    WBC 4.59 07/20/2023    RBC 3.92 (L) 07/20/2023    HGB 12.3 (L) 07/20/2023    HCT 36.7 (L) 07/20/2023    MCV 93.6 07/20/2023    MCH 31.4 (H) 07/20/2023    MCHC 33.5 07/20/2023    RDW 13.2 07/20/2023     07/20/2023    MPV 8.8 07/20/2023     CMP  Sodium Level   Date Value Ref Range Status   07/20/2023 142 136 - 145 mmol/L Final     Potassium Level   Date Value Ref Range Status   07/20/2023 3.8 3.5 - 5.1 mmol/L Final     Carbon Dioxide   Date Value Ref Range Status   07/20/2023 25 22 - 29 mmol/L Final     Blood Urea Nitrogen   Date Value Ref Range Status   07/20/2023 12.3 8.4 - 25.7 mg/dL Final     Creatinine   Date Value Ref Range Status   07/20/2023 1.27 (H) 0.73 - 1.18 mg/dL Final     Calcium Level Total   Date Value Ref Range Status   07/20/2023 9.4 8.4 - 10.2 mg/dL Final     Albumin Level   Date Value Ref Range Status   07/20/2023 4.1 3.5 - 5.0 g/dL Final     Bilirubin Total   Date Value Ref Range Status   07/20/2023 0.5 <=1.5 mg/dL Final     Alkaline Phosphatase   Date Value Ref Range Status   07/20/2023 66 40 - 150 unit/L Final     Aspartate Aminotransferase   Date Value Ref Range Status   07/20/2023 13 5 - 34 unit/L Final     Alanine Aminotransferase   Date Value Ref Range Status   07/20/2023 8 0 - 55 unit/L Final     eGFR   Date Value Ref Range Status   07/20/2023 >60 mls/min/1.73/m2 Final         Physical Exam  Vitals:    07/20/23 1309   BP: 129/79   Pulse: 78   Resp: 18   Temp: 98.1 °F (36.7 °C)     General: Alert and oriented. NAD  Eye: Pupils are equal, round and reactive to light, Extraocular movements are  intact. Normal conjunctiva  HENT: Normocephalic. Oropharynx exam deferred;  Neck: Supple, Non-tender  Respiratory: Respirations are non-labored, Symmetrical chest wall expansion.   Cardiovascular: Regular rate, rhythm, Normal peripheral perfusion, No bilateral lower extremity edema  Gastrointestinal: Non-distended, Present bowel sounds   Genitourinary: Exam deferred  Lymphatics: No lymphadenopathy appreciated  Musculoskeletal: Moves all extremities  Integumentary: Intact. Warm, dry. No rashes, or lesions to visible skin  Neurologic: No focal deficits  Psychiatric: Cooperative. Appropriate mood and affect   ECOG Performance Scale: 0 - Capable of all self-care able no restrictions    Assessment:  # 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, p16 +:  -presentation: 07/2022:  Dysphagia, odynophagia, 20 lb weight loss, bilateral neck masses, hoarseness  -CT soft tissues of the neck without contrast 07/05/2022, 07/29/2022  -S/P panendoscopy 07/06/2022  -ulcerated tongue base mass, at least 5 cm  -ulcerated friable mass base of the tongue, confined base of tongue on panendoscopy  -right level 2 lymph node 3.2 x 2.9 cm on CT  -left level 3 lymph node 5.1 x 4.2 cm on CT  -invasive squamous cell carcinoma, grade 3 of 4; p16 +  -no intrathoracic metastasis on CT chest  >>cT3 cN2 M0, clinical stage II     # Extensive DVT right upper extremity, including subclavian vein, axillary vein, and basilic vein   - on 09/15/2022, secondary to PICC line  -right-sided PICC line removed; MediPort was unable to place; chemotherapy is being continued with PICC line on the left side   -on Xarelto    # History of tobacco abuse since age 17  # History of alcohol use  -strongly advised on cessation of both the above       Plan:  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, p16 +  -S/P high-dose cisplatin every 3 weeks x3 (08/25/2022-10/11/2022)  -S/P radiotherapy 08/24/2022-10/11/2022 (7000 cGy; 35 fractions; 48 elapsed days)  -11/21/2022:  Cologuard +  -01/11/2023: Restaging PET-CT:  Complete response  >>>  Continue Surveillance   Regular follow-up with ENT last seen 7/19/23  We will follow him peripherally  Follow-up w/NP in 6 months, with CBC, CMP, and TSH      Insomnia  7/20/2023: Difficulty sleeping ever since completing chemoradiation  Start Trazodone 100mg po hs prn      -developed extensive DVT right upper extremity, including subclavian vein, axillary vein, and basilic vein 09/15/2022, secondary to PICC line  -right-sided PICC line removed; MediPort was unable to be placed; chemotherapy is being continued with PICC line on the left side   -anticoagulation with Xarelto started 09/15/2022  -anticoagulation to continue for at least 3 months (until the middle/end of December ' 22)  (He stopped Xarelto 12/15/2022, appropriately so)     -11/21/2022: Cologuard +  Refer to GI for colonoscopy-pending     -abstinence from smoking and alcohol discussed once again and strongly encouraged.     Above discussed with him.  All questions answered.    He and his wife understand and agree with this plan.

## 2023-08-16 ENCOUNTER — OFFICE VISIT (OUTPATIENT)
Dept: OTOLARYNGOLOGY | Facility: CLINIC | Age: 58
End: 2023-08-16
Payer: MEDICAID

## 2023-08-16 VITALS
OXYGEN SATURATION: 98 % | BODY MASS INDEX: 24.38 KG/M2 | HEIGHT: 69 IN | DIASTOLIC BLOOD PRESSURE: 67 MMHG | HEART RATE: 78 BPM | SYSTOLIC BLOOD PRESSURE: 138 MMHG | RESPIRATION RATE: 20 BRPM | TEMPERATURE: 98 F | WEIGHT: 164.63 LBS

## 2023-08-16 DIAGNOSIS — Z85.819 HISTORY OF OROPHARYNGEAL CANCER: Primary | ICD-10-CM

## 2023-08-16 DIAGNOSIS — C01 SQUAMOUS CELL CARCINOMA OF BASE OF TONGUE: ICD-10-CM

## 2023-08-16 PROCEDURE — 99215 OFFICE O/P EST HI 40 MIN: CPT | Mod: PBBFAC

## 2023-08-16 NOTE — PROGRESS NOTES
[facility]  Otolaryngology Clinic Note    Yaron Brennan  Encounter Date: 8/16/2023  YOB: 1965  Physician: Adelina Klein    Chief Complaint: Otalgia, neck mass    HPI: Yaron Brennan is a 58 y.o. male with HTN who presents as referral from Dr. Welsh for squamous cell carcinoma of base of tongue. Patient reports that toward the beginning of the year he started to have worsening left otalgia. He then noticed a bump/swelling on left neck that he thought was was due to a bug bite. This got progressively bigger over time and he then noticed some swelling on right neck as well. He eventually presented to [facility] in early July 2022. He had a CT neck that showed an ulcerated mass in the tongue base, and underwent direct laryngoscopy with ENT on 7/6/22 (Dr. Tay). Operative findings: ulcerated friable mass base of the tongue that seems confined to base of tongue; lingual surface of epiglottis is free from tumor; mass involves bilateral base of tongue, significantly midline. Results returned positive for p16+ SCCa. Today in clinic patient reports bilateral L>R otalgia, mild dysphagia/odynophagia. He reports he eats basically whatever he wants without overt signs of aspiration. He does report 40lb weight loss in the last 6 months. + Neck LAD. Reports voice changes. Denies any issues breathing. Smoked 1ppd since 17 years old (42 years) but quit a month ago when he got his diagnosis. Also previously drank 12 pack of beer a day and likewise quite a month ago. Referred to ENT for consideration of possible surgical resection.     07/05/2022: CT soft tissue of the neck without contrast:  -ulcerated tongue base mass, at least 3.4 cm by 3.0 cm x 3.1 cm; associated bulky level 2 and level 3 cervical lymphadenopathy; suspected level 1 and level 4 cervical lymphadenopathy; a right level 2 lymph node with central necrosis 2.6 x 3.3 cm; a left level 3 lymph node 4.5 x 3.2 cm;  no significant airway compromise; no acute or aggressive bony lesions  -malignant ulcerated type mass with lymph node metastasis; no airway compromise; right upper lung lobe indistinct ground-glass pulmonary nodules typical of benign infectious or inflammatory etiology    8/16/22: Here today for follow up. Had dental extractions performed on 8/5. Not having any issues. Taking in 4-5 ensures daily. Trying to take some other soft foods like grits and pudding.     9/22/22: Here for follow up. Patient is undergoing CRT and tolerating it well. He reports he has one more chemotherapy session left and about 2-3 weeks of radiation left. Overall maintaining his weight. No progressive dysphagia, swallowing fine. Also no breathing issues or other complaints.    11/3/22: Patient is here today for follow up.  He finished CRT on 10/10.  Patient states he is still in some pain in  his neck and inside of his mouth.   He is tolerating p.o. intake but states at times he does have difficulty swallowing.  He does have xerostomia.  Patient has not used his eardrops to loosen the cerumen impaction.  Denies any shortness of breath or difficulty breathing/ changes to voice,  changes to his neck masses.    12/7/22:  Patient returns for surveillance today.  Patient denies any new dysphagia, odynophagia, H&N lumps or bumps, otalgia, weight loss.  Overall pain is getting better, tolerating diet, gaining weight.    03/14/2023 doing well, no pain, no otalgia, dysphagia has improved significantly, weight is stable.  No hemoptysis.  Voice remains hoarse.  No airway distress.    4/11/2023: Returns today for follow-up. Reports doing well overall. No dysphagia, swallowing all foods without issue. Voice stable. No odynophagia, no SOB, no otalgia. Does note feeling like ears are clogged, has tried ear drops without success. Did get TSH since last visit, wnl.    5/18/23: Patient here today for follow up. Doing well since last visit. No major concerns.  Denies weight loss, new lumps/bumps, sore throat, dysphagia, odynophagia, or otalgia. Having some pain when chewing. Does not have dentures.     23: Returns today for follow-up/surveillance. Patient reports doing well overall. No issues with dysphagia, odynophagia, voice changes, otalgia, or weight loss. Still looking for dentist to get dentures made. Did get CT neck since last visit, but CXR and TSH still pending.     23: Here for surveillance. He is doing well. He is swallowing food of all consistencies without issue - no coughing or choking. He denies voice changes, weight loss, pain, or new lumps/bumps. He is breathing comfortably and active. Having trouble with sleep at night.    23: He has been doing very well since his last visit no issues at all.  He has eating a regular diet and no issues swallowing.  No voice changes lumps or bumps in her neck.    Review of patient's allergies indicates:  No Known Allergies    Past Medical History:   Diagnosis Date    Cancer     Hypertension        Past Surgical History:   Procedure Laterality Date    BACK SURGERY      DIRECT LARYNGOSCOPY N/A 2022    Procedure: LARYNGOSCOPY, DIRECT;  Surgeon: Mekhi Grossman MD;  Location: [facility];  Service: ENT;  Laterality: N/A;    EXTRACTION OF TOOTH N/A 2022    Procedure: EXTRACTION, TEETH;  Surgeon: Mekhi Grossman MD;  Location: [facility];  Service: ENT;  Laterality: N/A;    SPINE SURGERY      Not sure of date maybe in        Social History     Socioeconomic History    Marital status:      Spouse name: Blanca   Occupational History    Occupation: Disabled   Tobacco Use    Smoking status: Former     Current packs/day: 0.00     Types: Cigarettes     Start date:      Quit date: 2022     Years since quittin.1    Smokeless tobacco: Never    Tobacco comments:     Been almost 35 yr- Using Nicotine patches- denies smoking at current time   Substance and Sexual Activity    Alcohol use:  Not Currently    Drug use: Yes     Types: Marijuana     Comment: Daily    Sexual activity: Yes     Partners: Female     Birth control/protection: None     Social Determinants of Health     Transportation Needs: No Transportation Needs (12/1/2022)    PRAPARE - Transportation     Lack of Transportation (Medical): No     Lack of Transportation (Non-Medical): No   Housing Stability: High Risk (12/1/2022)    Housing Stability Vital Sign     Unable to Pay for Housing in the Last Year: Yes     Number of Places Lived in the Last Year: 2     Unstable Housing in the Last Year: No       Family History   Problem Relation Age of Onset    Hypertension Mother     Cancer Father     Hypertension Sister     Hypertension Brother        Outpatient Encounter Medications as of 8/16/2023   Medication Sig Dispense Refill    amLODIPine (NORVASC) 10 MG tablet Take 1 tablet (10 mg total) by mouth once daily. 90 tablet 3    HYDROcodone-acetaminophen (NORCO) 7.5-325 mg per tablet TAKE 1 TABLET BY MOUTH EVERY 6 HOURS AS NEEDED FOR PAIN FOR UP TO 3 DAYS      hydrocortisone (PROCTOCORT) 10 % (80 mg) rectal foam Place 1 applicator rectally 2 (two) times daily as needed (hemorrhoidal flare). 15 g 1    LIDOcaine HCl 2% (XYLOCAINE) 2 % Soln Swish and spit with 15 mL every 6 hours as needed. Do not swallow. 100 mL 0    melatonin (MELATIN) 3 mg tablet Take 2 tablets (6 mg total) by mouth nightly as needed for Insomnia. 30 tablet 0    methocarbamoL (ROBAXIN) 750 MG Tab TAKE 1 TABLET BY MOUTH IN THE MORNING AND 1 TABLET AT NOON AND 1 TABLET IN THE EVENING AND 1 TABLET BEFORE BEDTIME . DO ALL THIS FOR 4 DAYS      mometasone (ELOCON) 0.1 % ointment Apply topically 2 (two) times daily.      naloxone (NARCAN) 4 mg/actuation Spry SMARTSIG:Both Nares      nicotine (NICODERM CQ) 21 mg/24 hr 1 patch once daily.      ondansetron (ZOFRAN-ODT) 4 MG TbDL Take 2 tablets (8 mg total) by mouth every 6 (six) hours as needed. 28 tablet 0    SPS, WITH SORBITOL, 15-20  "gram/60 mL Susp Take by mouth.      traMADoL (ULTRAM) 50 mg tablet Take 50 mg by mouth every 6 (six) hours as needed.      traZODone (DESYREL) 100 MG tablet Take 1 tablet (100 mg total) by mouth nightly as needed for Insomnia. 30 tablet 0    [DISCONTINUED] ondansetron (ZOFRAN-ODT) 8 MG TbDL Take 1 tablet (8 mg total) by mouth every 8 (eight) hours as needed (nausea). 90 tablet 3     Facility-Administered Encounter Medications as of 8/16/2023   Medication Dose Route Frequency Provider Last Rate Last Admin    lactated ringers infusion   Intravenous Continuous Elzbieta Quesada MD 10 mL/hr at 08/05/22 0808 New Bag at 08/05/22 0808    LIDOcaine (PF) 10 mg/ml (1%) injection 10 mg  1 mL Intradermal Once Yajaira Watts FNP        LIDOcaine (PF) 40 mg/mL (4 %) injection 2 mL  2 mL Other 1 time in Clinic/HOD Tony Pepe MD        phenylephrine HCL 1% nasal spray 1 spray  1 spray Each Nostril 1 time in Clinic/HOD Tony Pepe MD        sodium chloride 0.9% flush 10 mL  10 mL Intravenous PRN Anna Taylor MD           Physical Exam:  /67 (BP Location: Right arm, Patient Position: Sitting, BP Method: Medium (Automatic))   Pulse 78   Temp 98.2 °F (36.8 °C) (Oral)   Resp 20   Ht 5' 9" (1.753 m)   Wt 74.7 kg (164 lb 9.6 oz)   SpO2 98%   BMI 24.31 kg/m²   Constitutional  General Appearance: well nourished, well-developed, AAO x3, NAD; voice slightly rough  HEENT  Eyes: PEERLA, EOMI, normal conjunctivae  Ears: Hearing well at conversation level;    AD: auricle normal, EAC with obstructive cerumen   AS: auricle normal, EAC clear, TM without lesions  Nose: septum midline, no inferior turbinate hypertrophy, no polyps, moist mucosa without erythema or blue hue  OC/OP: edentulous,  no oral lesions,  unable to fully palpate tongue/FOM/BOT due to very bad gag reflex- soft, no leukoplakia/ulcerations/ tenderness, good protrusion of tongue  Nasopharynx, Hypopharynx, and Larynx:    Indirect: attempted, " limited view due to patient intolerance  Neck: soft neck bilaterally, no discrete lymph nodes appreciated. Postradiation induced edema  Neuro: CN II - XII intact bilaterally  Respiratory: non-labored respirations on RA  Psychiatric: oriented to time, place and person, no depression, anxiety or agitation    Flexible Fiberoptic Laryngoscopy/Nasopharyngoscopy via right naris  Anesthesia: None  Adverse Events: None  Indications: Dysphonia, history of chemoradiation therapy to head and neck  Resident Surgeon: Adelina Klein MD  Blood loss: none  Condition: good    Procedure in Detail: Informed consent was obtained from the patient after explanation of procedure, indications, risks and benefits. Flexible endoscopy was performed on Yaron Brennan through the right nasal passages. The nasal cavity, nasopharynx, oropharynx, hypopharynx and larynx were adequately visualized. The true vocal cords and arytenoids were examined during phonation and repose.    Findings:  NP/OP: no masses/lesions of NC, eustachian tube, fossa of Rosenmuller, no adenoid hypertrophy  BOT/vallecula:  moderate edema, no signs of ulcerations or lesions, edema of bilateral lateral pharyngeal walls  Piriform sinuses/post-cricoid: limited view secondary to post XRT edema  Epiglottis: radiation effect and curling of the epiglottis, laryngeal surface of epiglottis on right with small polyp appearing mass  Arytenoids/FVFs: no masses/lesions, significant post radiation edema, bilateral movement intact,  watery edema  TVCs: bilateral cord mobility, no masses or lesions  No aspiration, thick secretions; no aspiration noted  Patient does have postradiation changes throughout the larynx.      Pertinent Data:  CT neck 6/08/23  FINDINGS:  No definite measurable tongue mass on today's exam.  Slightly asymmetric thickening of the posterior tongue on the right greater than left with continued interval improvement.  Right level 2 cervical lymph node measures 7 mm  on today's exam short axis image 52 series 2 compared to 1 cm on the prior.  Residual scattered lymph nodes small by imaging criteria.  Mucosal edema again seen along the region of the inferior pharynx, larynx went.  Superimposed malignancy at the level of the supraglottic trachea, distal hypopharyngeal region not excluded. Thyroid gland normal.  Vascular structures intact.  Mild treatment related subcutaneous adipose tissue edema, thickening anterior and lateral neck bilaterally.  Paraspinal muscles normal. Bones grossly intact.  Visualized intracranial structures normal.  Paranasal sinuses grossly a normal.  Intraorbital contents in normal.  Impression:  Continued improvement in the tongue mass with only slight asymmetric thickening at the posterior tongue on the right.  No measurable mass.  Lymphadenopathy improved with the index node in the right level 2 region smaller in the interval.  No enlarged lymph nodes on today's exam.  Continued submucosal thickening at the pharynx and laryngeal level likely post treatment related changes with superimposed residual malignancy not excluded..    Assessment/Plan:  Yaron Brennan is a 58 y.o. 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 CRT, finished 10/10/22.   Currently doing well  following his CRT.    PET 1/11/23 without disease (in [facility] system), improving primary and LAD size on CT neck 1/31/23. TSH wnl 2/2/23.  FFL today with small polyp appearing mass on the right side of the laryngeal surface of his epiglottis.     - Plan to get CT scan and have him come back after for review of imaging and repeat FFL.  - If the polyp is still present at the next visit, we discussed the need for proceeding to the OR for a biopsy.   - RTC in 3 weeks after scan.     Adelina Klein MD  Otolaryngology PGY-IV          HEAD & NECK CANCER SURVEILLANCE   Radiation and/or Chemotherapy      Medical Oncologist: Kevon Welsh MD  Radiation Oncologist: Unknown    Primary tumor: cT3 cN2 M0 p16+ base of tongue    Date of Diagnosis: 7/6/22  Therapy: Cisplatin 100 mg/m² IV days 1, 22, and 23, concurrent with RT  Completion Date: 10/10/22    Pertinent Treatment History:   First presentation 7/2022  CT neck 7/5/22 & 7/29/22  Panendoscopy 7/6/22  Right UE DVT 2/2 PICC 9/15/22 on Xarelto  CRT 8/25/22 - 10/10/22      Place date if completed   3 6 12 18 24 36 48 60   CT Neck 1/31/23 6/8/23 X X X X X X   PET/CT 1/11/23          CXR  7/17/23 X X X X X X   TFT 2/2/23 7/17/23 X  X X X X     Current Surveillance Interval:  <1 year; every 4-6 weeks

## 2023-08-16 NOTE — PROGRESS NOTES
The scope used for the exam was:  Flexible scope ENF-P4  Serial Number:  1)    [id number]    []   2)    [id number]    []   3)    [id number]    []   4)    [id number]    [x]   5)    [id number]    []   6)    [id number]    []       The scope used for the exam was:  Rigid scope   Serial Number:  1)   [id number]    []   2)   6282    []   3)   7330    []   4)    3384   []   5)    0824   []   6)    5554   []     7)   7425    []   8)   2240    []   9)   1109    []

## 2023-08-22 DIAGNOSIS — G47.09 OTHER INSOMNIA: ICD-10-CM

## 2023-08-22 RX ORDER — TRAZODONE HYDROCHLORIDE 100 MG/1
100 TABLET ORAL NIGHTLY PRN
Qty: 30 TABLET | Refills: 0 | Status: SHIPPED | OUTPATIENT
Start: 2023-08-22 | End: 2023-09-21 | Stop reason: SDUPTHER

## 2023-08-25 ENCOUNTER — HOSPITAL ENCOUNTER (OUTPATIENT)
Dept: RADIOLOGY | Facility: HOSPITAL | Age: 58
Discharge: HOME | End: 2023-08-25
Attending: STUDENT IN AN ORGANIZED HEALTH CARE EDUCATION/TRAINING PROGRAM
Payer: MEDICAID

## 2023-08-25 DIAGNOSIS — Z85.819 HISTORY OF OROPHARYNGEAL CANCER: ICD-10-CM

## 2023-08-25 DIAGNOSIS — C01 SQUAMOUS CELL CARCINOMA OF BASE OF TONGUE: ICD-10-CM

## 2023-08-25 LAB
CREAT SERPL-MCNC: 1.14 MG/DL (ref 0.73–1.18)
GFR SERPLBLD CREATININE-BSD FMLA CKD-EPI: >60 MLS/MIN/1.73/M2

## 2023-08-25 PROCEDURE — 25500020 PHARM REV CODE 255

## 2023-08-25 PROCEDURE — 70491 CT SOFT TISSUE NECK W/DYE: CPT | Mod: TC

## 2023-08-25 PROCEDURE — 82565 ASSAY OF CREATININE: CPT | Performed by: STUDENT IN AN ORGANIZED HEALTH CARE EDUCATION/TRAINING PROGRAM

## 2023-08-25 RX ADMIN — IOHEXOL 75 ML: 350 INJECTION, SOLUTION INTRAVENOUS at 09:08

## 2023-09-19 ENCOUNTER — OFFICE VISIT (OUTPATIENT)
Dept: OTOLARYNGOLOGY | Facility: CLINIC | Age: 58
End: 2023-09-19
Payer: MEDICAID

## 2023-09-19 VITALS
RESPIRATION RATE: 18 BRPM | DIASTOLIC BLOOD PRESSURE: 93 MMHG | HEART RATE: 77 BPM | BODY MASS INDEX: 24.53 KG/M2 | WEIGHT: 165.63 LBS | SYSTOLIC BLOOD PRESSURE: 157 MMHG | TEMPERATURE: 98 F | HEIGHT: 69 IN | OXYGEN SATURATION: 99 %

## 2023-09-19 DIAGNOSIS — C76.0 HEAD AND NECK MALIGNANCY: Primary | ICD-10-CM

## 2023-09-19 PROCEDURE — 31575 DIAGNOSTIC LARYNGOSCOPY: CPT | Mod: PBBFAC | Performed by: STUDENT IN AN ORGANIZED HEALTH CARE EDUCATION/TRAINING PROGRAM

## 2023-09-19 PROCEDURE — 99215 OFFICE O/P EST HI 40 MIN: CPT | Mod: PBBFAC

## 2023-09-19 RX ORDER — SODIUM CHLORIDE 0.9 % (FLUSH) 0.9 %
10 SYRINGE (ML) INJECTION
Status: DISPENSED | OUTPATIENT
Start: 2023-09-19

## 2023-09-19 RX ORDER — LIDOCAINE HYDROCHLORIDE 10 MG/ML
1 INJECTION, SOLUTION EPIDURAL; INFILTRATION; INTRACAUDAL; PERINEURAL ONCE
Status: CANCELLED | OUTPATIENT
Start: 2023-09-19 | End: 2023-09-19

## 2023-09-19 NOTE — PROGRESS NOTES
The scope used for the exam was:  Flexible scope ENF-P4  Serial Number:  1)    [id number]    []   2)    [id number]    []   3)    [id number]    []   4)    [id number]    [x]   5)    [id number]    []   6)    [id number]    []       The scope used for the exam was:  Rigid scope   Serial Number:  1)   [id number]    []   2)   6282    []   3)   7330    []   4)    3384   []   5)    0824   []   6)    5554   []     7)   7425    []   8)   2240    []   9)   1109    []

## 2023-09-19 NOTE — H&P (VIEW-ONLY)
[facility] and [facility]  Otolaryngology Clinic Note    Yaron Brennan  Encounter Date: 9/19/2023  YOB: 1965  Physician: Jose Johns    Chief Complaint: Otalgia, neck mass    HPI: Yaron Brennan is a 58 y.o. male with HTN who presents as referral from Dr. Welsh for squamous cell carcinoma of base of tongue. Patient reports that toward the beginning of the year he started to have worsening left otalgia. He then noticed a bump/swelling on left neck that he thought was was due to a bug bite. This got progressively bigger over time and he then noticed some swelling on right neck as well. He eventually presented to [facility] in early July 2022. He had a CT neck that showed an ulcerated mass in the tongue base, and underwent direct laryngoscopy with ENT on 7/6/22 (Dr. Tay). Operative findings: ulcerated friable mass base of the tongue that seems confined to base of tongue; lingual surface of epiglottis is free from tumor; mass involves bilateral base of tongue, significantly midline. Results returned positive for p16+ SCCa. Today in clinic patient reports bilateral L>R otalgia, mild dysphagia/odynophagia. He reports he eats basically whatever he wants without overt signs of aspiration. He does report 40lb weight loss in the last 6 months. + Neck LAD. Reports voice changes. Denies any issues breathing. Smoked 1ppd since 17 years old (42 years) but quit a month ago when he got his diagnosis. Also previously drank 12 pack of beer a day and likewise quite a month ago. Referred to ENT for consideration of possible surgical resection.     07/05/2022: CT soft tissue of the neck without contrast:  -ulcerated tongue base mass, at least 3.4 cm by 3.0 cm x 3.1 cm; associated bulky level 2 and level 3 cervical lymphadenopathy; suspected level 1 and level 4 cervical lymphadenopathy; a right level 2 lymph node with central necrosis 2.6 x 3.3 cm; a left level 3 lymph node 4.5 x 3.2 cm; no  significant airway compromise; no acute or aggressive bony lesions  -malignant ulcerated type mass with lymph node metastasis; no airway compromise; right upper lung lobe indistinct ground-glass pulmonary nodules typical of benign infectious or inflammatory etiology    8/16/22: Here today for follow up. Had dental extractions performed on 8/5. Not having any issues. Taking in 4-5 ensures daily. Trying to take some other soft foods like grits and pudding.     9/22/22: Here for follow up. Patient is undergoing CRT and tolerating it well. He reports he has one more chemotherapy session left and about 2-3 weeks of radiation left. Overall maintaining his weight. No progressive dysphagia, swallowing fine. Also no breathing issues or other complaints.    11/3/22: Patient is here today for follow up.  He finished CRT on 10/10.  Patient states he is still in some pain in  his neck and inside of his mouth.   He is tolerating p.o. intake but states at times he does have difficulty swallowing.  He does have xerostomia.  Patient has not used his eardrops to loosen the cerumen impaction.  Denies any shortness of breath or difficulty breathing/ changes to voice,  changes to his neck masses.    12/7/22:  Patient returns for surveillance today.  Patient denies any new dysphagia, odynophagia, H&N lumps or bumps, otalgia, weight loss.  Overall pain is getting better, tolerating diet, gaining weight.    03/14/2023 doing well, no pain, no otalgia, dysphagia has improved significantly, weight is stable.  No hemoptysis.  Voice remains hoarse.  No airway distress.    4/11/2023: Returns today for follow-up. Reports doing well overall. No dysphagia, swallowing all foods without issue. Voice stable. No odynophagia, no SOB, no otalgia. Does note feeling like ears are clogged, has tried ear drops without success. Did get TSH since last visit, wnl.    5/18/23: Patient here today for follow up. Doing well since last visit. No major concerns.  Denies weight loss, new lumps/bumps, sore throat, dysphagia, odynophagia, or otalgia. Having some pain when chewing. Does not have dentures.     6/13/23: Returns today for follow-up/surveillance. Patient reports doing well overall. No issues with dysphagia, odynophagia, voice changes, otalgia, or weight loss. Still looking for dentist to get dentures made. Did get CT neck since last visit, but CXR and TSH still pending.     7/19/23: Here for surveillance. He is doing well. He is swallowing food of all consistencies without issue - no coughing or choking. He denies voice changes, weight loss, pain, or new lumps/bumps. He is breathing comfortably and active. Having trouble with sleep at night.    8/16/23: He has been doing very well since his last visit no issues at all.  He has eating a regular diet and no issues swallowing.  No voice changes lumps or bumps in her neck.    9/19/23:  Here today for follow-up after CT.  No issues in the interim.  Dysphagia and odynophagia are stable: Able to eat most things except for tough meat.  Weight has been stable.  No noted lumps/bumps.    Review of patient's allergies indicates:  No Known Allergies    Past Medical History:   Diagnosis Date    Cancer     Hypertension        Past Surgical History:   Procedure Laterality Date    BACK SURGERY      DIRECT LARYNGOSCOPY N/A 08/05/2022    Procedure: LARYNGOSCOPY, DIRECT;  Surgeon: Mekhi Grossman MD;  Location: [facility];  Service: ENT;  Laterality: N/A;    EXTRACTION OF TOOTH N/A 08/05/2022    Procedure: EXTRACTION, TEETH;  Surgeon: Mekhi Grossman MD;  Location: [facility];  Service: ENT;  Laterality: N/A;    SPINE SURGERY  2000    Not sure of date maybe in 2000       Social History     Socioeconomic History    Marital status:      Spouse name: Blanca   Occupational History    Occupation: Disabled   Tobacco Use    Smoking status: Former     Current packs/day: 0.00     Types: Cigarettes     Start date: 1982     Quit date:  2022     Years since quittin.2    Smokeless tobacco: Never    Tobacco comments:     Been almost 35 yr- Using Nicotine patches- denies smoking at current time   Substance and Sexual Activity    Alcohol use: Not Currently    Drug use: Yes     Types: Marijuana     Comment: Daily    Sexual activity: Yes     Partners: Female     Birth control/protection: None     Social Determinants of Health     Transportation Needs: No Transportation Needs (2022)    PRAPARE - Transportation     Lack of Transportation (Medical): No     Lack of Transportation (Non-Medical): No   Housing Stability: High Risk (2022)    Housing Stability Vital Sign     Unable to Pay for Housing in the Last Year: Yes     Number of Places Lived in the Last Year: 2     Unstable Housing in the Last Year: No       Family History   Problem Relation Age of Onset    Hypertension Mother     Cancer Father     Hypertension Sister     Hypertension Brother        Outpatient Encounter Medications as of 2023   Medication Sig Dispense Refill    amLODIPine (NORVASC) 10 MG tablet Take 1 tablet (10 mg total) by mouth once daily. 90 tablet 3    HYDROcodone-acetaminophen (NORCO) 7.5-325 mg per tablet TAKE 1 TABLET BY MOUTH EVERY 6 HOURS AS NEEDED FOR PAIN FOR UP TO 3 DAYS      hydrocortisone (PROCTOCORT) 10 % (80 mg) rectal foam Place 1 applicator rectally 2 (two) times daily as needed (hemorrhoidal flare). 15 g 1    LIDOcaine HCl 2% (XYLOCAINE) 2 % Soln Swish and spit with 15 mL every 6 hours as needed. Do not swallow. 100 mL 0    melatonin (MELATIN) 3 mg tablet Take 2 tablets (6 mg total) by mouth nightly as needed for Insomnia. 30 tablet 0    methocarbamoL (ROBAXIN) 750 MG Tab TAKE 1 TABLET BY MOUTH IN THE MORNING AND 1 TABLET AT NOON AND 1 TABLET IN THE EVENING AND 1 TABLET BEFORE BEDTIME . DO ALL THIS FOR 4 DAYS      mometasone (ELOCON) 0.1 % ointment Apply topically 2 (two) times daily.      naloxone (NARCAN) 4 mg/actuation Spry SMARTSIG:Both Nares   "    nicotine (NICODERM CQ) 21 mg/24 hr 1 patch once daily.      SPS, WITH SORBITOL, 15-20 gram/60 mL Susp Take by mouth.      traMADoL (ULTRAM) 50 mg tablet Take 50 mg by mouth every 6 (six) hours as needed.      traZODone (DESYREL) 100 MG tablet Take 1 tablet (100 mg total) by mouth nightly as needed for Insomnia. 30 tablet 0    [DISCONTINUED] traZODone (DESYREL) 100 MG tablet Take 1 tablet (100 mg total) by mouth nightly as needed for Insomnia. 30 tablet 0     Facility-Administered Encounter Medications as of 9/19/2023   Medication Dose Route Frequency Provider Last Rate Last Admin    lactated ringers infusion   Intravenous Continuous Elzbieta Quesada MD 10 mL/hr at 08/05/22 0808 New Bag at 08/05/22 0808    LIDOcaine (PF) 10 mg/ml (1%) injection 10 mg  1 mL Intradermal Once Yajaira Watts FNP        LIDOcaine (PF) 40 mg/mL (4 %) injection 2 mL  2 mL Other 1 time in Clinic/HOD Tony Pepe MD        phenylephrine HCL 1% nasal spray 1 spray  1 spray Each Nostril 1 time in Clinic/HOD Tony Pepe MD        sodium chloride 0.9% flush 10 mL  10 mL Intravenous PRN Anna Taylor MD        sodium chloride 0.9% flush 10 mL  10 mL Intravenous PRN Jose Johns MD           Physical Exam:  BP (!) 157/93 (BP Location: Right arm, Patient Position: Sitting, BP Method: Large (Automatic))   Pulse 77   Temp 98.1 °F (36.7 °C) (Oral)   Resp 18   Ht 5' 9" (1.753 m)   Wt 75.1 kg (165 lb 9.6 oz)   SpO2 99%   BMI 24.45 kg/m²   Constitutional  General Appearance: well nourished, well-developed, AAO x3, NAD; voice rough  HEENT  Eyes: PEERLA, EOMI, normal conjunctivae  Ears: Hearing well at conversation level;    AD: auricle normal, EAC with nonobstructive cerumen, TM intact without effusion   AS: auricle normal, EAC clear, TM intact without effusion  Nose: septum midline, no inferior turbinate hypertrophy, no polyps, moist mucosa without erythema or blue hue  OC/OP: edentulous,  no oral lesions,  BOT soft, no " leukoplakia/ulcerations/ tenderness, good protrusion of tongue  Nasopharynx, Hypopharynx, and Larynx:    Indirect: attempted, limited view due to patient intolerance  Neck:  Postradiation fibrosis, no discrete lymph nodes appreciated  Neuro: CN II - XII intact bilaterally  Respiratory: non-labored respirations on RA  Psychiatric: oriented to time, place and person, no depression, anxiety or agitation    Flexible Fiberoptic Laryngoscopy/Nasopharyngoscopy via right naris  Anesthesia: None  Adverse Events: None  Indications:  History of head and neck malignancy  Resident Surgeon: Jose Johns MD  Blood loss: none  Condition: good    Procedure in Detail: Informed consent was obtained from the patient after explanation of procedure, indications, risks and benefits. Flexible endoscopy was performed on Yaron Brennan through the right nasal passages. The nasal cavity, nasopharynx, oropharynx, hypopharynx and larynx were adequately visualized. The true vocal cords and arytenoids were examined during phonation and repose.    Findings:  NP/OP: no masses/lesions of NC, eustachian tube, fossa of Rosenmuller, no adenoid hypertrophy  BOT/vallecula:  moderate edema, no signs of ulcerations or lesions, edema of bilateral lateral pharyngeal walls  Piriform sinuses/post-cricoid:  No lesions or masses appreciated  Epiglottis:  Epiglottis thickened consistent with history of radiation, laryngeal surface of right inferior epiglottis with small sessile polyp   Arytenoids/FVFs: no masses/lesions, significant post radiation edema, bilateral movement intact  TVCs: bilateral cord mobility, no masses or lesions  No aspiration, thin secretions; no aspiration noted        Pertinent Data:  CT neck 6/08/23  FINDINGS:  No definite measurable tongue mass on today's exam.  Slightly asymmetric thickening of the posterior tongue on the right greater than left with continued interval improvement.  Right level 2 cervical lymph node measures 7 mm on  today's exam short axis image 52 series 2 compared to 1 cm on the prior.  Residual scattered lymph nodes small by imaging criteria.  Mucosal edema again seen along the region of the inferior pharynx, larynx went.  Superimposed malignancy at the level of the supraglottic trachea, distal hypopharyngeal region not excluded. Thyroid gland normal.  Vascular structures intact.  Mild treatment related subcutaneous adipose tissue edema, thickening anterior and lateral neck bilaterally.  Paraspinal muscles normal. Bones grossly intact.  Visualized intracranial structures normal.  Paranasal sinuses grossly a normal.  Intraorbital contents in normal.  Impression:  Continued improvement in the tongue mass with only slight asymmetric thickening at the posterior tongue on the right.  No measurable mass.  Lymphadenopathy improved with the index node in the right level 2 region smaller in the interval.  No enlarged lymph nodes on today's exam.  Continued submucosal thickening at the pharynx and laryngeal level likely post treatment related changes with superimposed residual malignancy not excluded.      CT soft tissue neck (08/25/2023):    FINDINGS:  There are post treatment changes of the neck with residual mucosal edema.  There is no definite new or progressive suspicious nodularity.  There are no new or enlarging cervical lymph nodes.     The parotid glands, submandibular glands and thyroid gland are unremarkable.  The major vessels in the neck are patent.  There is no acute abnormality of the orbits or visualized brain parenchyma.  There is no destructive bone lesion.  There is trace scattered paranasal sinus mucosal thickening.  There are mild emphysematous changes in the lung apices.     Impression:     Post treatment changes of the neck without evidence of new or progressive metastatic disease.      Assessment/Plan:  Yaron Brennan is a 58 y.o. 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 CRT, finished 10/10/22.  Most recent CT (8/23) without evidence of disease. PET 1/11/23 without disease (in [facility] system), TSH wnl 7/20/23.    Overall doing well.  He does have a sessile polyp to the right inferior laryngeal aspect of his epiglottis that has been present on exam over the past 3 months.      - Discussed biopsy of polyp to rule out recurrence; discussed risks, benefits, alternatives; he is agreeable to proceeding with direct laryngoscopy with biopsy; informed consent obtained; will schedule for 10/02/2023  - RTC 1 week after procedure    Christopher Johns MD  Otolaryngology PGY-III          HEAD & NECK CANCER SURVEILLANCE   Radiation and/or Chemotherapy     Medical Oncologist: Kevon Welsh MD  Radiation Oncologist: Unknown    Primary tumor: cT3 cN2 M0 p16+ base of tongue    Date of Diagnosis: 7/6/22  Therapy: Cisplatin 100 mg/m² IV days 1, 22, and 23, concurrent with RT  Completion Date: 10/10/22    Pertinent Treatment History:   First presentation 7/2022  CT neck 7/5/22 & 7/29/22  Panendoscopy 7/6/22  Right UE DVT 2/2 PICC 9/15/22 on Xarelto  CRT 8/25/22 - 10/10/22      Place date if completed   3 6 12 18 24 36 48 60   CT Neck 1/31/23 6/8/23 X X X X X X   PET/CT 1/11/23          CXR  7/17/23 X X X X X X   TFT 2/2/23 7/20/23 X  X X X X     Current Surveillance Interval:  <1 year; every 4-6 weeks

## 2023-09-19 NOTE — PROGRESS NOTES
[facility]  Otolaryngology Clinic Note    Yaron Brennan  Encounter Date: 9/19/2023  YOB: 1965  Physician: Jose Johns    Chief Complaint: Otalgia, neck mass    HPI: Yaron Brennan is a 58 y.o. male with HTN who presents as referral from Dr. Welsh for squamous cell carcinoma of base of tongue. Patient reports that toward the beginning of the year he started to have worsening left otalgia. He then noticed a bump/swelling on left neck that he thought was was due to a bug bite. This got progressively bigger over time and he then noticed some swelling on right neck as well. He eventually presented to [facility] in early July 2022. He had a CT neck that showed an ulcerated mass in the tongue base, and underwent direct laryngoscopy with ENT on 7/6/22 (Dr. Tay). Operative findings: ulcerated friable mass base of the tongue that seems confined to base of tongue; lingual surface of epiglottis is free from tumor; mass involves bilateral base of tongue, significantly midline. Results returned positive for p16+ SCCa. Today in clinic patient reports bilateral L>R otalgia, mild dysphagia/odynophagia. He reports he eats basically whatever he wants without overt signs of aspiration. He does report 40lb weight loss in the last 6 months. + Neck LAD. Reports voice changes. Denies any issues breathing. Smoked 1ppd since 17 years old (42 years) but quit a month ago when he got his diagnosis. Also previously drank 12 pack of beer a day and likewise quite a month ago. Referred to ENT for consideration of possible surgical resection.     07/05/2022: CT soft tissue of the neck without contrast:  -ulcerated tongue base mass, at least 3.4 cm by 3.0 cm x 3.1 cm; associated bulky level 2 and level 3 cervical lymphadenopathy; suspected level 1 and level 4 cervical lymphadenopathy; a right level 2 lymph node with central necrosis 2.6 x 3.3 cm; a left level 3 lymph node 4.5 x 3.2 cm; no  significant airway compromise; no acute or aggressive bony lesions  -malignant ulcerated type mass with lymph node metastasis; no airway compromise; right upper lung lobe indistinct ground-glass pulmonary nodules typical of benign infectious or inflammatory etiology    8/16/22: Here today for follow up. Had dental extractions performed on 8/5. Not having any issues. Taking in 4-5 ensures daily. Trying to take some other soft foods like grits and pudding.     9/22/22: Here for follow up. Patient is undergoing CRT and tolerating it well. He reports he has one more chemotherapy session left and about 2-3 weeks of radiation left. Overall maintaining his weight. No progressive dysphagia, swallowing fine. Also no breathing issues or other complaints.    11/3/22: Patient is here today for follow up.  He finished CRT on 10/10.  Patient states he is still in some pain in  his neck and inside of his mouth.   He is tolerating p.o. intake but states at times he does have difficulty swallowing.  He does have xerostomia.  Patient has not used his eardrops to loosen the cerumen impaction.  Denies any shortness of breath or difficulty breathing/ changes to voice,  changes to his neck masses.    12/7/22:  Patient returns for surveillance today.  Patient denies any new dysphagia, odynophagia, H&N lumps or bumps, otalgia, weight loss.  Overall pain is getting better, tolerating diet, gaining weight.    03/14/2023 doing well, no pain, no otalgia, dysphagia has improved significantly, weight is stable.  No hemoptysis.  Voice remains hoarse.  No airway distress.    4/11/2023: Returns today for follow-up. Reports doing well overall. No dysphagia, swallowing all foods without issue. Voice stable. No odynophagia, no SOB, no otalgia. Does note feeling like ears are clogged, has tried ear drops without success. Did get TSH since last visit, wnl.    5/18/23: Patient here today for follow up. Doing well since last visit. No major concerns.  Denies weight loss, new lumps/bumps, sore throat, dysphagia, odynophagia, or otalgia. Having some pain when chewing. Does not have dentures.     6/13/23: Returns today for follow-up/surveillance. Patient reports doing well overall. No issues with dysphagia, odynophagia, voice changes, otalgia, or weight loss. Still looking for dentist to get dentures made. Did get CT neck since last visit, but CXR and TSH still pending.     7/19/23: Here for surveillance. He is doing well. He is swallowing food of all consistencies without issue - no coughing or choking. He denies voice changes, weight loss, pain, or new lumps/bumps. He is breathing comfortably and active. Having trouble with sleep at night.    8/16/23: He has been doing very well since his last visit no issues at all.  He has eating a regular diet and no issues swallowing.  No voice changes lumps or bumps in her neck.    9/19/23:  Here today for follow-up after CT.  No issues in the interim.  Dysphagia and odynophagia are stable: Able to eat most things except for tough meat.  Weight has been stable.  No noted lumps/bumps.    Review of patient's allergies indicates:  No Known Allergies    Past Medical History:   Diagnosis Date    Cancer     Hypertension        Past Surgical History:   Procedure Laterality Date    BACK SURGERY      DIRECT LARYNGOSCOPY N/A 08/05/2022    Procedure: LARYNGOSCOPY, DIRECT;  Surgeon: Mekhi Grossman MD;  Location: [facility];  Service: ENT;  Laterality: N/A;    EXTRACTION OF TOOTH N/A 08/05/2022    Procedure: EXTRACTION, TEETH;  Surgeon: Mekhi Grossman MD;  Location: [facility];  Service: ENT;  Laterality: N/A;    SPINE SURGERY  2000    Not sure of date maybe in 2000       Social History     Socioeconomic History    Marital status:      Spouse name: Blanca   Occupational History    Occupation: Disabled   Tobacco Use    Smoking status: Former     Current packs/day: 0.00     Types: Cigarettes     Start date: 1982     Quit date:  2022     Years since quittin.2    Smokeless tobacco: Never    Tobacco comments:     Been almost 35 yr- Using Nicotine patches- denies smoking at current time   Substance and Sexual Activity    Alcohol use: Not Currently    Drug use: Yes     Types: Marijuana     Comment: Daily    Sexual activity: Yes     Partners: Female     Birth control/protection: None     Social Determinants of Health     Transportation Needs: No Transportation Needs (2022)    PRAPARE - Transportation     Lack of Transportation (Medical): No     Lack of Transportation (Non-Medical): No   Housing Stability: High Risk (2022)    Housing Stability Vital Sign     Unable to Pay for Housing in the Last Year: Yes     Number of Places Lived in the Last Year: 2     Unstable Housing in the Last Year: No       Family History   Problem Relation Age of Onset    Hypertension Mother     Cancer Father     Hypertension Sister     Hypertension Brother        Outpatient Encounter Medications as of 2023   Medication Sig Dispense Refill    amLODIPine (NORVASC) 10 MG tablet Take 1 tablet (10 mg total) by mouth once daily. 90 tablet 3    HYDROcodone-acetaminophen (NORCO) 7.5-325 mg per tablet TAKE 1 TABLET BY MOUTH EVERY 6 HOURS AS NEEDED FOR PAIN FOR UP TO 3 DAYS      hydrocortisone (PROCTOCORT) 10 % (80 mg) rectal foam Place 1 applicator rectally 2 (two) times daily as needed (hemorrhoidal flare). 15 g 1    LIDOcaine HCl 2% (XYLOCAINE) 2 % Soln Swish and spit with 15 mL every 6 hours as needed. Do not swallow. 100 mL 0    melatonin (MELATIN) 3 mg tablet Take 2 tablets (6 mg total) by mouth nightly as needed for Insomnia. 30 tablet 0    methocarbamoL (ROBAXIN) 750 MG Tab TAKE 1 TABLET BY MOUTH IN THE MORNING AND 1 TABLET AT NOON AND 1 TABLET IN THE EVENING AND 1 TABLET BEFORE BEDTIME . DO ALL THIS FOR 4 DAYS      mometasone (ELOCON) 0.1 % ointment Apply topically 2 (two) times daily.      naloxone (NARCAN) 4 mg/actuation Spry SMARTSIG:Both Nares   "    nicotine (NICODERM CQ) 21 mg/24 hr 1 patch once daily.      SPS, WITH SORBITOL, 15-20 gram/60 mL Susp Take by mouth.      traMADoL (ULTRAM) 50 mg tablet Take 50 mg by mouth every 6 (six) hours as needed.      traZODone (DESYREL) 100 MG tablet Take 1 tablet (100 mg total) by mouth nightly as needed for Insomnia. 30 tablet 0    [DISCONTINUED] traZODone (DESYREL) 100 MG tablet Take 1 tablet (100 mg total) by mouth nightly as needed for Insomnia. 30 tablet 0     Facility-Administered Encounter Medications as of 9/19/2023   Medication Dose Route Frequency Provider Last Rate Last Admin    lactated ringers infusion   Intravenous Continuous Elzbieta Quesada MD 10 mL/hr at 08/05/22 0808 New Bag at 08/05/22 0808    LIDOcaine (PF) 10 mg/ml (1%) injection 10 mg  1 mL Intradermal Once Yajaira Watts FNP        LIDOcaine (PF) 40 mg/mL (4 %) injection 2 mL  2 mL Other 1 time in Clinic/HOD Tony Pepe MD        phenylephrine HCL 1% nasal spray 1 spray  1 spray Each Nostril 1 time in Clinic/HOD Tony Pepe MD        sodium chloride 0.9% flush 10 mL  10 mL Intravenous PRN Anna Taylor MD        sodium chloride 0.9% flush 10 mL  10 mL Intravenous PRN Jose Johns MD           Physical Exam:  BP (!) 157/93 (BP Location: Right arm, Patient Position: Sitting, BP Method: Large (Automatic))   Pulse 77   Temp 98.1 °F (36.7 °C) (Oral)   Resp 18   Ht 5' 9" (1.753 m)   Wt 75.1 kg (165 lb 9.6 oz)   SpO2 99%   BMI 24.45 kg/m²   Constitutional  General Appearance: well nourished, well-developed, AAO x3, NAD; voice rough  HEENT  Eyes: PEERLA, EOMI, normal conjunctivae  Ears: Hearing well at conversation level;    AD: auricle normal, EAC with nonobstructive cerumen, TM intact without effusion   AS: auricle normal, EAC clear, TM intact without effusion  Nose: septum midline, no inferior turbinate hypertrophy, no polyps, moist mucosa without erythema or blue hue  OC/OP: edentulous,  no oral lesions,  BOT soft, no " leukoplakia/ulcerations/ tenderness, good protrusion of tongue  Nasopharynx, Hypopharynx, and Larynx:    Indirect: attempted, limited view due to patient intolerance  Neck:  Postradiation fibrosis, no discrete lymph nodes appreciated  Neuro: CN II - XII intact bilaterally  Respiratory: non-labored respirations on RA  Psychiatric: oriented to time, place and person, no depression, anxiety or agitation    Flexible Fiberoptic Laryngoscopy/Nasopharyngoscopy via right naris  Anesthesia: None  Adverse Events: None  Indications:  History of head and neck malignancy  Resident Surgeon: Jose Johns MD  Blood loss: none  Condition: good    Procedure in Detail: Informed consent was obtained from the patient after explanation of procedure, indications, risks and benefits. Flexible endoscopy was performed on Yaron Brennan through the right nasal passages. The nasal cavity, nasopharynx, oropharynx, hypopharynx and larynx were adequately visualized. The true vocal cords and arytenoids were examined during phonation and repose.    Findings:  NP/OP: no masses/lesions of NC, eustachian tube, fossa of Rosenmuller, no adenoid hypertrophy  BOT/vallecula:  moderate edema, no signs of ulcerations or lesions, edema of bilateral lateral pharyngeal walls  Piriform sinuses/post-cricoid:  No lesions or masses appreciated  Epiglottis:  Epiglottis thickened consistent with history of radiation, laryngeal surface of right inferior epiglottis with small sessile polyp   Arytenoids/FVFs: no masses/lesions, significant post radiation edema, bilateral movement intact  TVCs: bilateral cord mobility, no masses or lesions  No aspiration, thin secretions; no aspiration noted        Pertinent Data:  CT neck 6/08/23  FINDINGS:  No definite measurable tongue mass on today's exam.  Slightly asymmetric thickening of the posterior tongue on the right greater than left with continued interval improvement.  Right level 2 cervical lymph node measures 7 mm on  today's exam short axis image 52 series 2 compared to 1 cm on the prior.  Residual scattered lymph nodes small by imaging criteria.  Mucosal edema again seen along the region of the inferior pharynx, larynx went.  Superimposed malignancy at the level of the supraglottic trachea, distal hypopharyngeal region not excluded. Thyroid gland normal.  Vascular structures intact.  Mild treatment related subcutaneous adipose tissue edema, thickening anterior and lateral neck bilaterally.  Paraspinal muscles normal. Bones grossly intact.  Visualized intracranial structures normal.  Paranasal sinuses grossly a normal.  Intraorbital contents in normal.  Impression:  Continued improvement in the tongue mass with only slight asymmetric thickening at the posterior tongue on the right.  No measurable mass.  Lymphadenopathy improved with the index node in the right level 2 region smaller in the interval.  No enlarged lymph nodes on today's exam.  Continued submucosal thickening at the pharynx and laryngeal level likely post treatment related changes with superimposed residual malignancy not excluded.      CT soft tissue neck (08/25/2023):    FINDINGS:  There are post treatment changes of the neck with residual mucosal edema.  There is no definite new or progressive suspicious nodularity.  There are no new or enlarging cervical lymph nodes.     The parotid glands, submandibular glands and thyroid gland are unremarkable.  The major vessels in the neck are patent.  There is no acute abnormality of the orbits or visualized brain parenchyma.  There is no destructive bone lesion.  There is trace scattered paranasal sinus mucosal thickening.  There are mild emphysematous changes in the lung apices.     Impression:     Post treatment changes of the neck without evidence of new or progressive metastatic disease.      Assessment/Plan:  Yaron Brennan is a 58 y.o. 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 CRT, finished 10/10/22.  Most recent CT (8/23) without evidence of disease. PET 1/11/23 without disease (in [facility] system), TSH wnl 7/20/23.    Overall doing well.  He does have a sessile polyp to the right inferior laryngeal aspect of his epiglottis that has been present on exam over the past 3 months.      - Discussed biopsy of polyp to rule out recurrence; discussed risks, benefits, alternatives; he is agreeable to proceeding with direct laryngoscopy with biopsy; informed consent obtained; will schedule for 10/02/2023  - RTC 1 week after procedure    Christopher Johns MD  Otolaryngology PGY-III          HEAD & NECK CANCER SURVEILLANCE   Radiation and/or Chemotherapy     Medical Oncologist: Kevon Welsh MD  Radiation Oncologist: Unknown    Primary tumor: cT3 cN2 M0 p16+ base of tongue    Date of Diagnosis: 7/6/22  Therapy: Cisplatin 100 mg/m² IV days 1, 22, and 23, concurrent with RT  Completion Date: 10/10/22    Pertinent Treatment History:   First presentation 7/2022  CT neck 7/5/22 & 7/29/22  Panendoscopy 7/6/22  Right UE DVT 2/2 PICC 9/15/22 on Xarelto  CRT 8/25/22 - 10/10/22      Place date if completed   3 6 12 18 24 36 48 60   CT Neck 1/31/23 6/8/23 X X X X X X   PET/CT 1/11/23          CXR  7/17/23 X X X X X X   TFT 2/2/23 7/20/23 X  X X X X     Current Surveillance Interval:  <1 year; every 4-6 weeks

## 2023-09-21 ENCOUNTER — ANESTHESIA EVENT (OUTPATIENT)
Dept: SURGERY | Facility: HOSPITAL | Age: 58
End: 2023-09-21
Payer: MEDICAID

## 2023-09-21 DIAGNOSIS — G47.09 OTHER INSOMNIA: ICD-10-CM

## 2023-09-21 RX ORDER — TRAZODONE HYDROCHLORIDE 100 MG/1
100 TABLET ORAL NIGHTLY PRN
Qty: 30 TABLET | Refills: 0 | Status: SHIPPED | OUTPATIENT
Start: 2023-09-21 | End: 2023-10-27 | Stop reason: SDUPTHER

## 2023-09-21 NOTE — ANESTHESIA PREPROCEDURE EVALUATION
09/21/2023  Yaron Brennan is a 58 y.o., male.    COVID STATUS: NOT VACCINATED  BETA-BLOCKER: NONE          Vitals:    10/02/23 0544 10/02/23 0546 10/02/23 0748   BP: (!) 157/90  138/89   Pulse: 74  67   Resp:   18   Temp: 37 °C (98.6 °F)  36.5 °C (97.7 °F)   TempSrc: Oral  Temporal   SpO2: 100%  98%   Weight:  72.6 kg (160 lb)        There were no vitals filed for this visit.       PROBLEM LIST:  -  RIGHT INFERIOR EPIGLOTTIS SESSILE POLYP; Hx BASE of TONGUE SCC w/BILATERAL NECK METASTASIS (Dx 7/6/22) (CHEMO 8/25/22-10/10/22, XRT 8/24-10/11/22)      - HOSP. 7/5-7/7/22 @ [facility] w/progressive dysphagia, bilateral bulky cervical lymphadenopathy.7/6/22 DL, TONGUE, BASE, BIOPSY: INVASIVE SQUAMOUS CELL CARCINOMA, GRADE 3 OF 4. P16 IS POSITIVE      - S/P 8/5/22 DL, DENTAL EXTRACTIONS  -  HTN  -  40# WEIGHT LOSS in 6 mos.  -  Hx RUE SUBCLAVIAN, AXILLARY & BASILIC VEIN DVT (Dx 9/15/22 - 2/2 RIGHT UE PICC, SINCE REMOVED) - on XARELTO (9/15-12/15/22)  -  Hx 20-30 yrs. AGO L-SPINE FUSION  -  CERVICAL DISC DISEASE w/LUE RADICULOPATHY          - 6/19/23 CT C-SPINE = Degenerative changes: Canal detail is limited caudal to the C6 level due to quantum mottle. Congenital narrowing of the canal. Mild bilateral foraminal narrowing at C3-4 related to uncovertebral hypertrophy.  -  PAST SMOKER - QUIT 8/2023, 40+PPY      - A small area of faint patchy ground-glass is present in the posterior right upper lobe on CT  -  MARIJUANA USE  -  ETOH (LASTLY 8/2023); Hx ETOH ABUSE - 12 BEERS/DAY X 40yrs.       Lab Results   Component Value Date    WBC 4.59 07/20/2023    HGB 12.3 (L) 07/20/2023    HCT 36.7 (L) 07/20/2023     07/20/2023    CHOL 173 11/22/2022    TRIG 52 11/22/2022    HDL 60 11/22/2022    ALT 8 07/20/2023    AST 13 07/20/2023     07/20/2023    K 3.8 07/20/2023    CREATININE 1.14 08/25/2023    BUN 12.3 07/20/2023     CO2 25 07/20/2023    TSH 1.589 07/20/2023    PSA 1.29 11/22/2022    HGBA1C 4.8 11/22/2022     AM Rx DOS: AMLODIPINE, NORCO or TRAMADOL PRN     ORDERS -   SURGEON:  7/29/22 CT THORAX; 7/20/23 CBC, CMP; 8/25/23 RENAL FUNC;  ANESTHESIA: NONE  9/19/23 Flexible Fiberoptic Laryngoscopy/Nasopharyngoscopy via right naris  Anesthesia: None  Adverse Events: None  Indications:  History of head and neck malignancy  Resident Surgeon: Jose Johns MD  Blood loss: none  Condition: good     Procedure in Detail: Informed consent was obtained from the patient after explanation of procedure, indications, risks and benefits. Flexible endoscopy was performed on Yaron Brennan through the right nasal passages. The nasal cavity, nasopharynx, oropharynx, hypopharynx and larynx were adequately visualized. The true vocal cords and arytenoids were examined during phonation and repose.     Findings:  NP/OP: no masses/lesions of NC, eustachian tube, fossa of Rosenmuller, no adenoid hypertrophy  BOT/vallecula:  moderate edema, no signs of ulcerations or lesions, edema of bilateral lateral pharyngeal walls  Piriform sinuses/post-cricoid:  No lesions or masses appreciated  Epiglottis:  Epiglottis thickened consistent with history of radiation, laryngeal surface of right inferior epiglottis with small sessile polyp   Arytenoids/FVFs: no masses/lesions, significant post radiation edema, bilateral movement intact  TVCs: bilateral cord mobility, no masses or lesions  No aspiration, thin secretions; no aspiration noted     Pre-op Assessment    I have reviewed the Patient Summary Reports.     I have reviewed the Nursing Notes. I have reviewed the NPO Status.   I have reviewed the Medications.     Review of Systems  Anesthesia Hx:  No problems with previous Anesthesia  History of prior surgery of interest to airway management or planning: Denies Family Hx of Anesthesia complications.   Denies Personal Hx of Anesthesia complications.    Hematology/Oncology:  Hematology Normal   Oncology Normal     EENT/Dental:EENT/Dental Normal   Cardiovascular:  Cardiovascular Normal     Pulmonary:  Pulmonary Normal    Renal/:  Renal/ Normal     Hepatic/GI:  Hepatic/GI Normal    Musculoskeletal:  Musculoskeletal Normal    Neurological:  Neurology Normal    Endocrine:  Endocrine Normal    Dermatological:  Skin Normal    Psych:  Psychiatric Normal           Physical Exam  General: Cooperative, Well nourished, Alert and Oriented    Airway:  Mallampati: / III  Mouth Opening: Normal  TM Distance: Normal  Tongue: Normal  Neck ROM: Normal ROM    Dental:  Dentures        Anesthesia Plan  Type of Anesthesia, risks & benefits discussed:    Anesthesia Type: Gen ETT  Intra-op Monitoring Plan: Standard ASA Monitors  Post Op Pain Control Plan: multimodal analgesia and IV/PO Opioids PRN  Induction:  IV  Airway Plan: Direct  Informed Consent: Informed consent signed with the Patient and all parties understand the risks and agree with anesthesia plan.  All questions answered. Patient consented to blood products? No  ASA Score: 4  Day of Surgery Review of History & Physical: H&P Update referred to the surgeon/provider.    Ready For Surgery From Anesthesia Perspective.     .    Lab Results   Component Value Date    WBC 4.59 07/20/2023    HGB 12.3 (L) 07/20/2023    HCT 36.7 (L) 07/20/2023    MCV 93.6 07/20/2023     07/20/2023     CMP  Sodium Level   Date Value Ref Range Status   07/20/2023 142 136 - 145 mmol/L Final     Potassium Level   Date Value Ref Range Status   07/20/2023 3.8 3.5 - 5.1 mmol/L Final     Carbon Dioxide   Date Value Ref Range Status   07/20/2023 25 22 - 29 mmol/L Final     Blood Urea Nitrogen   Date Value Ref Range Status   07/20/2023 12.3 8.4 - 25.7 mg/dL Final     Creatinine   Date Value Ref Range Status   08/25/2023 1.14 0.73 - 1.18 mg/dL Final     Calcium Level Total   Date Value Ref Range Status   07/20/2023 9.4 8.4 - 10.2 mg/dL Final     Albumin  Level   Date Value Ref Range Status   07/20/2023 4.1 3.5 - 5.0 g/dL Final     Bilirubin Total   Date Value Ref Range Status   07/20/2023 0.5 <=1.5 mg/dL Final     Alkaline Phosphatase   Date Value Ref Range Status   07/20/2023 66 40 - 150 unit/L Final     Aspartate Aminotransferase   Date Value Ref Range Status   07/20/2023 13 5 - 34 unit/L Final     Alanine Aminotransferase   Date Value Ref Range Status   07/20/2023 8 0 - 55 unit/L Final     eGFR   Date Value Ref Range Status   08/25/2023 >60 mls/min/1.73/m2 Final     7/29/22 CT CHEST  FINDINGS:  Support devices: None     Heart/pericardial/pleural spaces: Cardiac size is nonenlarged.  There is no coronary calcium.  No pleural or pericardial effusion is observed.  The right lung major fissure is thickened superiorly.     Hilum/mediastinum/great vessels: No hilar or mediastinal lymphadenopathy is detected.  The aortic arch and pulmonary trunk are normal in caliber.     Chest wall/diaphragm/upper abdomen: No axillary or supraclavicular lymphadenopathy is identified.  The included upper abdomen is unremarkable.  No bone lesion is detected.     Lungs: A small area of faint patchy ground-glass is present in the posterior right upper lobe (example axial lung reformats image 23), nonspecific.  There is no pulmonary nodule.     Central airway: The tracheobronchial tree is patent.     Impression:     No evidence of metastatic disease in the thorax.     Small faint focus pulmonary ground-glass in the posterior right upper lobe.     Mild thickening of the right lung major fissure superiorly.         8/5/22 ANESTHESIA

## 2023-09-29 ENCOUNTER — PATIENT MESSAGE (OUTPATIENT)
Dept: ADMINISTRATIVE | Facility: OTHER | Age: 58
End: 2023-09-29
Payer: MEDICAID

## 2023-09-29 ENCOUNTER — PATIENT MESSAGE (OUTPATIENT)
Dept: SURGERY | Facility: HOSPITAL | Age: 58
End: 2023-09-29
Payer: MEDICAID

## 2023-09-30 ENCOUNTER — PATIENT MESSAGE (OUTPATIENT)
Dept: ADMINISTRATIVE | Facility: OTHER | Age: 58
End: 2023-09-30
Payer: MEDICAID

## 2023-10-01 ENCOUNTER — PATIENT MESSAGE (OUTPATIENT)
Dept: ADMINISTRATIVE | Facility: OTHER | Age: 58
End: 2023-10-01
Payer: MEDICAID

## 2023-10-02 ENCOUNTER — HOSPITAL ENCOUNTER (OUTPATIENT)
Facility: HOSPITAL | Age: 58
Discharge: HOME | End: 2023-10-02
Attending: OTOLARYNGOLOGY | Admitting: OTOLARYNGOLOGY
Payer: MEDICAID

## 2023-10-02 ENCOUNTER — ANESTHESIA (OUTPATIENT)
Dept: SURGERY | Facility: HOSPITAL | Age: 58
End: 2023-10-02
Payer: MEDICAID

## 2023-10-02 DIAGNOSIS — J38.7 LARYNGEAL MASS: ICD-10-CM

## 2023-10-02 DIAGNOSIS — C76.0 HEAD AND NECK MALIGNANCY: ICD-10-CM

## 2023-10-02 DIAGNOSIS — C01 SQUAMOUS CELL CARCINOMA OF BASE OF TONGUE: Primary | ICD-10-CM

## 2023-10-02 PROCEDURE — D9220A PRA ANESTHESIA: Mod: CRNA,,, | Performed by: NURSE ANESTHETIST, CERTIFIED REGISTERED

## 2023-10-02 PROCEDURE — 25000003 PHARM REV CODE 250: Performed by: OTOLARYNGOLOGY

## 2023-10-02 PROCEDURE — 88305 TISSUE EXAM BY PATHOLOGIST: CPT | Mod: TC | Performed by: OTOLARYNGOLOGY

## 2023-10-02 PROCEDURE — 63600175 PHARM REV CODE 636 W HCPCS: Performed by: SPECIALIST

## 2023-10-02 PROCEDURE — 37000008 HC ANESTHESIA 1ST 15 MINUTES: Performed by: OTOLARYNGOLOGY

## 2023-10-02 PROCEDURE — 25000003 PHARM REV CODE 250: Performed by: NURSE ANESTHETIST, CERTIFIED REGISTERED

## 2023-10-02 PROCEDURE — D9220A PRA ANESTHESIA: ICD-10-PCS | Mod: CRNA,,, | Performed by: NURSE ANESTHETIST, CERTIFIED REGISTERED

## 2023-10-02 PROCEDURE — D9220A PRA ANESTHESIA: Mod: ANES,,, | Performed by: SPECIALIST

## 2023-10-02 PROCEDURE — 37000009 HC ANESTHESIA EA ADD 15 MINS: Performed by: OTOLARYNGOLOGY

## 2023-10-02 PROCEDURE — 71000015 HC POSTOP RECOV 1ST HR: Performed by: OTOLARYNGOLOGY

## 2023-10-02 PROCEDURE — 36000709 HC OR TIME LEV III EA ADD 15 MIN: Performed by: OTOLARYNGOLOGY

## 2023-10-02 PROCEDURE — 71000033 HC RECOVERY, INTIAL HOUR: Performed by: OTOLARYNGOLOGY

## 2023-10-02 PROCEDURE — 63600175 PHARM REV CODE 636 W HCPCS: Performed by: NURSE ANESTHETIST, CERTIFIED REGISTERED

## 2023-10-02 PROCEDURE — D9220A PRA ANESTHESIA: ICD-10-PCS | Mod: ANES,,, | Performed by: SPECIALIST

## 2023-10-02 PROCEDURE — 36000708 HC OR TIME LEV III 1ST 15 MIN: Performed by: OTOLARYNGOLOGY

## 2023-10-02 PROCEDURE — 25000003 PHARM REV CODE 250: Performed by: SPECIALIST

## 2023-10-02 RX ORDER — IPRATROPIUM BROMIDE AND ALBUTEROL SULFATE 2.5; .5 MG/3ML; MG/3ML
3 SOLUTION RESPIRATORY (INHALATION) ONCE AS NEEDED
Status: DISCONTINUED | OUTPATIENT
Start: 2023-10-02 | End: 2023-10-02 | Stop reason: HOSPADM

## 2023-10-02 RX ORDER — LIDOCAINE HYDROCHLORIDE 10 MG/ML
1 INJECTION, SOLUTION EPIDURAL; INFILTRATION; INTRACAUDAL; PERINEURAL ONCE
Status: DISCONTINUED | OUTPATIENT
Start: 2023-10-02 | End: 2023-10-02 | Stop reason: HOSPADM

## 2023-10-02 RX ORDER — ROCURONIUM BROMIDE 10 MG/ML
INJECTION, SOLUTION INTRAVENOUS
Status: DISCONTINUED | OUTPATIENT
Start: 2023-10-02 | End: 2023-10-02

## 2023-10-02 RX ORDER — HYDROMORPHONE HYDROCHLORIDE 1 MG/ML
0.2 INJECTION, SOLUTION INTRAMUSCULAR; INTRAVENOUS; SUBCUTANEOUS EVERY 5 MIN PRN
Status: DISCONTINUED | OUTPATIENT
Start: 2023-10-02 | End: 2023-10-02 | Stop reason: HOSPADM

## 2023-10-02 RX ORDER — OXYMETAZOLINE HCL 0.05 %
SPRAY, NON-AEROSOL (ML) NASAL
Status: DISCONTINUED | OUTPATIENT
Start: 2023-10-02 | End: 2023-10-02 | Stop reason: HOSPADM

## 2023-10-02 RX ORDER — DEXMEDETOMIDINE HYDROCHLORIDE 100 UG/ML
INJECTION, SOLUTION INTRAVENOUS
Status: DISCONTINUED | OUTPATIENT
Start: 2023-10-02 | End: 2023-10-02

## 2023-10-02 RX ORDER — SODIUM CHLORIDE 9 MG/ML
INJECTION, SOLUTION INTRAVENOUS CONTINUOUS
Status: DISCONTINUED | OUTPATIENT
Start: 2023-10-02 | End: 2023-10-02 | Stop reason: HOSPADM

## 2023-10-02 RX ORDER — FENTANYL CITRATE 50 UG/ML
INJECTION, SOLUTION INTRAMUSCULAR; INTRAVENOUS
Status: DISCONTINUED | OUTPATIENT
Start: 2023-10-02 | End: 2023-10-02

## 2023-10-02 RX ORDER — PROCHLORPERAZINE EDISYLATE 5 MG/ML
5 INJECTION INTRAMUSCULAR; INTRAVENOUS ONCE AS NEEDED
Status: DISCONTINUED | OUTPATIENT
Start: 2023-10-02 | End: 2023-10-02 | Stop reason: HOSPADM

## 2023-10-02 RX ORDER — MEPERIDINE HYDROCHLORIDE 25 MG/ML
12.5 INJECTION INTRAMUSCULAR; INTRAVENOUS; SUBCUTANEOUS ONCE
Status: DISCONTINUED | OUTPATIENT
Start: 2023-10-02 | End: 2023-10-02 | Stop reason: HOSPADM

## 2023-10-02 RX ORDER — ONDANSETRON 2 MG/ML
INJECTION INTRAMUSCULAR; INTRAVENOUS
Status: DISCONTINUED | OUTPATIENT
Start: 2023-10-02 | End: 2023-10-02

## 2023-10-02 RX ORDER — DIPHENHYDRAMINE HYDROCHLORIDE 50 MG/ML
25 INJECTION INTRAMUSCULAR; INTRAVENOUS ONCE AS NEEDED
Status: DISCONTINUED | OUTPATIENT
Start: 2023-10-02 | End: 2023-10-02 | Stop reason: HOSPADM

## 2023-10-02 RX ORDER — MIDAZOLAM HYDROCHLORIDE 1 MG/ML
2 INJECTION INTRAMUSCULAR; INTRAVENOUS ONCE
Status: COMPLETED | OUTPATIENT
Start: 2023-10-02 | End: 2023-10-02

## 2023-10-02 RX ORDER — LIDOCAINE HYDROCHLORIDE 20 MG/ML
INJECTION INTRAVENOUS
Status: DISCONTINUED | OUTPATIENT
Start: 2023-10-02 | End: 2023-10-02

## 2023-10-02 RX ORDER — DEXAMETHASONE SODIUM PHOSPHATE 4 MG/ML
INJECTION, SOLUTION INTRA-ARTICULAR; INTRALESIONAL; INTRAMUSCULAR; INTRAVENOUS; SOFT TISSUE
Status: DISCONTINUED | OUTPATIENT
Start: 2023-10-02 | End: 2023-10-02

## 2023-10-02 RX ORDER — PROPOFOL 10 MG/ML
VIAL (ML) INTRAVENOUS
Status: DISCONTINUED | OUTPATIENT
Start: 2023-10-02 | End: 2023-10-02

## 2023-10-02 RX ORDER — ONDANSETRON 2 MG/ML
4 INJECTION INTRAMUSCULAR; INTRAVENOUS ONCE
Status: DISCONTINUED | OUTPATIENT
Start: 2023-10-02 | End: 2023-10-02 | Stop reason: HOSPADM

## 2023-10-02 RX ORDER — HYDROMORPHONE HYDROCHLORIDE 1 MG/ML
0.5 INJECTION, SOLUTION INTRAMUSCULAR; INTRAVENOUS; SUBCUTANEOUS EVERY 5 MIN PRN
Status: DISCONTINUED | OUTPATIENT
Start: 2023-10-02 | End: 2023-10-02 | Stop reason: HOSPADM

## 2023-10-02 RX ORDER — OXYCODONE AND ACETAMINOPHEN 5; 325 MG/1; MG/1
2 TABLET ORAL ONCE
Status: DISCONTINUED | OUTPATIENT
Start: 2023-10-02 | End: 2023-10-02 | Stop reason: HOSPADM

## 2023-10-02 RX ORDER — LIDOCAINE HYDROCHLORIDE 10 MG/ML
1 INJECTION, SOLUTION EPIDURAL; INFILTRATION; INTRACAUDAL; PERINEURAL ONCE
Status: ACTIVE | OUTPATIENT
Start: 2023-10-02

## 2023-10-02 RX ADMIN — DEXMEDETOMIDINE 4 MCG: 200 INJECTION, SOLUTION INTRAVENOUS at 08:10

## 2023-10-02 RX ADMIN — SODIUM CHLORIDE: 9 INJECTION, SOLUTION INTRAVENOUS at 08:10

## 2023-10-02 RX ADMIN — SODIUM CHLORIDE: 9 INJECTION, SOLUTION INTRAVENOUS at 07:10

## 2023-10-02 RX ADMIN — SUGAMMADEX 200 MG: 100 INJECTION, SOLUTION INTRAVENOUS at 08:10

## 2023-10-02 RX ADMIN — ONDANSETRON 4 MG: 2 INJECTION INTRAMUSCULAR; INTRAVENOUS at 08:10

## 2023-10-02 RX ADMIN — MIDAZOLAM HYDROCHLORIDE 2 MG: 1 INJECTION, SOLUTION INTRAMUSCULAR; INTRAVENOUS at 08:10

## 2023-10-02 RX ADMIN — PROPOFOL 170 MG: 10 INJECTION, EMULSION INTRAVENOUS at 08:10

## 2023-10-02 RX ADMIN — FENTANYL CITRATE 100 MCG: 50 INJECTION INTRAMUSCULAR; INTRAVENOUS at 08:10

## 2023-10-02 RX ADMIN — DEXAMETHASONE SODIUM PHOSPHATE 8 MG: 4 INJECTION, SOLUTION INTRA-ARTICULAR; INTRALESIONAL; INTRAMUSCULAR; INTRAVENOUS; SOFT TISSUE at 08:10

## 2023-10-02 RX ADMIN — ROCURONIUM BROMIDE 50 MG: 10 INJECTION INTRAVENOUS at 08:10

## 2023-10-02 RX ADMIN — LIDOCAINE HYDROCHLORIDE 80 MG: 20 INJECTION INTRAVENOUS at 08:10

## 2023-10-02 NOTE — DISCHARGE INSTRUCTIONS
Take tylenol and ibuprofen as needed for pain.   You will likely have a mild sore throat for a few days.

## 2023-10-02 NOTE — BRIEF OP NOTE
[facility] - Periop Services  Brief Operative Note    Surgery Date: 10/2/2023     Surgeon(s) and Role:     * Mekhi Grossman MD - Primary     * Caitlyn Gordillo MD - Resident - Assisting        Pre-op Diagnosis:  * No pre-op diagnosis entered *    Post-op Diagnosis:  Post-Op Diagnosis Codes:     * Head and neck malignancy [C76.0]    Procedure:  Direct laryngoscopy with biopsy    Anesthesia: General    Operative Findings: see op note    Estimated Blood Loss: * No values recorded between 10/2/2023  8:39 AM and 10/2/2023  8:50 AM *         Specimens:   Specimen (24h ago, onward)       Start     Ordered    10/02/23 0844  Specimen to Pathology  RELEASE UPON ORDERING        References:    Click here for ordering Quick Tip   Question:  Release to patient  Answer:  Immediate    10/02/23 0844                      Discharge Note    OUTCOME: Patient tolerated treatment/procedure well without complication and is now ready for discharge.    DISPOSITION: Home or Self Care    FINAL DIAGNOSIS:  <principal problem not specified>    FOLLOWUP: In clinic    DISCHARGE INSTRUCTIONS:    Discharge Procedure Orders   Diet Adult Regular     Activity as tolerated

## 2023-10-02 NOTE — ANESTHESIA POSTPROCEDURE EVALUATION
Anesthesia Post Evaluation    Patient: Yaron Brennan    Procedure(s) Performed: Procedure(s) (LRB):  LARYNGOSCOPY, DIRECT (N/A)    Final Anesthesia Type: general      Patient location during evaluation: PACU  Patient participation: Yes- Able to Participate  Level of consciousness: awake and responds to stimulation  Post-procedure vital signs: reviewed and stable  Pain management: adequate  Airway patency: patent    PONV status at discharge: No PONV  Anesthetic complications: no      Cardiovascular status: blood pressure returned to baseline  Respiratory status: unassisted  Hydration status: euvolemic  Follow-up not needed.          Vitals Value Taken Time   /92 10/02/23 0913   Temp 36.6 °C (97.8 °F) 10/02/23 0856   Pulse 73 10/02/23 0913   Resp 17 10/02/23 0913   SpO2 100 % 10/02/23 0913         No case tracking events are documented in the log.      Pain/Renetta Score: Renetta Score: 7 (10/2/2023  9:06 AM)

## 2023-10-02 NOTE — TRANSFER OF CARE
Anesthesia Transfer of Care Note    Patient: Yaron Brennan    Procedure(s) Performed: Procedure(s) (LRB):  LARYNGOSCOPY, DIRECT (N/A)    Patient location: PACU    Anesthesia Type: general    Transport from OR: Transported from OR on room air with adequate spontaneous ventilation    Post pain: adequate analgesia    Post assessment: no apparent anesthetic complications and tolerated procedure well    Post vital signs: stable    Level of consciousness: sedated    Nausea/Vomiting: no nausea/vomiting    Complications: none    Transfer of care protocol was followed      Last vitals:   Visit Vitals  /89   Pulse 67   Temp 36.5 °C (97.7 °F) (Temporal)   Resp 18   Wt 72.6 kg (160 lb)   SpO2 98%   BMI 23.63 kg/m²

## 2023-10-02 NOTE — OP NOTE
Otolaryngology Operative Note    Date of procedure: 10/02/2023    Attending Surgeon: Mekhi Grossman MD    Resident Surgeon: Caitlyn Gordillo PGY V    Pre-operative diagnosis:  1. H/o SCCa of base of tongue  2. H/o chemoradiation to the head and neck  3. Epiglottic mass      Post-operative diagnosis:   1. H/o SCCa of base of tongue  2. H/o chemoradiation to the head and neck  3. Mucocele of laryngeal surface of epiglottis    Procedure:  1. Direct laryngoscopy with biopsy    Anesthesia: general    Complications: none    Specimens:   ID Type Source Tests Collected by Time Destination   A : Epiglottic Lesion Tissue Epiglottis SPECIMEN TO PATHOLOGY Mekhi Grossman MD 10/2/2023 0843        Blood loss: 1cc    Indication:  Yaron Brennan is a 58 y.o. 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 He was in clinic for regular surveillance when he was noted to have a polypoid lesion of his epiglottis. We discussed proceeding with a DL and biopsy which he agreed with. After risks, benefits and alternatives were discussed patient was scheduled for this on 10/2/2023 at [facility] in [place].    Findings:   Mucocele of laryngeal surface of epiglottis which was opened and purulent appearing secretions were suctioned. Biopsy specimen sent for permanent pathology. No other lesions noted on DL.         Procedure in detail:  The patient was brought to the operating theater and placed supine on the operating table.  General endotracheal anesthesia was induced.  The bed was rotated 90 degrees. The patient was draped and time out was performed confirming correct patient and procedure. Bimanual head and neck was first performed. Thorough palpation of the base of tongue and tonsillar fossae were done with no masses or lesions noted. The neck was palpated and no cervical lymphadenopathy was noted. Wet gauze were used to protect the maxillary gingiva. The dedo laryngoscope was then passed. The  tonsillar fossa and base of tongue, vallecula were first inspected with no concerning lesions. The dedo was then passed into both piriform recesses and the post-cricoid region. The laryngeal surface of the epiglottis was then inspected and what appeared to be a mucocele was noted. The scope was passed to the level of the glottis which appeared normal. The dedo was then suspended above the mucocele. Cupped forceps were used to enter the cyst where mucopurulent filling was noted and suctioned away. The cupped forceps were used to send the cyst walls for permament pathology. Photodocumentation was obtained before and after entering the cyst which can be seen above. An afrin soaked valdez was then used for hemostasis.     The patient tolerated the procedure well and without complications.  The patient's care was delivered into the hands of the anesthesia team thereafter.  All counts were correct at the end of the procedure.    Dr. Grossman was present available for the entirety of the procedure.     10/2/2023  8:51 AM    Caitlyn Gordillo MD  [facility] Otolaryngology, PGY V

## 2023-10-02 NOTE — ANESTHESIA PROCEDURE NOTES
Intubation    Date/Time: 10/2/2023 8:33 AM    Performed by: Ravi Robertson CRNA  Authorized by: Diana Sarmiento MD    Intubation:     Induction:  Intramuscular    Intubated:  Postinduction    Mask Ventilation:  Easy with oral airway    Attempts:  1    Attempted By:  Student    Method of Intubation:  Direct    Blade:  Torres 2    Laryngeal View Grade: Grade IIA - cords partially seen      Difficult Airway Encountered?: No      Complications:  None    Airway Device:  Oral endotracheal tube    Airway Device Size:  6.0    Style/Cuff Inflation:  Cuffed (inflated to minimal occlusive pressure)    Inflation Amount (mL):  8    Tube secured:  24    Secured at:  The lips    Placement Verified By:  Capnometry and Colorimetric ETCO2 device    Complicating Factors:  None    Findings Post-Intubation:  BS equal bilateral and atraumatic/condition of teeth unchanged

## 2023-10-03 VITALS
RESPIRATION RATE: 20 BRPM | BODY MASS INDEX: 23.63 KG/M2 | WEIGHT: 160 LBS | OXYGEN SATURATION: 100 % | TEMPERATURE: 98 F | SYSTOLIC BLOOD PRESSURE: 138 MMHG | DIASTOLIC BLOOD PRESSURE: 92 MMHG | HEART RATE: 74 BPM

## 2023-10-04 LAB
ESTROGEN SERPL-MCNC: NORMAL PG/ML
INSULIN SERPL-ACNC: NORMAL U[IU]/ML
LAB AP CLINICAL INFORMATION: NORMAL
LAB AP GROSS DESCRIPTION: NORMAL
LAB AP REPORT FOOTNOTES: NORMAL
T3RU NFR SERPL: NORMAL %

## 2023-10-11 LAB — CRC RECOMMENDATION EXT: NORMAL

## 2023-10-18 ENCOUNTER — OFFICE VISIT (OUTPATIENT)
Dept: OTOLARYNGOLOGY | Facility: CLINIC | Age: 58
End: 2023-10-18
Payer: MEDICAID

## 2023-10-18 VITALS
HEART RATE: 76 BPM | TEMPERATURE: 99 F | DIASTOLIC BLOOD PRESSURE: 78 MMHG | HEIGHT: 69 IN | WEIGHT: 167 LBS | SYSTOLIC BLOOD PRESSURE: 124 MMHG | RESPIRATION RATE: 16 BRPM | BODY MASS INDEX: 24.73 KG/M2

## 2023-10-18 DIAGNOSIS — J38.7 LARYNGEAL CYST: Primary | ICD-10-CM

## 2023-10-18 DIAGNOSIS — C01 SQUAMOUS CELL CARCINOMA OF BASE OF TONGUE: ICD-10-CM

## 2023-10-18 PROCEDURE — 99214 OFFICE O/P EST MOD 30 MIN: CPT | Mod: PBBFAC | Performed by: OTOLARYNGOLOGY

## 2023-10-18 NOTE — PROGRESS NOTES
[facility]  Otolaryngology Clinic Note    Yaron Brennan  Encounter Date: 10/18/2023  YOB: 1965  Physician: Mekhi Grossman    Chief Complaint: Otalgia, neck mass    HPI: Yaron Brennan is a 58 y.o. male with HTN who presents as referral from Dr. Welsh for squamous cell carcinoma of base of tongue. Patient reports that toward the beginning of the year he started to have worsening left otalgia. He then noticed a bump/swelling on left neck that he thought was was due to a bug bite. This got progressively bigger over time and he then noticed some swelling on right neck as well. He eventually presented to [facility] in early July 2022. He had a CT neck that showed an ulcerated mass in the tongue base, and underwent direct laryngoscopy with ENT on 7/6/22 (Dr. Tay). Operative findings: ulcerated friable mass base of the tongue that seems confined to base of tongue; lingual surface of epiglottis is free from tumor; mass involves bilateral base of tongue, significantly midline. Results returned positive for p16+ SCCa. Today in clinic patient reports bilateral L>R otalgia, mild dysphagia/odynophagia. He reports he eats basically whatever he wants without overt signs of aspiration. He does report 40lb weight loss in the last 6 months. + Neck LAD. Reports voice changes. Denies any issues breathing. Smoked 1ppd since 17 years old (42 years) but quit a month ago when he got his diagnosis. Also previously drank 12 pack of beer a day and likewise quite a month ago. Referred to ENT for consideration of possible surgical resection.     07/05/2022: CT soft tissue of the neck without contrast:  -ulcerated tongue base mass, at least 3.4 cm by 3.0 cm x 3.1 cm; associated bulky level 2 and level 3 cervical lymphadenopathy; suspected level 1 and level 4 cervical lymphadenopathy; a right level 2 lymph node with central necrosis 2.6 x 3.3 cm; a left level 3 lymph node 4.5 x 3.2 cm; no  significant airway compromise; no acute or aggressive bony lesions  -malignant ulcerated type mass with lymph node metastasis; no airway compromise; right upper lung lobe indistinct ground-glass pulmonary nodules typical of benign infectious or inflammatory etiology    8/16/22: Here today for follow up. Had dental extractions performed on 8/5. Not having any issues. Taking in 4-5 ensures daily. Trying to take some other soft foods like grits and pudding.     9/22/22: Here for follow up. Patient is undergoing CRT and tolerating it well. He reports he has one more chemotherapy session left and about 2-3 weeks of radiation left. Overall maintaining his weight. No progressive dysphagia, swallowing fine. Also no breathing issues or other complaints.    11/3/22: Patient is here today for follow up.  He finished CRT on 10/10.  Patient states he is still in some pain in  his neck and inside of his mouth.   He is tolerating p.o. intake but states at times he does have difficulty swallowing.  He does have xerostomia.  Patient has not used his eardrops to loosen the cerumen impaction.  Denies any shortness of breath or difficulty breathing/ changes to voice,  changes to his neck masses.    12/7/22:  Patient returns for surveillance today.  Patient denies any new dysphagia, odynophagia, H&N lumps or bumps, otalgia, weight loss.  Overall pain is getting better, tolerating diet, gaining weight.    03/14/2023 doing well, no pain, no otalgia, dysphagia has improved significantly, weight is stable.  No hemoptysis.  Voice remains hoarse.  No airway distress.    4/11/2023: Returns today for follow-up. Reports doing well overall. No dysphagia, swallowing all foods without issue. Voice stable. No odynophagia, no SOB, no otalgia. Does note feeling like ears are clogged, has tried ear drops without success. Did get TSH since last visit, wnl.    5/18/23: Patient here today for follow up. Doing well since last visit. No major concerns.  Denies weight loss, new lumps/bumps, sore throat, dysphagia, odynophagia, or otalgia. Having some pain when chewing. Does not have dentures.     6/13/23: Returns today for follow-up/surveillance. Patient reports doing well overall. No issues with dysphagia, odynophagia, voice changes, otalgia, or weight loss. Still looking for dentist to get dentures made. Did get CT neck since last visit, but CXR and TSH still pending.     7/19/23: Here for surveillance. He is doing well. He is swallowing food of all consistencies without issue - no coughing or choking. He denies voice changes, weight loss, pain, or new lumps/bumps. He is breathing comfortably and active. Having trouble with sleep at night.    8/16/23: He has been doing very well since his last visit no issues at all.  He has eating a regular diet and no issues swallowing.  No voice changes lumps or bumps in her neck.    9/19/23:  Here today for follow-up after CT.  No issues in the interim.  Dysphagia and odynophagia are stable: Able to eat most things except for tough meat.  Weight has been stable.  No noted lumps/bumps.    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  Patient has done well postoperatively.  He still has a mild sore throat but this is improving.  He has no respiratory distress or dysphagia.  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  There was no evidence of malignancy.  Results were discussed with the patient.  Patient has no new problems today.    Review of patient's allergies indicates:  No Known Allergies    Past Medical History:   Diagnosis Date    Cancer     Hypertension        Past Surgical History:   Procedure Laterality Date    BACK SURGERY      DIRECT LARYNGOSCOPY N/A 08/05/2022    Procedure: LARYNGOSCOPY, DIRECT;  Surgeon:  Mekhi Grossman MD;  Location: [facility] OR;  Service: ENT;  Laterality: N/A;    DIRECT LARYNGOSCOPY N/A 10/02/2023    Procedure: LARYNGOSCOPY, DIRECT;  Surgeon: Mekhi Grossman MD;  Location: [facility] OR;  Service: ENT;  Laterality: N/A;    EXTRACTION OF TOOTH N/A 2022    Procedure: EXTRACTION, TEETH;  Surgeon: Mekhi Grossman MD;  Location: [facility] OR;  Service: ENT;  Laterality: N/A;    SPINE SURGERY      Not sure of date maybe in        Social History     Socioeconomic History    Marital status:      Spouse name: Blanca   Occupational History    Occupation: Disabled   Tobacco Use    Smoking status: Former     Current packs/day: 0.00     Types: Cigarettes     Start date: 1982     Quit date: 2022     Years since quittin.2    Smokeless tobacco: Never    Tobacco comments:     Been almost 35 yr   Substance and Sexual Activity    Alcohol use: Not Currently    Drug use: Yes     Types: Marijuana     Comment: Daily    Sexual activity: Yes     Partners: Female     Birth control/protection: None     Social Determinants of Health     Transportation Needs: No Transportation Needs (2022)    PRAPARE - Transportation     Lack of Transportation (Medical): No     Lack of Transportation (Non-Medical): No   Housing Stability: High Risk (2022)    Housing Stability Vital Sign     Unable to Pay for Housing in the Last Year: Yes     Number of Places Lived in the Last Year: 2     Unstable Housing in the Last Year: No       Family History   Problem Relation Age of Onset    Hypertension Mother     Cancer Father     Hypertension Sister     Hypertension Brother        Outpatient Encounter Medications as of 10/18/2023   Medication Sig Dispense Refill    amLODIPine (NORVASC) 10 MG tablet Take 1 tablet (10 mg total) by mouth once daily. 90 tablet 3    melatonin (MELATIN) 3 mg tablet Take 2 tablets (6 mg total) by mouth nightly as needed for Insomnia. 30 tablet 0    traZODone (DESYREL) 100 MG tablet Take 1  tablet (100 mg total) by mouth nightly as needed for Insomnia. 30 tablet 0    [DISCONTINUED] HYDROcodone-acetaminophen (NORCO) 7.5-325 mg per tablet TAKE 1 TABLET BY MOUTH EVERY 6 HOURS AS NEEDED FOR PAIN FOR UP TO 3 DAYS      [DISCONTINUED] hydrocortisone (PROCTOCORT) 10 % (80 mg) rectal foam Place 1 applicator rectally 2 (two) times daily as needed (hemorrhoidal flare). 15 g 1    [DISCONTINUED] LIDOcaine HCl 2% (XYLOCAINE) 2 % Soln Swish and spit with 15 mL every 6 hours as needed. Do not swallow. 100 mL 0    [DISCONTINUED] methocarbamoL (ROBAXIN) 750 MG Tab TAKE 1 TABLET BY MOUTH IN THE MORNING AND 1 TABLET AT NOON AND 1 TABLET IN THE EVENING AND 1 TABLET BEFORE BEDTIME . DO ALL THIS FOR 4 DAYS      [DISCONTINUED] mometasone (ELOCON) 0.1 % ointment Apply topically 2 (two) times daily.      [DISCONTINUED] naloxone (NARCAN) 4 mg/actuation Spry SMARTSIG:Both Nares      [DISCONTINUED] nicotine (NICODERM CQ) 21 mg/24 hr 1 patch once daily.      [DISCONTINUED] SPS, WITH SORBITOL, 15-20 gram/60 mL Susp Take by mouth.      [DISCONTINUED] traMADoL (ULTRAM) 50 mg tablet Take 50 mg by mouth every 6 (six) hours as needed.      [DISCONTINUED] traZODone (DESYREL) 100 MG tablet Take 1 tablet (100 mg total) by mouth nightly as needed for Insomnia. 30 tablet 0     Facility-Administered Encounter Medications as of 10/18/2023   Medication Dose Route Frequency Provider Last Rate Last Admin    lactated ringers infusion   Intravenous Continuous Elzbieta Quesada MD 10 mL/hr at 08/05/22 0808 New Bag at 08/05/22 0808    LIDOcaine (PF) 10 mg/ml (1%) injection 10 mg  1 mL Intradermal Once Yajaira Watts FNP        LIDOcaine (PF) 10 mg/ml (1%) injection 10 mg  1 mL Intradermal Once Yajaira Watts FNP        LIDOcaine (PF) 40 mg/mL (4 %) injection 2 mL  2 mL Other 1 time in Clinic/HOD Tony Pepe MD        phenylephrine HCL 1% nasal spray 1 spray  1 spray Each Nostril 1 time in Clinic/HOD Tony Pepe MD      "   sodium chloride 0.9% flush 10 mL  10 mL Intravenous PRN Anna Taylor MD        sodium chloride 0.9% flush 10 mL  10 mL Intravenous PRN Jose Johns MD           Physical Exam:  /78 (BP Location: Right arm, Patient Position: Sitting, BP Method: Medium (Automatic))   Pulse 76   Temp 98.6 °F (37 °C) (Oral)   Resp 16   Ht 5' 9" (1.753 m)   Wt 75.8 kg (167 lb)   BMI 24.66 kg/m²   Patient was not examined today.          Pertinent Data:  CT neck 6/08/23  FINDINGS:  No definite measurable tongue mass on today's exam.  Slightly asymmetric thickening of the posterior tongue on the right greater than left with continued interval improvement.  Right level 2 cervical lymph node measures 7 mm on today's exam short axis image 52 series 2 compared to 1 cm on the prior.  Residual scattered lymph nodes small by imaging criteria.  Mucosal edema again seen along the region of the inferior pharynx, larynx went.  Superimposed malignancy at the level of the supraglottic trachea, distal hypopharyngeal region not excluded. Thyroid gland normal.  Vascular structures intact.  Mild treatment related subcutaneous adipose tissue edema, thickening anterior and lateral neck bilaterally.  Paraspinal muscles normal. Bones grossly intact.  Visualized intracranial structures normal.  Paranasal sinuses grossly a normal.  Intraorbital contents in normal.  Impression:  Continued improvement in the tongue mass with only slight asymmetric thickening at the posterior tongue on the right.  No measurable mass.  Lymphadenopathy improved with the index node in the right level 2 region smaller in the interval.  No enlarged lymph nodes on today's exam.  Continued submucosal thickening at the pharynx and laryngeal level likely post treatment related changes with superimposed residual malignancy not excluded.      CT soft tissue neck (08/25/2023):    FINDINGS:  There are post treatment changes of the neck with residual mucosal edema.  There is no " definite new or progressive suspicious nodularity.  There are no new or enlarging cervical lymph nodes.     The parotid glands, submandibular glands and thyroid gland are unremarkable.  The major vessels in the neck are patent.  There is no acute abnormality of the orbits or visualized brain parenchyma.  There is no destructive bone lesion.  There is trace scattered paranasal sinus mucosal thickening.  There are mild emphysematous changes in the lung apices.     Impression:     Post treatment changes of the neck without evidence of new or progressive metastatic disease.      Assessment/Plan:  Yaron Brennan is a 58 y.o. 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 CRT, finished 10/10/22.  Most recent CT (8/23) without evidence of disease. PET 1/11/23 without disease (in [facility] system), TSH wnl 7/20/23.      Status post direct laryngoscopy with biopsy of laryngeal cyst with pathology indicating benign laryngeal ductal cyst on October 2, 2023-doing well.    Plan:   Follow-up in 2 months for routine surveillance.    Mekhi Grossman M.D.            HEAD & NECK CANCER SURVEILLANCE   Radiation and/or Chemotherapy     Medical Oncologist: Kevon Welsh MD  Radiation Oncologist: Unknown    Primary tumor: cT3 cN2 M0 p16+ base of tongue    Date of Diagnosis: 7/6/22  Therapy: Cisplatin 100 mg/m² IV days 1, 22, and 23, concurrent with RT  Completion Date: 10/10/22    Pertinent Treatment History:   First presentation 7/2022  CT neck 7/5/22 & 7/29/22  Panendoscopy 7/6/22  Right UE DVT 2/2 PICC 9/15/22 on Xarelto  CRT 8/25/22 - 10/10/22  Direct laryngoscopy with biopsy of benign laryngeal ductal cyst on October 2, 2023.      Place date if completed   3 6 12 18 24 36 48 60   CT Neck 1/31/23 6/8/23 8/25/23 X X X X X   PET/CT 1/11/23          CXR  7/17/23 X X X X X X   TFT 2/2/23 7/20/23 X  X X X X     Current Surveillance Interval:  1  year; every 6-8 weeks

## 2023-10-27 DIAGNOSIS — G47.09 OTHER INSOMNIA: ICD-10-CM

## 2023-10-29 RX ORDER — TRAZODONE HYDROCHLORIDE 100 MG/1
100 TABLET ORAL NIGHTLY PRN
Qty: 30 TABLET | Refills: 0 | Status: SHIPPED | OUTPATIENT
Start: 2023-10-29 | End: 2023-12-01 | Stop reason: SDUPTHER

## 2023-12-01 ENCOUNTER — HOSPITAL ENCOUNTER (OUTPATIENT)
Dept: RADIOLOGY | Facility: HOSPITAL | Age: 58
Discharge: HOME | End: 2023-12-01
Attending: NURSE PRACTITIONER
Payer: MEDICAID

## 2023-12-01 ENCOUNTER — OFFICE VISIT (OUTPATIENT)
Dept: INTERNAL MEDICINE | Facility: CLINIC | Age: 58
End: 2023-12-01
Payer: MEDICAID

## 2023-12-01 VITALS
HEIGHT: 69 IN | TEMPERATURE: 98 F | RESPIRATION RATE: 18 BRPM | WEIGHT: 176.56 LBS | HEART RATE: 76 BPM | DIASTOLIC BLOOD PRESSURE: 81 MMHG | BODY MASS INDEX: 26.15 KG/M2 | SYSTOLIC BLOOD PRESSURE: 131 MMHG

## 2023-12-01 DIAGNOSIS — F19.982 DRUG-INDUCED INSOMNIA: Chronic | ICD-10-CM

## 2023-12-01 DIAGNOSIS — M54.50 ACUTE MIDLINE LOW BACK PAIN WITHOUT SCIATICA: ICD-10-CM

## 2023-12-01 DIAGNOSIS — G47.09 OTHER INSOMNIA: ICD-10-CM

## 2023-12-01 DIAGNOSIS — Z72.0 TOBACCO ABUSE: Chronic | ICD-10-CM

## 2023-12-01 DIAGNOSIS — I10 PRIMARY HYPERTENSION: Primary | Chronic | ICD-10-CM

## 2023-12-01 DIAGNOSIS — D64.9 ANEMIA, UNSPECIFIED TYPE: ICD-10-CM

## 2023-12-01 PROCEDURE — 3079F DIAST BP 80-89 MM HG: CPT | Mod: CPTII,,, | Performed by: NURSE PRACTITIONER

## 2023-12-01 PROCEDURE — 3044F PR MOST RECENT HEMOGLOBIN A1C LEVEL <7.0%: ICD-10-PCS | Mod: CPTII,,, | Performed by: NURSE PRACTITIONER

## 2023-12-01 PROCEDURE — 3066F NEPHROPATHY DOC TX: CPT | Mod: CPTII,,, | Performed by: NURSE PRACTITIONER

## 2023-12-01 PROCEDURE — 1159F MED LIST DOCD IN RCRD: CPT | Mod: CPTII,,, | Performed by: NURSE PRACTITIONER

## 2023-12-01 PROCEDURE — 3008F BODY MASS INDEX DOCD: CPT | Mod: CPTII,,, | Performed by: NURSE PRACTITIONER

## 2023-12-01 PROCEDURE — 3079F PR MOST RECENT DIASTOLIC BLOOD PRESSURE 80-89 MM HG: ICD-10-PCS | Mod: CPTII,,, | Performed by: NURSE PRACTITIONER

## 2023-12-01 PROCEDURE — 99215 OFFICE O/P EST HI 40 MIN: CPT | Mod: PBBFAC | Performed by: NURSE PRACTITIONER

## 2023-12-01 PROCEDURE — 3061F PR NEG MICROALBUMINURIA RESULT DOCUMENTED/REVIEW: ICD-10-PCS | Mod: CPTII,,, | Performed by: NURSE PRACTITIONER

## 2023-12-01 PROCEDURE — 3066F PR DOCUMENTATION OF TREATMENT FOR NEPHROPATHY: ICD-10-PCS | Mod: CPTII,,, | Performed by: NURSE PRACTITIONER

## 2023-12-01 PROCEDURE — 3008F PR BODY MASS INDEX (BMI) DOCUMENTED: ICD-10-PCS | Mod: CPTII,,, | Performed by: NURSE PRACTITIONER

## 2023-12-01 PROCEDURE — 1159F PR MEDICATION LIST DOCUMENTED IN MEDICAL RECORD: ICD-10-PCS | Mod: CPTII,,, | Performed by: NURSE PRACTITIONER

## 2023-12-01 PROCEDURE — 1160F RVW MEDS BY RX/DR IN RCRD: CPT | Mod: CPTII,,, | Performed by: NURSE PRACTITIONER

## 2023-12-01 PROCEDURE — 99214 OFFICE O/P EST MOD 30 MIN: CPT | Mod: 25,S$PBB,, | Performed by: NURSE PRACTITIONER

## 2023-12-01 PROCEDURE — 3044F HG A1C LEVEL LT 7.0%: CPT | Mod: CPTII,,, | Performed by: NURSE PRACTITIONER

## 2023-12-01 PROCEDURE — 72100 X-RAY EXAM L-S SPINE 2/3 VWS: CPT | Mod: TC

## 2023-12-01 PROCEDURE — 99214 PR OFFICE/OUTPT VISIT, EST, LEVL IV, 30-39 MIN: ICD-10-PCS | Mod: 25,S$PBB,, | Performed by: NURSE PRACTITIONER

## 2023-12-01 PROCEDURE — 3075F SYST BP GE 130 - 139MM HG: CPT | Mod: CPTII,,, | Performed by: NURSE PRACTITIONER

## 2023-12-01 PROCEDURE — 3075F PR MOST RECENT SYSTOLIC BLOOD PRESS GE 130-139MM HG: ICD-10-PCS | Mod: CPTII,,, | Performed by: NURSE PRACTITIONER

## 2023-12-01 PROCEDURE — 3061F NEG MICROALBUMINURIA REV: CPT | Mod: CPTII,,, | Performed by: NURSE PRACTITIONER

## 2023-12-01 PROCEDURE — 1160F PR REVIEW ALL MEDS BY PRESCRIBER/CLIN PHARMACIST DOCUMENTED: ICD-10-PCS | Mod: CPTII,,, | Performed by: NURSE PRACTITIONER

## 2023-12-01 RX ORDER — TRAZODONE HYDROCHLORIDE 100 MG/1
100 TABLET ORAL NIGHTLY
Qty: 30 TABLET | Refills: 6 | Status: SHIPPED | OUTPATIENT
Start: 2023-12-01

## 2023-12-01 RX ORDER — DICLOFENAC SODIUM 75 MG/1
75 TABLET, DELAYED RELEASE ORAL 2 TIMES DAILY PRN
Qty: 60 TABLET | Refills: 2 | Status: SHIPPED | OUTPATIENT
Start: 2023-12-01 | End: 2024-01-03 | Stop reason: ALTCHOICE

## 2023-12-01 RX ORDER — ONDANSETRON 4 MG/1
TABLET, ORALLY DISINTEGRATING ORAL
COMMUNITY
End: 2023-12-14 | Stop reason: SDUPTHER

## 2023-12-01 RX ORDER — BACLOFEN 10 MG/1
10 TABLET ORAL 3 TIMES DAILY PRN
Qty: 60 TABLET | Refills: 2 | Status: SHIPPED | OUTPATIENT
Start: 2023-12-01 | End: 2023-12-12 | Stop reason: ALTCHOICE

## 2023-12-01 NOTE — PROGRESS NOTES
Bia Tucker, SANDY   [facility]  [address] [address]      PATIENT NAME: Yaron Brennan  : 1965  DATE: 23  MRN: [id number]      Reason for Visit / Chief Complaint: Follow-up (Lab results, refused vaccines, c/o LBP x 1 m)       History of Present Illness / Problem Focused Workflow     Yaron Brennan is a 58 y.o. Black or  male presents to the clinic for HTN f/u. 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 Followed by [facility] oncology and ENT clinics.     Since last visit, pt continues routine surveillance with ENT and oncology and has upcoming visits. Has completed colonoscopy with Dr. Louise; records requested. States revealed hemorrhoids. Reports med compliance. BP at goal. Is able to eat solid foods without difficulty. Is sleeping well with trazodone. Labs reviewed with pt. Reports intermittent, sharp LBP x 1 month, worse with sits or in one position too long and improved with tylenol but returns. Reports a little tingling down right leg maybe once or twice. Has continued smoking cessation since 2022. Denies chest pain, shortness of breath, cough, fever, headache, dizziness, weakness, abdominal pain, nausea, vomiting, diarrhea, constipation, dysuria, depression, anxiety, SI/HI.      Review of Systems     Review of Systems     See HPI for details    Constitutional: Denies Change in appetite. Denies Chills. Denies Fever. Denies Night sweats.  Eye: Denies Blurred vision. Denies Discharge. Denies Eye pain.  ENT: Denies Decreased hearing. Denies Sore throat. Denies Swollen glands.  Respiratory: Denies Cough. Denies Shortness of breath. Denies Shortness of breath with exertion. Denies Wheezing.  Cardiovascular: DeniesChest pain at rest. Denies Chest pain with exertion. Denies Irregular heartbeat. Denies Palpitations.  Denies Edema.  Gastrointestinal: Denies Abdominal pain. Denies Diarrhea. Denies Nausea. Denies Vomiting. Denies Hematemesis or Hematochezia.  Genitourinary: Denies Dysuria. Denies Urinary frequency. Denies Urinary urgency. Denies Blood in urine.  Endocrine: Denies Cold intolerance. Denies Excessive thirst. Denies Heat intolerance. Denies Weight loss. Denies Weight gain.  Musculoskeletal: Admits Painful joints. Denies Weakness.  Integumentary: Denies Rash. Denies Itching. Denies Dry skin.  Neurologic: Denies Dizziness. Denies Fainting. Denies Headache.  Psychiatric: Denies Depression. Denies Anxiety. Denies Suicidal/Homicidal ideations.    All Other ROS: Negative except as stated in HPI.     Medical / Surgical / Social / Family History       ----------------------------  Cancer  Hypertension     Past Surgical History:   Procedure Laterality Date    BACK SURGERY      DIRECT LARYNGOSCOPY N/A 2022    Procedure: LARYNGOSCOPY, DIRECT;  Surgeon: Mekhi Grossman MD;  Location: [facility];  Service: ENT;  Laterality: N/A;    DIRECT LARYNGOSCOPY N/A 10/02/2023    Procedure: LARYNGOSCOPY, DIRECT;  Surgeon: Mekhi Grossman MD;  Location: [facility];  Service: ENT;  Laterality: N/A;    EXTRACTION OF TOOTH N/A 2022    Procedure: EXTRACTION, TEETH;  Surgeon: Mekhi Grossman MD;  Location: [facility];  Service: ENT;  Laterality: N/A;    SPINE SURGERY      Not sure of date maybe in        Social History     Socioeconomic History    Marital status:      Spouse name: Blanca   Occupational History    Occupation: Disabled   Tobacco Use    Smoking status: Former     Current packs/day: 0.00     Types: Cigarettes     Start date: 1982     Quit date: 2022     Years since quittin.4    Smokeless tobacco: Never    Tobacco comments:     Been almost 35 yr   Substance and Sexual Activity    Alcohol use: Not Currently    Drug use: Yes     Types: Marijuana     Comment: Daily    Sexual activity: Yes     Partners:  "Female     Birth control/protection: None     Social Determinants of Health     Financial Resource Strain: Low Risk  (12/1/2023)    Overall Financial Resource Strain (CARDIA)     Difficulty of Paying Living Expenses: Not very hard   Food Insecurity: No Food Insecurity (12/1/2023)    Hunger Vital Sign     Worried About Running Out of Food in the Last Year: Never true     Ran Out of Food in the Last Year: Never true   Transportation Needs: No Transportation Needs (12/1/2022)    PRAPARE - Transportation     Lack of Transportation (Medical): No     Lack of Transportation (Non-Medical): No   Physical Activity: Inactive (12/1/2023)    Exercise Vital Sign     Days of Exercise per Week: 0 days     Minutes of Exercise per Session: 0 min   Stress: No Stress Concern Present (12/1/2023)    [employer] - Occupational Stress Questionnaire     Feeling of Stress : Only a little   Housing Stability: High Risk (12/1/2022)    Housing Stability Vital Sign     Unable to Pay for Housing in the Last Year: Yes     Number of Places Lived in the Last Year: 2     Unstable Housing in the Last Year: No        Family History   Problem Relation Age of Onset    Hypertension Mother     Cancer Father     Hypertension Sister     Hypertension Brother         Medications and Allergies     Medications  Current Outpatient Medications   Medication Instructions    amLODIPine (NORVASC) 10 mg, Oral, Daily    baclofen (LIORESAL) 10 mg, Oral, 3 times daily PRN    diclofenac (VOLTAREN) 75 mg, Oral, 2 times daily PRN    melatonin (MELATIN) 6 mg, Oral, Nightly PRN    ondansetron (ZOFRAN-ODT) 4 MG TbDL Oral for 3 Days    traZODone (DESYREL) 100 mg, Oral, Nightly         Allergies  Review of patient's allergies indicates:  No Known Allergies    Physical Examination     /81 (BP Location: Right arm, Patient Position: Sitting, BP Method: Large (Automatic))   Pulse 76   Temp 97.7 °F (36.5 °C) (Oral)   Resp 18   Ht 5' 9.02" (1.753 " m)   Wt 80.1 kg (176 lb 9.4 oz)   BMI 26.07 kg/m²     Physical Exam  Musculoskeletal:      Lumbar back: Spasms and tenderness present.        Back:          General: Alert and oriented, No acute distress.  Head: Normocephalic, Atraumatic.  Eye: Pupils are equal, round and reactive to light, Extraocular movements are intact, Sclera non-icteric.  Ears/Nose/Throat: Normal, Mucosa moist,Clear.  Neck/Thyroid: Supple, Non-tender, No carotid bruit, No lymphadenopathy, No JVD, Full range of motion.  Respiratory: Clear to auscultation bilaterally; No wheezes, rales or rhonchi,Non-labored respirations, Symmetrical chest wall expansion.  Cardiovascular: Regular rate and rhythm, S1/S2 normal, No murmurs, rubs or gallops.  Gastrointestinal: Soft, Non-tender, Non-distended, Normal bowel sounds, No palpable organomegaly.  Musculoskeletal: Normal range of motion.  Integumentary: Warm, Dry, Intact, No suspicious lesions or rashes.  Extremities: No clubbing, cyanosis or edema  Neurologic: No focal deficits, Cranial Nerves II-XII are grossly intact, Motor strength normal upper and lower extremities, Sensory exam intact.  Psychiatric: Normal interaction, Coherent speech, Euthymic mood, Appropriate affect       Results     Lab Results   Component Value Date    WBC 5.78 12/01/2023    HGB 13.3 (L) 12/01/2023    HCT 40.8 (L) 12/01/2023     12/01/2023    CHOL 203 (H) 12/01/2023    TRIG 66 12/01/2023    ALT 7 12/01/2023    AST 13 12/01/2023     12/01/2023    K 3.9 12/01/2023    CREATININE 1.29 (H) 12/01/2023    BUN 12.6 12/01/2023    CO2 29 12/01/2023    TSH 1.589 07/20/2023    PSA 3.01 12/01/2023    HGBA1C 5.4 12/01/2023         Assessment and Plan (including Health Maintenance)     Plan:     1. Primary hypertension  At goal.  Continue norvasc.  Follow a low sodium (less than 2 grams of sodium per day), DASH diet.   Continue medications as prescribed.  Monitor blood pressure and report any consistent values greater than  140/90 and keep a log.  Encouraged continued smoking cessation to aid in BP reduction and co-morbidities.   Maintain healthy weight with a BMI goal of <30.   Aerobic exercise for 150 minutes per week (or 5 days a week for 30 minutes each day).      2. Drug-induced insomnia  Refilled trazodone.  Avoid caffeine, alcohol and stimulants.  Do not use illicit drugs.  Practice positive phrases and repeat throughout the day, yoga, lavender scents or Chamomile tea to promote relaxation.  Set healthy boundaries, avoid people and conversations that increase stress.  Power down electronic devices at least one hour prior to bedtime.  Keep room dark; use eye mask or relaxation sound machine to promote rest.  Sleep hygiene refers to actions that tend to improve and maintain good sleep:  Sleep as long as necessary to feel rested (usually seven to eight hours for adults) and then get out of bed  Maintain a regular sleep schedule, particularly a regular wake-up time in the morning  Avoid smoking or other nicotine intake, particularly during the evening  Avoid daytime naps, especially if they are longer than 20 to 30 minutes or occur late in the day.      3. Acute midline low back pain without sciatica  XR lumbar today; will notify of abn results.  Referral to PT to eval/treat.  Rx baclofen and diclofenac prn.  Perform back stretches daily.   Avoid activities than increase back pain or stiffness.   Apply heating pad, ice pack, and BioFreeze as needed; alternate every 15-20 minutes.   Massage back to loosen muscles.   Continue tylenol arthritis/NSAID/muscle relaxer as needed.     - baclofen (LIORESAL) 10 MG tablet; Take 1 tablet (10 mg total) by mouth 3 (three) times daily as needed (muscle pain or spasm).  Dispense: 60 tablet; Refill: 2  - diclofenac (VOLTAREN) 75 MG EC tablet; Take 1 tablet (75 mg total) by mouth 2 (two) times daily as needed (pain).  Dispense: 60 tablet; Refill: 2  - X-Ray Lumbar Spine 2 Or 3 Views; Future  -  Ambulatory referral/consult to Physical/Occupational Therapy; Future    4. Tobacco abuse  Smoking cessation discussed; total time 3 mins   Pt quit 7/2022.  Encouraged and congratulated on continued cessation.  Discussed benefits of quitting including improved health, decreased cardiac/vascular/pulmonary/stroke risks as well as saving money.          Problem List Items Addressed This Visit          Psychiatric    Drug-induced insomnia (Chronic)       Cardiac/Vascular    Primary hypertension - Primary (Chronic)       Oncology    Anemia       Orthopedic    Acute midline low back pain without sciatica    Relevant Medications    baclofen (LIORESAL) 10 MG tablet    diclofenac (VOLTAREN) 75 MG EC tablet    Other Relevant Orders    X-Ray Lumbar Spine 2 Or 3 Views    Ambulatory referral/consult to Physical/Occupational Therapy       Other    Tobacco abuse (Chronic)     Other Visit Diagnoses       Other insomnia        Relevant Medications    traZODone (DESYREL) 100 MG tablet              Health Maintenance Due   Topic Date Due    COVID-19 Vaccine (1) Never done       Follow up in about 4 weeks (around 12/29/2023) for Follow up LBP, Virtual Visit.        Signature:  SANDY Brandt  [facility]  [address] [address]

## 2023-12-08 ENCOUNTER — DOCUMENTATION ONLY (OUTPATIENT)
Dept: INTERNAL MEDICINE | Facility: CLINIC | Age: 58
End: 2023-12-08
Payer: MEDICAID

## 2023-12-12 ENCOUNTER — TELEPHONE (OUTPATIENT)
Dept: INTERNAL MEDICINE | Facility: CLINIC | Age: 58
End: 2023-12-12
Payer: MEDICAID

## 2023-12-12 RX ORDER — CYCLOBENZAPRINE HCL 5 MG
5 TABLET ORAL EVERY 8 HOURS PRN
Qty: 60 TABLET | Refills: 1 | Status: SHIPPED | OUTPATIENT
Start: 2023-12-12 | End: 2024-01-03 | Stop reason: ALTCHOICE

## 2023-12-12 NOTE — TELEPHONE ENCOUNTER
Patient called requesting either a stronger dosage on baclofen  or a different medication for back spams . States  the medication isn't helping him.

## 2023-12-12 NOTE — TELEPHONE ENCOUNTER
Inform pt to stop the baclofen. I have sent rx for cyclobenzaprine prn. He may increase to 2 tabs q8hrs prn in needed. Thanks

## 2023-12-14 RX ORDER — ONDANSETRON 4 MG/1
4 TABLET, ORALLY DISINTEGRATING ORAL EVERY 8 HOURS PRN
Qty: 15 TABLET | Refills: 1 | Status: SHIPPED | OUTPATIENT
Start: 2023-12-14 | End: 2024-02-27 | Stop reason: SDUPTHER

## 2023-12-19 ENCOUNTER — OFFICE VISIT (OUTPATIENT)
Dept: OTOLARYNGOLOGY | Facility: CLINIC | Age: 58
End: 2023-12-19
Payer: MEDICAID

## 2023-12-19 VITALS
HEIGHT: 69 IN | SYSTOLIC BLOOD PRESSURE: 126 MMHG | WEIGHT: 174 LBS | TEMPERATURE: 99 F | BODY MASS INDEX: 25.77 KG/M2 | DIASTOLIC BLOOD PRESSURE: 81 MMHG | HEART RATE: 87 BPM | RESPIRATION RATE: 16 BRPM

## 2023-12-19 DIAGNOSIS — C01 SQUAMOUS CELL CARCINOMA OF BASE OF TONGUE: Primary | ICD-10-CM

## 2023-12-19 PROCEDURE — 31575 DIAGNOSTIC LARYNGOSCOPY: CPT | Mod: PBBFAC | Performed by: OTOLARYNGOLOGY

## 2023-12-19 PROCEDURE — 99214 OFFICE O/P EST MOD 30 MIN: CPT | Mod: PBBFAC | Performed by: OTOLARYNGOLOGY

## 2023-12-19 RX ORDER — LIDOCAINE HYDROCHLORIDE 40 MG/ML
1 INJECTION, SOLUTION RETROBULBAR
Status: DISCONTINUED | OUTPATIENT
Start: 2023-12-19 | End: 2024-02-07

## 2023-12-19 NOTE — PROGRESS NOTES
The scope used for the exam was:  Flexible scope ENF-P4  Serial Number:  1)    [id number]    []   2)    [id number]    [x]   3)    [id number]    []   4)    [id number]    []   5)    [id number]    []   6)    [id number]    []       The scope used for the exam was:  Rigid scope   Serial Number:  1)   [id number]    []   2)   [id number]    []   3)   [id number]    []   4)    3384   []   5)    0824   []   6)    5554   []     7)   7425    []   8)   2240    []   9)   1109    []

## 2023-12-19 NOTE — PROGRESS NOTES
[facility] and [facility]  Otolaryngology Clinic Note    Yaron Brennan  Encounter Date: 12/19/2023  YOB: 1965  Physician: Mekhi Grossman    Chief Complaint: Otalgia, neck mass    HPI: Yaron Brennan is a 58 y.o. male with HTN who presents as referral from Dr. Welsh for squamous cell carcinoma of base of tongue. Patient reports that toward the beginning of the year he started to have worsening left otalgia. He then noticed a bump/swelling on left neck that he thought was was due to a bug bite. This got progressively bigger over time and he then noticed some swelling on right neck as well. He eventually presented to [facility] in early July 2022. He had a CT neck that showed an ulcerated mass in the tongue base, and underwent direct laryngoscopy with ENT on 7/6/22 (Dr. Tay). Operative findings: ulcerated friable mass base of the tongue that seems confined to base of tongue; lingual surface of epiglottis is free from tumor; mass involves bilateral base of tongue, significantly midline. Results returned positive for p16+ SCCa. Today in clinic patient reports bilateral L>R otalgia, mild dysphagia/odynophagia. He reports he eats basically whatever he wants without overt signs of aspiration. He does report 40lb weight loss in the last 6 months. + Neck LAD. Reports voice changes. Denies any issues breathing. Smoked 1ppd since 17 years old (42 years) but quit a month ago when he got his diagnosis. Also previously drank 12 pack of beer a day and likewise quite a month ago. Referred to ENT for consideration of possible surgical resection.     07/05/2022: CT soft tissue of the neck without contrast:  -ulcerated tongue base mass, at least 3.4 cm by 3.0 cm x 3.1 cm; associated bulky level 2 and level 3 cervical lymphadenopathy; suspected level 1 and level 4 cervical lymphadenopathy; a right level 2 lymph node with central necrosis 2.6 x 3.3 cm; a left level 3 lymph node 4.5 x 3.2 cm; no  significant airway compromise; no acute or aggressive bony lesions  -malignant ulcerated type mass with lymph node metastasis; no airway compromise; right upper lung lobe indistinct ground-glass pulmonary nodules typical of benign infectious or inflammatory etiology    8/16/22: Here today for follow up. Had dental extractions performed on 8/5. Not having any issues. Taking in 4-5 ensures daily. Trying to take some other soft foods like grits and pudding.     9/22/22: Here for follow up. Patient is undergoing CRT and tolerating it well. He reports he has one more chemotherapy session left and about 2-3 weeks of radiation left. Overall maintaining his weight. No progressive dysphagia, swallowing fine. Also no breathing issues or other complaints.    11/3/22: Patient is here today for follow up.  He finished CRT on 10/10.  Patient states he is still in some pain in  his neck and inside of his mouth.   He is tolerating p.o. intake but states at times he does have difficulty swallowing.  He does have xerostomia.  Patient has not used his eardrops to loosen the cerumen impaction.  Denies any shortness of breath or difficulty breathing/ changes to voice,  changes to his neck masses.    12/7/22:  Patient returns for surveillance today.  Patient denies any new dysphagia, odynophagia, H&N lumps or bumps, otalgia, weight loss.  Overall pain is getting better, tolerating diet, gaining weight.    03/14/2023 doing well, no pain, no otalgia, dysphagia has improved significantly, weight is stable.  No hemoptysis.  Voice remains hoarse.  No airway distress.    4/11/2023: Returns today for follow-up. Reports doing well overall. No dysphagia, swallowing all foods without issue. Voice stable. No odynophagia, no SOB, no otalgia. Does note feeling like ears are clogged, has tried ear drops without success. Did get TSH since last visit, wnl.    5/18/23: Patient here today for follow up. Doing well since last visit. No major concerns.  Denies weight loss, new lumps/bumps, sore throat, dysphagia, odynophagia, or otalgia. Having some pain when chewing. Does not have dentures.     6/13/23: Returns today for follow-up/surveillance. Patient reports doing well overall. No issues with dysphagia, odynophagia, voice changes, otalgia, or weight loss. Still looking for dentist to get dentures made. Did get CT neck since last visit, but CXR and TSH still pending.     7/19/23: Here for surveillance. He is doing well. He is swallowing food of all consistencies without issue - no coughing or choking. He denies voice changes, weight loss, pain, or new lumps/bumps. He is breathing comfortably and active. Having trouble with sleep at night.    8/16/23: He has been doing very well since his last visit no issues at all.  He has eating a regular diet and no issues swallowing.  No voice changes lumps or bumps in her neck.    9/19/23:  Here today for follow-up after CT.  No issues in the interim.  Dysphagia and odynophagia are stable: Able to eat most things except for tough meat.  Weight has been stable.  No noted lumps/bumps.    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  Patient has done well postoperatively.  He still has a mild sore throat but this is improving.  He has no respiratory distress or dysphagia.  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  There was no evidence of malignancy.  Results were discussed with the patient.  Patient has no new problems today.    December 19, 2023:   Patient presents today for routine surveillance.  Patient is doing well.  He is tolerating his diet.  His only complaint is that of some xerostomia but this is not a significant problem.  He has no complaints of sore throat, ear pain, swelling in the neck, or difficulty  swallowing.  He has no other new problems today.    Review of patient's allergies indicates:  No Known Allergies    Past Medical History:   Diagnosis Date    Cancer     Hypertension        Past Surgical History:   Procedure Laterality Date    BACK SURGERY      COLONOSCOPY  10/11/2023    DIRECT LARYNGOSCOPY N/A 2022    Procedure: LARYNGOSCOPY, DIRECT;  Surgeon: Mekhi Grossman MD;  Location: [facility];  Service: ENT;  Laterality: N/A;    DIRECT LARYNGOSCOPY N/A 10/02/2023    Procedure: LARYNGOSCOPY, DIRECT;  Surgeon: Mekhi Grossman MD;  Location: [facility];  Service: ENT;  Laterality: N/A;    EXTRACTION OF TOOTH N/A 2022    Procedure: EXTRACTION, TEETH;  Surgeon: Mekhi Grossman MD;  Location: [facility];  Service: ENT;  Laterality: N/A;    SPINE SURGERY      Not sure of date maybe in        Social History     Socioeconomic History    Marital status:      Spouse name: Blanca   Occupational History    Occupation: Disabled   Tobacco Use    Smoking status: Former     Current packs/day: 0.00     Types: Cigarettes     Start date: 1982     Quit date: 2022     Years since quittin.4    Smokeless tobacco: Never    Tobacco comments:     Been almost 35 yr   Substance and Sexual Activity    Alcohol use: Not Currently    Drug use: Yes     Types: Marijuana     Comment: Daily    Sexual activity: Yes     Partners: Female     Birth control/protection: None     Social Determinants of Health     Financial Resource Strain: Low Risk  (2023)    Overall Financial Resource Strain (CARDIA)     Difficulty of Paying Living Expenses: Not very hard   Food Insecurity: No Food Insecurity (2023)    Hunger Vital Sign     Worried About Running Out of Food in the Last Year: Never true     Ran Out of Food in the Last Year: Never true   Transportation Needs: No Transportation Needs (2022)    PRAPARE - Transportation     Lack of Transportation (Medical): No     Lack of Transportation (Non-Medical):  No   Physical Activity: Inactive (12/1/2023)    Exercise Vital Sign     Days of Exercise per Week: 0 days     Minutes of Exercise per Session: 0 min   Stress: No Stress Concern Present (12/1/2023)    [employer] - Occupational Stress Questionnaire     Feeling of Stress : Only a little   Housing Stability: High Risk (12/1/2022)    Housing Stability Vital Sign     Unable to Pay for Housing in the Last Year: Yes     Number of Places Lived in the Last Year: 2     Unstable Housing in the Last Year: No       Family History   Problem Relation Age of Onset    Hypertension Mother     Cancer Father     Hypertension Sister     Hypertension Brother        Outpatient Encounter Medications as of 12/19/2023   Medication Sig Dispense Refill    amLODIPine (NORVASC) 10 MG tablet Take 1 tablet (10 mg total) by mouth once daily. 90 tablet 3    cyclobenzaprine (FLEXERIL) 5 MG tablet Take 1 tablet (5 mg total) by mouth every 8 (eight) hours as needed for Muscle spasms. 60 tablet 1    diclofenac (VOLTAREN) 75 MG EC tablet Take 1 tablet (75 mg total) by mouth 2 (two) times daily as needed (pain). 60 tablet 2    melatonin (MELATIN) 3 mg tablet Take 2 tablets (6 mg total) by mouth nightly as needed for Insomnia. 30 tablet 0    ondansetron (ZOFRAN-ODT) 4 MG TbDL Take 1 tablet (4 mg total) by mouth every 8 (eight) hours as needed (nausea). 15 tablet 1    traZODone (DESYREL) 100 MG tablet Take 1 tablet (100 mg total) by mouth every evening. 30 tablet 6    [DISCONTINUED] baclofen (LIORESAL) 10 MG tablet Take 1 tablet (10 mg total) by mouth 3 (three) times daily as needed (muscle pain or spasm). 60 tablet 2    [DISCONTINUED] ondansetron (ZOFRAN-ODT) 4 MG TbDL Oral for 3 Days      [DISCONTINUED] traZODone (DESYREL) 100 MG tablet Take 1 tablet (100 mg total) by mouth nightly as needed for Insomnia. 30 tablet 0     Facility-Administered Encounter Medications as of 12/19/2023   Medication Dose Route Frequency Provider  "Last Rate Last Admin    lactated ringers infusion   Intravenous Continuous Elzbieta Quesada MD 10 mL/hr at 08/05/22 0808 New Bag at 08/05/22 0808    LIDOcaine (PF) 10 mg/ml (1%) injection 10 mg  1 mL Intradermal Once Medeiros-Masha, Yajaira S, FNP        LIDOcaine (PF) 10 mg/ml (1%) injection 10 mg  1 mL Intradermal Once Medeiros-Masha, Yajaira S, FNP        LIDOcaine (PF) 40 mg/mL (4 %) injection 1 mL  1 mL Other 1 time in Clinic/HOD Mekhi Grossman MD        phenylephrine HCL 1% nasal spray 1 spray  1 spray Each Nostril 1 time in Clinic/HOD Mekhi Grossamn MD        sodium chloride 0.9% flush 10 mL  10 mL Intravenous PRN Anna Taylor MD        sodium chloride 0.9% flush 10 mL  10 mL Intravenous PRN Jose Johns MD        [DISCONTINUED] LIDOcaine (PF) 40 mg/mL (4 %) injection 2 mL  2 mL Other 1 time in Clinic/HOD Tony Pepe MD        [DISCONTINUED] phenylephrine HCL 1% nasal spray 1 spray  1 spray Each Nostril 1 time in Clinic/HOD Tony Pepe MD           Physical Exam:  /81 (BP Location: Right forearm, Patient Position: Sitting, BP Method: Medium (Automatic))   Pulse 87   Temp 98.6 °F (37 °C) (Oral)   Resp 16   Ht 5' 9" (1.753 m)   Wt 78.9 kg (174 lb)   BMI 25.70 kg/m²   General:  Well-developed well-nourished male in no acute distress.  Voice is normal.  Head and face:  Normocephalic.  No facial lesions.  No temporomandibular joint tenderness or click.  Ears:  Right ear-auricle is normally developed.  External auditory canal is clear.  Tympanic membrane is nonerythematous.  No middle ear effusion.  Left ear-auricle is normally developed.  External auditory canal is clear.  Tympanic membrane is nonerythematous.  No middle ear effusion.  Nose:  Nasal dorsum is unremarkable.  No significant septal deviation.  No significant intranasal congestion.  Secretions are clear.  Oral cavity and oropharynx:  Tongue and floor mouth are without lesions.  Mucosa is moist.  No pharyngeal erythema or " exudates.  No oropharyngeal masses.  No tonsillar hypertrophy.  Neck:  Supple without adenopathy or thyromegaly.  Trachea is in the midline.  Parotid and submandibular glands are normal to palpation without masses or tenderness.  Eyes:  Extraocular muscles intact.  No nystagmus.  No exophthalmos or enophthalmos.  Neurologic:  Alert and oriented.  Cranial nerves 2-12 are grossly normal.      Procedure note: Flexible laryngoscopy.  The nose was prepped with 1% phenyleprine nasal spray and tetracaine topically. The flexible fiberoptic laryngoscope was introduced into the right nostril and advanced. Examination of the nose showed the above mentioned findings. Examination of the nasopharynx showed no nasopharyngeal masses or Eustachian tube obstruction. Examination of the base of tongue showed no masses, lesions, or lingual tonsil hypertrophy. Laryngeal examination showed normal vocal cord mobility bilaterally.  Epiglottis and remaining supraglottic laryngeal structures as well as vocal cords are without lesions.  No apparent lesions subglottis area. Examination of the hypopharynx showed no masses or lesions.  No pooling of secretions in the piriform sinuses.          Pertinent Data:  CT neck 6/08/23  FINDINGS:  No definite measurable tongue mass on today's exam.  Slightly asymmetric thickening of the posterior tongue on the right greater than left with continued interval improvement.  Right level 2 cervical lymph node measures 7 mm on today's exam short axis image 52 series 2 compared to 1 cm on the prior.  Residual scattered lymph nodes small by imaging criteria.  Mucosal edema again seen along the region of the inferior pharynx, larynx went.  Superimposed malignancy at the level of the supraglottic trachea, distal hypopharyngeal region not excluded. Thyroid gland normal.  Vascular structures intact.  Mild treatment related subcutaneous adipose tissue edema, thickening anterior and lateral neck bilaterally.  Paraspinal  muscles normal. Bones grossly intact.  Visualized intracranial structures normal.  Paranasal sinuses grossly a normal.  Intraorbital contents in normal.  Impression:  Continued improvement in the tongue mass with only slight asymmetric thickening at the posterior tongue on the right.  No measurable mass.  Lymphadenopathy improved with the index node in the right level 2 region smaller in the interval.  No enlarged lymph nodes on today's exam.  Continued submucosal thickening at the pharynx and laryngeal level likely post treatment related changes with superimposed residual malignancy not excluded.      CT soft tissue neck (08/25/2023):    FINDINGS:  There are post treatment changes of the neck with residual mucosal edema.  There is no definite new or progressive suspicious nodularity.  There are no new or enlarging cervical lymph nodes.     The parotid glands, submandibular glands and thyroid gland are unremarkable.  The major vessels in the neck are patent.  There is no acute abnormality of the orbits or visualized brain parenchyma.  There is no destructive bone lesion.  There is trace scattered paranasal sinus mucosal thickening.  There are mild emphysematous changes in the lung apices.     Impression:     Post treatment changes of the neck without evidence of new or progressive metastatic disease.      Assessment/Plan:  Yaron Brennan is a 58 y.o. 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 CRT, finished 10/10/22.  Most recent CT (8/23) without evidence of disease. PET 1/11/23 without disease (in [facility] system), TSH wnl 7/20/23.        Plan:   Follow-up in 2 months for routine surveillance.    Mekhi Grossman M.D.            HEAD & NECK CANCER SURVEILLANCE   Radiation and/or Chemotherapy     Medical Oncologist: Kevon Welsh MD  Radiation Oncologist: Unknown    Primary tumor: cT3 cN2 M0 p16+ base of tongue    Date  of Diagnosis: 7/6/22  Therapy: Cisplatin 100 mg/m² IV days 1, 22, and 23, concurrent with RT  Completion Date: 10/10/22    Pertinent Treatment History:   First presentation 7/2022  CT neck 7/5/22 & 7/29/22  Panendoscopy 7/6/22  Right UE DVT 2/2 PICC 9/15/22 on Xarelto  CRT 8/25/22 - 10/10/22  Direct laryngoscopy with biopsy of benign laryngeal ductal cyst on October 2, 2023.      Place date if completed   3 6 12 18 24 36 48 60   CT Neck 1/31/23 6/8/23 8/25/23 X X X X X   PET/CT 1/11/23          CXR  7/17/23 X X X X X X   TFT 2/2/23 7/20/23 X  X X X X     Current Surveillance Interval:  1 year; every 6-8 weeks

## 2024-01-03 ENCOUNTER — OFFICE VISIT (OUTPATIENT)
Dept: INTERNAL MEDICINE | Facility: CLINIC | Age: 59
End: 2024-01-03
Payer: MEDICAID

## 2024-01-03 DIAGNOSIS — G47.09 OTHER INSOMNIA: ICD-10-CM

## 2024-01-03 DIAGNOSIS — M51.37 DDD (DEGENERATIVE DISC DISEASE), LUMBOSACRAL: Primary | Chronic | ICD-10-CM

## 2024-01-03 PROBLEM — M51.379 DDD (DEGENERATIVE DISC DISEASE), LUMBOSACRAL: Chronic | Status: ACTIVE | Noted: 2024-01-03

## 2024-01-03 PROCEDURE — 1160F RVW MEDS BY RX/DR IN RCRD: CPT | Mod: CPTII,95,, | Performed by: NURSE PRACTITIONER

## 2024-01-03 PROCEDURE — 1159F MED LIST DOCD IN RCRD: CPT | Mod: CPTII,95,, | Performed by: NURSE PRACTITIONER

## 2024-01-03 PROCEDURE — 99214 OFFICE O/P EST MOD 30 MIN: CPT | Mod: 95,,, | Performed by: NURSE PRACTITIONER

## 2024-01-03 RX ORDER — NABUMETONE 750 MG/1
750 TABLET, FILM COATED ORAL 2 TIMES DAILY PRN
Qty: 60 TABLET | Refills: 1 | Status: SHIPPED | OUTPATIENT
Start: 2024-01-03

## 2024-01-03 RX ORDER — PREDNISONE 20 MG/1
TABLET ORAL
Qty: 8 TABLET | Refills: 0 | Status: SHIPPED | OUTPATIENT
Start: 2024-01-03

## 2024-01-03 RX ORDER — TIZANIDINE 4 MG/1
4 TABLET ORAL EVERY 8 HOURS PRN
Qty: 60 TABLET | Refills: 1 | Status: SHIPPED | OUTPATIENT
Start: 2024-01-03

## 2024-01-03 NOTE — PROGRESS NOTES
The patient location is: at home  The chief complaint leading to consultation is: LBP f/u    Visit type: audiovisual    Face to Face time with patient: 10 mins  20 minutes of total time spent on the encounter, which includes face to face time and non-face to face time preparing to see the patient (eg, review of tests), Obtaining and/or reviewing separately obtained history, Documenting clinical information in the electronic or other health record, Independently interpreting results (not separately reported) and communicating results to the patient/family/caregiver, or Care coordination (not separately reported).         Each patient to whom he or she provides medical services by telemedicine is:  (1) informed of the relationship between the physician and patient and the respective role of any other health care provider with respect to management of the patient; and (2) notified that he or she may decline to receive medical services by telemedicine and may withdraw from such care at any time.    Billing Provider: SANDY Brandt  Level of Service: GA OFFICE/OUTPT VISIT, EST, LEVL IV, 30-39 MIN  Patient PCP Information       Provider PCP Type    SANDY Brandt General            Reason for Visit / Chief Complaint: Follow-up (Lower back pain- not improving, states meds that were prescribed (flexeril & Voltaren does not help at all))       History of Present Illness / Problem Focused Workflow     Yaron Brennan is a 58 y.o. Black or  male for LBP f/u. Medical problems include squamous cell carcinoma of the base of the tongue (dx'd 7/2022), RUE DVT (2/2 PICC line 9/15/22), HTN, DDD lumbosacral, ETOH and tobacco abuse (quit 7/2022). Followed by [facility] oncology and E[facility] clinics.     Today, pt reports continued intermittent LBP that sometimes radiates down both legs. Reports current meds ineffective. Pt completed PT evaluation and is scheduled for second session later today. Reports worsening  of symptoms after initial PT session. Reports worsening with prolonged standing/sitting and walking; somewhat improvement with rest. Reports med compliance. Denies chest pain, shortness of breath, cough, fever, headache, dizziness, weakness, abdominal pain, nausea, vomiting, diarrhea, constipation, dysuria, depression, anxiety, SI/HI.    Review of Systems     Review of Systems   Constitutional:  Negative for activity change and unexpected weight change.   HENT:  Negative for hearing loss, rhinorrhea and trouble swallowing.    Eyes:  Negative for discharge and visual disturbance.   Respiratory:  Negative for chest tightness and wheezing.    Cardiovascular:  Negative for chest pain and palpitations.   Gastrointestinal:  Negative for blood in stool, constipation, diarrhea and vomiting.   Endocrine: Negative for polydipsia and polyuria.   Genitourinary:  Negative for difficulty urinating, hematuria and urgency.   Musculoskeletal:  Positive for arthralgias. Negative for joint swelling and neck pain.   Neurological:  Negative for weakness and headaches.   Psychiatric/Behavioral:  Negative for confusion and dysphoric mood.         See HPI for details    Constitutional: Denies Change in appetite. Denies Chills. Denies Fever. Denies Night sweats.  Eye: Denies Blurred vision. Denies Discharge. Denies Eye pain.  ENT: Denies Decreased hearing. Denies Sore throat. Denies Swollen glands.  Respiratory: Denies Cough. Denies Shortness of breath. Denies Shortness of breath with exertion. Denies Wheezing.  Cardiovascular: DeniesChest pain at rest. Denies Chest pain with exertion. Denies Irregular heartbeat. Denies Palpitations. Denies Edema.  Gastrointestinal: Denies Abdominal pain. Denies Diarrhea. Denies Nausea. Denies Vomiting. Denies Hematemesis or Hematochezia.  Genitourinary: Denies Dysuria. Denies Urinary frequency. Denies Urinary urgency. Denies Blood in urine.  Endocrine: Denies Cold intolerance. Denies Excessive thirst.  Denies Heat intolerance. Denies Weight loss. Denies Weight gain.  Musculoskeletal: Admits Painful joints. Denies Weakness.  Integumentary: Denies Rash. Denies Itching. Denies Dry skin.  Neurologic: Denies Dizziness. Denies Fainting. Denies Headache.  Psychiatric: Denies Depression. Denies Anxiety. Denies Suicidal/Homicidal ideations.    All Other ROS: Negative except as stated in HPI.     Medical / Social / Family History     ----------------------------  Cancer  Hypertension     Past Surgical History:   Procedure Laterality Date    BACK SURGERY      COLONOSCOPY  10/11/2023    DIRECT LARYNGOSCOPY N/A 2022    Procedure: LARYNGOSCOPY, DIRECT;  Surgeon: Mekhi Grossman MD;  Location: [facility];  Service: ENT;  Laterality: N/A;    DIRECT LARYNGOSCOPY N/A 10/02/2023    Procedure: LARYNGOSCOPY, DIRECT;  Surgeon: Mekhi Grossman MD;  Location: [facility];  Service: ENT;  Laterality: N/A;    EXTRACTION OF TOOTH N/A 2022    Procedure: EXTRACTION, TEETH;  Surgeon: Mekhi Grossman MD;  Location: [facility];  Service: ENT;  Laterality: N/A;    SPINE SURGERY      Not sure of date maybe in        Social History     Socioeconomic History    Marital status:      Spouse name: Blanca   Occupational History    Occupation: Disabled   Tobacco Use    Smoking status: Former     Current packs/day: 0.00     Types: Cigarettes     Start date: 1982     Quit date: 2022     Years since quittin.4    Smokeless tobacco: Never    Tobacco comments:     Been almost 35 yr   Substance and Sexual Activity    Alcohol use: Not Currently    Drug use: Yes     Types: Marijuana     Comment: Daily    Sexual activity: Yes     Partners: Female     Birth control/protection: None     Social Determinants of Health     Financial Resource Strain: High Risk (2024)    Overall Financial Resource Strain (CARDIA)     Difficulty of Paying Living Expenses: Very hard   Food Insecurity: No Food Insecurity (2024)    Hunger Vital  Sign     Worried About Running Out of Food in the Last Year: Never true     Ran Out of Food in the Last Year: Never true   Transportation Needs: No Transportation Needs (1/1/2024)    PRAPARE - Transportation     Lack of Transportation (Medical): No     Lack of Transportation (Non-Medical): No   Physical Activity: Insufficiently Active (1/1/2024)    Exercise Vital Sign     Days of Exercise per Week: 4 days     Minutes of Exercise per Session: 30 min   Stress: No Stress Concern Present (1/1/2024)    [employer] [facility] [employer] - Occupational Stress Questionnaire     Feeling of Stress : Only a little   Social Connections: Unknown (1/1/2024)    Social Connection and Isolation Panel [NHANES]     Frequency of Communication with Friends and Family: More than three times a week     Frequency of Social Gatherings with Friends and Family: Twice a week     Active Member of Clubs or Organizations: Yes     Attends Club or Organization Meetings: More than 4 times per year     Marital Status:    Housing Stability: Patient Declined (1/1/2024)    Housing Stability Vital Sign     Unable to Pay for Housing in the Last Year: Patient declined     Unstable Housing in the Last Year: Patient declined        Family History   Problem Relation Age of Onset    Hypertension Mother     Cancer Father     Hypertension Sister     Hypertension Brother         Medications and Allergies     Medications  Current Outpatient Medications   Medication Instructions    amLODIPine (NORVASC) 10 mg, Oral, Daily    melatonin (MELATIN) 6 mg, Oral, Nightly PRN    nabumetone (RELAFEN) 750 mg, Oral, 2 times daily PRN    ondansetron (ZOFRAN-ODT) 4 mg, Oral, Every 8 hours PRN    predniSONE (DELTASONE) 20 MG tablet One tablet orally twice daily for 3 days and then once daily for 2 days    tiZANidine (ZANAFLEX) 4 mg, Oral, Every 8 hours PRN    traZODone (DESYREL) 100 mg, Oral, Nightly         Allergies  Review of patient's allergies indicates:  No  Known Allergies    Physical Examination   Vital Signs  Pain Score:   8  Pain Loc: Back]    Physical Exam  Neurological:      Mental Status: He is alert and oriented to person, place, and time.   Psychiatric:         Mood and Affect: Mood normal.         Speech: Speech normal.         Behavior: Behavior normal. Behavior is cooperative.         Thought Content: Thought content normal.         Judgment: Judgment normal.           Results     Lab Results   Component Value Date    WBC 5.78 12/01/2023    HGB 13.3 (L) 12/01/2023    HCT 40.8 (L) 12/01/2023     12/01/2023    CHOL 203 (H) 12/01/2023    TRIG 66 12/01/2023    ALT 7 12/01/2023    AST 13 12/01/2023     12/01/2023    K 3.9 12/01/2023    CREATININE 1.29 (H) 12/01/2023    BUN 12.6 12/01/2023    CO2 29 12/01/2023    TSH 1.589 07/20/2023    PSA 3.01 12/01/2023    HGBA1C 5.4 12/01/2023     X-Ray Lumbar Spine 2 Or 3 Views  Order: [id number]  Status: Final result       Visible to patient: Yes (seen)       Next appt: 01/19/2024 at 09:00 AM in Hematology and Oncology (NURSE, [facility] HEMATOLOGY ONCOLOGY)       Dx: Acute midline low back pain without s...    0 Result Notes       1 Patient Communication  Details    Reading Physician Reading Date Result Priority   Trip De La Cruz MD  [phone] 12/1/2023 Routine     Narrative & Impression  EXAMINATION:  XR LUMBAR SPINE 2 OR 3 VIEWS     CLINICAL HISTORY:  Low back pain, unspecified     COMPARISON:  None     FINDINGS:  There are degenerative changes most pronounced at L4-5 and L5-S1.  The alignment is within normal limits no fractures are seen.     Impression:     Degenerative changes most pronounced at L4-5 and L5-S1.        Electronically signed by: Trip De La Cruz MD  Date:                                            12/01/2023  Time:                                           13:31     Assessment and Plan (including Health Maintenance)     Plan:     1. DDD (degenerative disc disease), lumbosacral  D/c diclofenac and  flexeril.  Rx relafen and zanaflex and prednisone taper.  Complete PT as scheduled; approved for 8 sessions.  Will order MRI once PT is completed if no improvement.   Perform back stretches daily.   Avoid activities than increase back pain or stiffness.   Apply heating pad, ice pack, and BioFreeze as needed; alternate every 15-20 minutes.   Massage back to loosen muscles.   Continue tylenol arthritis/NSAID/muscle relaxer as needed.    - tiZANidine (ZANAFLEX) 4 MG tablet; Take 1 tablet (4 mg total) by mouth every 8 (eight) hours as needed (muscle pain or spasm).  Dispense: 60 tablet; Refill: 1  - nabumetone (RELAFEN) 750 MG tablet; Take 1 tablet (750 mg total) by mouth 2 (two) times daily as needed for Pain.  Dispense: 60 tablet; Refill: 1      Problem List Items Addressed This Visit          Neuro    DDD (degenerative disc disease), lumbosacral - Primary (Chronic)    Relevant Medications    tiZANidine (ZANAFLEX) 4 MG tablet    nabumetone (RELAFEN) 750 MG tablet     Other Visit Diagnoses       Other insomnia                  Health Maintenance Due   Topic Date Due    COVID-19 Vaccine (1) Never done       Follow up in about 2 months (around 3/3/2024) for Follow up LBP.        Signature:  SANDY Brandt  [facility]  [address] [id number]      Date of encounter: 1/3/24

## 2024-02-05 DIAGNOSIS — C01 SQUAMOUS CELL CARCINOMA OF BASE OF TONGUE: Primary | ICD-10-CM

## 2024-02-07 ENCOUNTER — OFFICE VISIT (OUTPATIENT)
Dept: OTOLARYNGOLOGY | Facility: CLINIC | Age: 59
End: 2024-02-07
Payer: MEDICAID

## 2024-02-07 VITALS
HEIGHT: 71 IN | HEART RATE: 71 BPM | WEIGHT: 174 LBS | BODY MASS INDEX: 24.36 KG/M2 | SYSTOLIC BLOOD PRESSURE: 137 MMHG | DIASTOLIC BLOOD PRESSURE: 93 MMHG | RESPIRATION RATE: 18 BRPM | TEMPERATURE: 99 F

## 2024-02-07 DIAGNOSIS — Z92.3 HISTORY OF RADIATION TO HEAD AND NECK REGION: ICD-10-CM

## 2024-02-07 DIAGNOSIS — C01 SQUAMOUS CELL CARCINOMA OF BASE OF TONGUE: Primary | ICD-10-CM

## 2024-02-07 PROCEDURE — 31575 DIAGNOSTIC LARYNGOSCOPY: CPT | Mod: PBBFAC | Performed by: OTOLARYNGOLOGY

## 2024-02-07 PROCEDURE — 99214 OFFICE O/P EST MOD 30 MIN: CPT | Mod: PBBFAC | Performed by: OTOLARYNGOLOGY

## 2024-02-07 RX ORDER — LIDOCAINE HYDROCHLORIDE 40 MG/ML
1 INJECTION, SOLUTION RETROBULBAR
Status: SHIPPED | OUTPATIENT
Start: 2024-02-07

## 2024-02-07 NOTE — PROGRESS NOTES
The scope used for the exam was:  Flexible scope ENF-P4  Serial Number:  1)    [id number]    []   2)    [id number]    []   3)    [id number]    []   4)    [id number]    []   5)    [id number]    [x]   6)    [id number]    []       The scope used for the exam was:  Rigid scope   Serial Number:  1)   [id number]    []   2)   [id number]    []   3)   7330    []   4)    3384   []   5)    0824   []   6)    5554   []     7)   7425    []   8)   2240    []   9)   1109    []        Initiate Treatment: clindamycin 1 % lotion Bid- Sig: Apply thin layer to face twice daily. Plan: Discussed on on clindamycin and if soolantra. Pt is going on a trip and will keep on the course of doxycycline.\\n\\nRtc has needed. Detail Level: Zone Continue Regimen: Doxycycline- take one pill twice daily (take with a probiotic x 1 month)

## 2024-02-08 NOTE — PROGRESS NOTES
[facility]  Otolaryngology Clinic Note    Yaron Brennan  Encounter Date: 2/7/2024  YOB: 1965  Physician: Mekhi Grossman    Chief Complaint: Otalgia, neck mass    HPI: Yaron Brennan is a 58 y.o. male with HTN who presents as referral from Dr. Welsh for squamous cell carcinoma of base of tongue. Patient reports that toward the beginning of the year he started to have worsening left otalgia. He then noticed a bump/swelling on left neck that he thought was was due to a bug bite. This got progressively bigger over time and he then noticed some swelling on right neck as well. He eventually presented to [facility] in early July 2022. He had a CT neck that showed an ulcerated mass in the tongue base, and underwent direct laryngoscopy with ENT on 7/6/22 (Dr. Tay). Operative findings: ulcerated friable mass base of the tongue that seems confined to base of tongue; lingual surface of epiglottis is free from tumor; mass involves bilateral base of tongue, significantly midline. Results returned positive for p16+ SCCa. Today in clinic patient reports bilateral L>R otalgia, mild dysphagia/odynophagia. He reports he eats basically whatever he wants without overt signs of aspiration. He does report 40lb weight loss in the last 6 months. + Neck LAD. Reports voice changes. Denies any issues breathing. Smoked 1ppd since 17 years old (42 years) but quit a month ago when he got his diagnosis. Also previously drank 12 pack of beer a day and likewise quite a month ago. Referred to ENT for consideration of possible surgical resection.     07/05/2022: CT soft tissue of the neck without contrast:  -ulcerated tongue base mass, at least 3.4 cm by 3.0 cm x 3.1 cm; associated bulky level 2 and level 3 cervical lymphadenopathy; suspected level 1 and level 4 cervical lymphadenopathy; a right level 2 lymph node with central necrosis 2.6 x 3.3 cm; a left level 3 lymph node 4.5 x 3.2 cm; no  significant airway compromise; no acute or aggressive bony lesions  -malignant ulcerated type mass with lymph node metastasis; no airway compromise; right upper lung lobe indistinct ground-glass pulmonary nodules typical of benign infectious or inflammatory etiology    8/16/22: Here today for follow up. Had dental extractions performed on 8/5. Not having any issues. Taking in 4-5 ensures daily. Trying to take some other soft foods like grits and pudding.     9/22/22: Here for follow up. Patient is undergoing CRT and tolerating it well. He reports he has one more chemotherapy session left and about 2-3 weeks of radiation left. Overall maintaining his weight. No progressive dysphagia, swallowing fine. Also no breathing issues or other complaints.    11/3/22: Patient is here today for follow up.  He finished CRT on 10/10.  Patient states he is still in some pain in  his neck and inside of his mouth.   He is tolerating p.o. intake but states at times he does have difficulty swallowing.  He does have xerostomia.  Patient has not used his eardrops to loosen the cerumen impaction.  Denies any shortness of breath or difficulty breathing/ changes to voice,  changes to his neck masses.    12/7/22:  Patient returns for surveillance today.  Patient denies any new dysphagia, odynophagia, H&N lumps or bumps, otalgia, weight loss.  Overall pain is getting better, tolerating diet, gaining weight.    03/14/2023 doing well, no pain, no otalgia, dysphagia has improved significantly, weight is stable.  No hemoptysis.  Voice remains hoarse.  No airway distress.    4/11/2023: Returns today for follow-up. Reports doing well overall. No dysphagia, swallowing all foods without issue. Voice stable. No odynophagia, no SOB, no otalgia. Does note feeling like ears are clogged, has tried ear drops without success. Did get TSH since last visit, wnl.    5/18/23: Patient here today for follow up. Doing well since last visit. No major concerns.  Denies weight loss, new lumps/bumps, sore throat, dysphagia, odynophagia, or otalgia. Having some pain when chewing. Does not have dentures.     6/13/23: Returns today for follow-up/surveillance. Patient reports doing well overall. No issues with dysphagia, odynophagia, voice changes, otalgia, or weight loss. Still looking for dentist to get dentures made. Did get CT neck since last visit, but CXR and TSH still pending.     7/19/23: Here for surveillance. He is doing well. He is swallowing food of all consistencies without issue - no coughing or choking. He denies voice changes, weight loss, pain, or new lumps/bumps. He is breathing comfortably and active. Having trouble with sleep at night.    8/16/23: He has been doing very well since his last visit no issues at all.  He has eating a regular diet and no issues swallowing.  No voice changes lumps or bumps in her neck.    9/19/23:  Here today for follow-up after CT.  No issues in the interim.  Dysphagia and odynophagia are stable: Able to eat most things except for tough meat.  Weight has been stable.  No noted lumps/bumps.    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  Patient has done well postoperatively.  He still has a mild sore throat but this is improving.  He has no respiratory distress or dysphagia.  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  There was no evidence of malignancy.  Results were discussed with the patient.  Patient has no new problems today.    December 19, 2023:   Patient presents today for routine surveillance.  Patient is doing well.  He is tolerating his diet.  His only complaint is that of some xerostomia but this is not a significant problem.  He has no complaints of sore throat, ear pain, swelling in the neck, or difficulty  swallowing.  He has no other new problems today.    2024:   Patient presents today for routine follow-up.  Patient is doing well.  He had been seen by Dr. Leavitt, radiation Oncology, last week who found the patient to be without evident disease.  Patient continues to do well.  He has no complaints of sore throat, ear pain, difficulty swallowing, or swelling in the neck.  No hemoptysis.  Appetite has been good.  He has no other new problems today.    Review of patient's allergies indicates:  No Known Allergies    Past Medical History:   Diagnosis Date    Cancer     Hypertension        Past Surgical History:   Procedure Laterality Date    BACK SURGERY      COLONOSCOPY  10/11/2023    DIRECT LARYNGOSCOPY N/A 2022    Procedure: LARYNGOSCOPY, DIRECT;  Surgeon: Mekhi Grossman MD;  Location: [facility];  Service: ENT;  Laterality: N/A;    DIRECT LARYNGOSCOPY N/A 10/02/2023    Procedure: LARYNGOSCOPY, DIRECT;  Surgeon: Mekhi Grossman MD;  Location: [facility] OR;  Service: ENT;  Laterality: N/A;    EXTRACTION OF TOOTH N/A 2022    Procedure: EXTRACTION, TEETH;  Surgeon: Mekhi Grossman MD;  Location: [facility];  Service: ENT;  Laterality: N/A;    SPINE SURGERY      Not sure of date maybe in        Social History     Socioeconomic History    Marital status:      Spouse name: Blanca   Occupational History    Occupation: Disabled   Tobacco Use    Smoking status: Former     Current packs/day: 0.00     Types: Cigarettes     Start date: 1982     Quit date: 2022     Years since quittin.5    Smokeless tobacco: Never    Tobacco comments:     Been almost 35 yr   Substance and Sexual Activity    Alcohol use: Not Currently    Drug use: Yes     Types: Marijuana     Comment: Daily    Sexual activity: Yes     Partners: Female     Birth control/protection: None     Social Determinants of Health     Financial Resource Strain: High Risk (2024)    Overall Financial Resource Strain (CARDIA)      Difficulty of Paying Living Expenses: Very hard   Food Insecurity: No Food Insecurity (1/1/2024)    Hunger Vital Sign     Worried About Running Out of Food in the Last Year: Never true     Ran Out of Food in the Last Year: Never true   Transportation Needs: No Transportation Needs (1/1/2024)    PRAPARE - Transportation     Lack of Transportation (Medical): No     Lack of Transportation (Non-Medical): No   Physical Activity: Insufficiently Active (1/1/2024)    Exercise Vital Sign     Days of Exercise per Week: 4 days     Minutes of Exercise per Session: 30 min   Stress: No Stress Concern Present (1/1/2024)    [employer] - Occupational Stress Questionnaire     Feeling of Stress : Only a little   Social Connections: Unknown (1/1/2024)    Social Connection and Isolation Panel [NHANES]     Frequency of Communication with Friends and Family: More than three times a week     Frequency of Social Gatherings with Friends and Family: Twice a week     Active Member of Clubs or Organizations: Yes     Attends Club or Organization Meetings: More than 4 times per year     Marital Status:    Housing Stability: Patient Declined (1/1/2024)    Housing Stability Vital Sign     Unable to Pay for Housing in the Last Year: Patient declined     Unstable Housing in the Last Year: Patient declined       Family History   Problem Relation Age of Onset    Hypertension Mother     Cancer Father     Hypertension Sister     Hypertension Brother        Outpatient Encounter Medications as of 2/7/2024   Medication Sig Dispense Refill    amLODIPine (NORVASC) 10 MG tablet Take 1 tablet (10 mg total) by mouth once daily. 90 tablet 3    melatonin (MELATIN) 3 mg tablet Take 2 tablets (6 mg total) by mouth nightly as needed for Insomnia. 30 tablet 0    ondansetron (ZOFRAN-ODT) 4 MG TbDL Take 1 tablet (4 mg total) by mouth every 8 (eight) hours as needed (nausea). 15 tablet 1    predniSONE (DELTASONE) 20 MG tablet One  "tablet orally twice daily for 3 days and then once daily for 2 days 8 tablet 0    tiZANidine (ZANAFLEX) 4 MG tablet Take 1 tablet (4 mg total) by mouth every 8 (eight) hours as needed (muscle pain or spasm). 60 tablet 1    traZODone (DESYREL) 100 MG tablet Take 1 tablet (100 mg total) by mouth every evening. 30 tablet 6    nabumetone (RELAFEN) 750 MG tablet Take 1 tablet (750 mg total) by mouth 2 (two) times daily as needed for Pain. (Patient not taking: Reported on 2/7/2024) 60 tablet 1     Facility-Administered Encounter Medications as of 2/7/2024   Medication Dose Route Frequency Provider Last Rate Last Admin    lactated ringers infusion   Intravenous Continuous Elzbieta Quesada MD 10 mL/hr at 08/05/22 0808 New Bag at 08/05/22 0808    LIDOcaine (PF) 10 mg/ml (1%) injection 10 mg  1 mL Intradermal Once Yajaira Watts FNP        LIDOcaine (PF) 10 mg/ml (1%) injection 10 mg  1 mL Intradermal Once Yajaira Watts FNP        LIDOcaine (PF) 40 mg/mL (4 %) injection 1 mL  1 mL Other 1 time in Clinic/HOD Mekhi Grossman MD        phenylephrine HCL 1% nasal spray 1 spray  1 spray Each Nostril 1 time in Clinic/HOD Mekhi Grossman MD        sodium chloride 0.9% flush 10 mL  10 mL Intravenous PRN Anna Taylor MD        sodium chloride 0.9% flush 10 mL  10 mL Intravenous PRN Jose Johns MD        [DISCONTINUED] LIDOcaine (PF) 40 mg/mL (4 %) injection 1 mL  1 mL Other 1 time in Clinic/HOD Mekhi Grossman MD        [DISCONTINUED] phenylephrine HCL 1% nasal spray 1 spray  1 spray Each Nostril 1 time in Clinic/HOD Mekhi Grossman MD           Physical Exam:  BP (!) 137/93 (BP Location: Left arm, Patient Position: Sitting, BP Method: Large (Automatic))   Pulse 71   Temp 98.7 °F (37.1 °C)   Resp 18   Ht 5' 11" (1.803 m)   Wt 78.9 kg (174 lb)   BMI 24.27 kg/m²   General:  Well-developed well-nourished male in no acute distress.  Voice is normal.  Head and face:  Normocephalic.  No facial " lesions.  No temporomandibular joint tenderness or click.  Ears:  Right ear-auricle is normally developed.  External auditory canal is clear.  Tympanic membrane is nonerythematous.  No middle ear effusion.  Left ear-auricle is normally developed.  External auditory canal is clear.  Tympanic membrane is nonerythematous.  No middle ear effusion.  Nose:  Nasal dorsum is unremarkable.  No significant septal deviation.  No significant intranasal congestion.  Secretions are clear.  Oral cavity and oropharynx:  Tongue and floor mouth are without lesions.  Mucosa is moist.  No pharyngeal erythema or exudates.  No oropharyngeal masses.  No tonsillar hypertrophy.  Neck:  Supple without adenopathy or thyromegaly.  Trachea is in the midline.  Parotid and submandibular glands are normal to palpation without masses or tenderness.  Eyes:  Extraocular muscles intact.  No nystagmus.  No exophthalmos or enophthalmos.  Neurologic:  Alert and oriented.  Cranial nerves 2-12 are grossly normal.      Procedure note: Flexible laryngoscopy.  The nose was prepped with 1% phenyleprine nasal spray and tetracaine topically. The flexible fiberoptic laryngoscope was introduced into the right nostril and advanced. Examination of the nose showed the above mentioned findings. Examination of the nasopharynx showed no nasopharyngeal masses or Eustachian tube obstruction. Examination of the base of tongue showed no masses, lesions, or lingual tonsil hypertrophy. Laryngeal examination showed normal vocal cord mobility bilaterally.  Epiglottis and remaining supraglottic laryngeal structures as well as vocal cords are without lesions.  No apparent lesions subglottis area. Examination of the hypopharynx showed no masses or lesions.  No pooling of secretions in the piriform sinuses.          Pertinent Data:  CT neck 6/08/23  FINDINGS:  No definite measurable tongue mass on today's exam.  Slightly asymmetric thickening of the posterior tongue on the right  greater than left with continued interval improvement.  Right level 2 cervical lymph node measures 7 mm on today's exam short axis image 52 series 2 compared to 1 cm on the prior.  Residual scattered lymph nodes small by imaging criteria.  Mucosal edema again seen along the region of the inferior pharynx, larynx went.  Superimposed malignancy at the level of the supraglottic trachea, distal hypopharyngeal region not excluded. Thyroid gland normal.  Vascular structures intact.  Mild treatment related subcutaneous adipose tissue edema, thickening anterior and lateral neck bilaterally.  Paraspinal muscles normal. Bones grossly intact.  Visualized intracranial structures normal.  Paranasal sinuses grossly a normal.  Intraorbital contents in normal.  Impression:  Continued improvement in the tongue mass with only slight asymmetric thickening at the posterior tongue on the right.  No measurable mass.  Lymphadenopathy improved with the index node in the right level 2 region smaller in the interval.  No enlarged lymph nodes on today's exam.  Continued submucosal thickening at the pharynx and laryngeal level likely post treatment related changes with superimposed residual malignancy not excluded.      CT soft tissue neck (08/25/2023):    FINDINGS:  There are post treatment changes of the neck with residual mucosal edema.  There is no definite new or progressive suspicious nodularity.  There are no new or enlarging cervical lymph nodes.     The parotid glands, submandibular glands and thyroid gland are unremarkable.  The major vessels in the neck are patent.  There is no acute abnormality of the orbits or visualized brain parenchyma.  There is no destructive bone lesion.  There is trace scattered paranasal sinus mucosal thickening.  There are mild emphysematous changes in the lung apices.     Impression:     Post treatment changes of the neck without evidence of new or progressive metastatic  disease.      Assessment/Plan:  Yaron Brennan is a 58 y.o. 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 CRT, finished 10/10/22.  Most recent CT (8/23) without evidence of disease. PET 1/11/23 without disease (in [facility] system), TSH wnl 7/20/23.        Plan:   Follow-up in 2 months for routine surveillance.  We will obtain preclinic CT scan of the neck and chest x-ray.    Mekhi Grossman M.D.            HEAD & NECK CANCER SURVEILLANCE   Radiation and/or Chemotherapy     Medical Oncologist: Kevon Welsh MD  Radiation Oncologist: Unknown    Primary tumor: cT3 cN2 M0 p16+ base of tongue    Date of Diagnosis: 7/6/22  Therapy: Cisplatin 100 mg/m² IV days 1, 22, and 23, concurrent with RT  Completion Date: 10/10/22    Pertinent Treatment History:   First presentation 7/2022  CT neck 7/5/22 & 7/29/22  Panendoscopy 7/6/22  Right UE DVT 2/2 PICC 9/15/22 on Xarelto  CRT 8/25/22 - 10/10/22  Direct laryngoscopy with biopsy of benign laryngeal ductal cyst on October 2, 2023.      Place date if completed   3 6 12 18 24 36 48 60   CT Neck 1/31/23 6/8/23 8/25/23 X X X X X   PET/CT 1/11/23          CXR  7/17/23 X X X X X X   TFT 2/2/23 7/20/23 X  X X X X     Current Surveillance Interval:  1 year; every 6-8 weeks

## 2024-02-21 ENCOUNTER — OFFICE VISIT (OUTPATIENT)
Dept: HEMATOLOGY/ONCOLOGY | Facility: CLINIC | Age: 59
End: 2024-02-21
Payer: MEDICAID

## 2024-02-21 VITALS
HEIGHT: 71 IN | SYSTOLIC BLOOD PRESSURE: 133 MMHG | OXYGEN SATURATION: 100 % | HEART RATE: 71 BPM | DIASTOLIC BLOOD PRESSURE: 80 MMHG | BODY MASS INDEX: 23.99 KG/M2 | TEMPERATURE: 98 F | WEIGHT: 171.38 LBS | RESPIRATION RATE: 20 BRPM

## 2024-02-21 DIAGNOSIS — Z92.3 HISTORY OF RADIATION THERAPY: ICD-10-CM

## 2024-02-21 DIAGNOSIS — C01 SQUAMOUS CELL CARCINOMA OF BASE OF TONGUE: Primary | ICD-10-CM

## 2024-02-21 PROCEDURE — 99214 OFFICE O/P EST MOD 30 MIN: CPT | Mod: PBBFAC | Performed by: NURSE PRACTITIONER

## 2024-02-21 PROCEDURE — 1160F RVW MEDS BY RX/DR IN RCRD: CPT | Mod: CPTII,,, | Performed by: NURSE PRACTITIONER

## 2024-02-21 PROCEDURE — 3008F BODY MASS INDEX DOCD: CPT | Mod: CPTII,,, | Performed by: NURSE PRACTITIONER

## 2024-02-21 PROCEDURE — 3075F SYST BP GE 130 - 139MM HG: CPT | Mod: CPTII,,, | Performed by: NURSE PRACTITIONER

## 2024-02-21 PROCEDURE — 99213 OFFICE O/P EST LOW 20 MIN: CPT | Mod: S$PBB,,, | Performed by: NURSE PRACTITIONER

## 2024-02-21 PROCEDURE — 1159F MED LIST DOCD IN RCRD: CPT | Mod: CPTII,,, | Performed by: NURSE PRACTITIONER

## 2024-02-21 PROCEDURE — 3079F DIAST BP 80-89 MM HG: CPT | Mod: CPTII,,, | Performed by: NURSE PRACTITIONER

## 2024-02-21 NOTE — PROGRESS NOTES
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 Intermittent alcohol use.  Procedure/surgical history: Back surgery (20-30 years ago, in use 10, apparently involving L5 fusion for prolapse this from work-related injury).  Social history:  Has been smoking a pack of cigarettes daily since age 17 years.  Has been drinking 6 beers daily for last 40 years.  Lately, trying to quit smoking and drinking.  Smokes marijuana.  .  Has 2 biological children of his own.  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  Lives in [place], [place]..  Family history:  Father  from some kind of cancer at age 72 years.  Health maintenance:  Does not have a regular primary care provider.  Does not visit with doctors.  No screening colonoscopy ever.     History of Present illness:  57-year-old gentleman   He was admitted to [facility] 2022 with 2 months history of progressive , 20 lb weight loss and bilateral neck masses.  Also, hoarseness.  CT neck showed ulcerated mass in the tongue base, malignant-appearing, along with lymph node metastasis.  ENT performed a laryngoscopy with biopsy of tongue mass.  Friable base of tongue mass.  No airway compromise.  Right upper lung lobe indistinct, ground-glass lung nodules, typical of a benign inflammatory etiology.  Discharged 2022.  Patient reported weight loss of> 20 lb in previous 2 months.    Interval History 2024: Patient presents alone for scheduled  squamous cell carcinoma of base of tongue surveillance visit.  patient reports doing well without any new or worsening symptoms.  Patient says his only complaint which is unrelated to his tongue cancer diagnosis is back pain.  Patient admits that he does not  have any issues with choking or aspiration however does have to consume liquids when eating solid foods to help food go down.  Patient also admits that since he has no more teeth he occasionally bites down on gums which become inflamed and painful.  Lab work reviewed with the patient,   Slightly elevated serum creatinine level,   Otherwise stable lab work.  Patient reports good appetite and energy level.  He reports compliance with regular ENT follow-up visits.  He is aware of upcoming CT scan of which he will then follow up with ENT a few days following.  Discussed plan of care and follow-up appointments with the patient.      Review of Systems   HENT:          Sore gums without blisters or ulcers, dysphagia sometimes   Respiratory:  Negative for cough and shortness of breath.    Cardiovascular:  Negative for chest pain.   Gastrointestinal:  Negative for abdominal pain and diarrhea.   Genitourinary:  Negative for frequency.   Musculoskeletal:  Positive for back pain.   Skin:  Negative for rash.   Neurological:  Negative for headaches.   Psychiatric/Behavioral:  The patient is not nervous/anxious.      Physical Exam  Constitutional:       Appearance: Normal appearance.   HENT:      Head: Normocephalic.      Mouth/Throat:      Mouth: Mucous membranes are moist.      Comments: edentulous  Eyes:      Pupils: Pupils are equal, round, and reactive to light.   Cardiovascular:      Rate and Rhythm: Normal rate and regular rhythm.      Pulses: Normal pulses.      Heart sounds: Normal heart sounds.   Pulmonary:      Effort: Pulmonary effort is normal.      Breath sounds: Normal breath sounds.   Abdominal:      General: Bowel sounds are normal.      Palpations: Abdomen is soft.   Musculoskeletal:         General: Normal range of motion.      Cervical back: Normal range of motion.   Skin:     General: Skin is warm and dry.      Capillary Refill: Capillary refill takes less than 2 seconds.   Neurological:      General: No focal  deficit present.      Mental Status: He is alert and oriented to person, place, and time.   Psychiatric:         Attention and Perception: Attention normal.         Mood and Affect: Affect normal.         Speech: Speech normal.         Behavior: Behavior normal.         Thought Content: Thought content normal.       Vitals:    02/21/24 1058   BP: 133/80   Pulse: 71   Resp: 20   Temp: 97.8 °F (36.6 °C)       Lab Results   Component Value Date    WBC 5.51 02/21/2024    RBC 4.52 (L) 02/21/2024    HGB 14.1 02/21/2024    HCT 40.8 (L) 02/21/2024    MCV 90.3 02/21/2024    MCH 31.2 (H) 02/21/2024    MCHC 34.6 02/21/2024    RDW 13.8 02/21/2024     02/21/2024    MPV 8.8 02/21/2024     CMP  Sodium Level   Date Value Ref Range Status   02/21/2024 139 136 - 145 mmol/L Final     Potassium Level   Date Value Ref Range Status   02/21/2024 3.7 3.5 - 5.1 mmol/L Final     Carbon Dioxide   Date Value Ref Range Status   02/21/2024 27 22 - 29 mmol/L Final     Blood Urea Nitrogen   Date Value Ref Range Status   02/21/2024 12.6 8.4 - 25.7 mg/dL Final     Creatinine   Date Value Ref Range Status   02/21/2024 1.26 (H) 0.73 - 1.18 mg/dL Final     Calcium Level Total   Date Value Ref Range Status   02/21/2024 9.0 8.4 - 10.2 mg/dL Final     Albumin Level   Date Value Ref Range Status   02/21/2024 4.1 3.5 - 5.0 g/dL Final     Bilirubin Total   Date Value Ref Range Status   02/21/2024 0.5 <=1.5 mg/dL Final     Alkaline Phosphatase   Date Value Ref Range Status   02/21/2024 83 40 - 150 unit/L Final     Aspartate Aminotransferase   Date Value Ref Range Status   02/21/2024 22 5 - 34 unit/L Final     Alanine Aminotransferase   Date Value Ref Range Status   02/21/2024 14 0 - 55 unit/L Final     eGFR   Date Value Ref Range Status   02/21/2024 >60 mls/min/1.73/m2 Final       Assessment:  # 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, p16 +:  -presentation: 07/2022:  Dysphagia, odynophagia, 20 lb weight loss, bilateral neck masses, hoarseness  -CT  soft tissues of the neck without contrast 07/05/2022, 07/29/2022  -S/P panendoscopy 07/06/2022  -ulcerated tongue base mass, at least 5 cm  -ulcerated friable mass base of the tongue, confined base of tongue on panendoscopy  -right level 2 lymph node 3.2 x 2.9 cm on CT  -left level 3 lymph node 5.1 x 4.2 cm on CT  -invasive squamous cell carcinoma, grade 3 of 4; p16 +  -no intrathoracic metastasis on CT chest  >>cT3 cN2 M0, clinical stage II     # Extensive DVT right upper extremity, including subclavian vein, axillary vein, and basilic vein   - on 09/15/2022, secondary to PICC line  -right-sided PICC line removed; MediPort was unable to place; chemotherapy is being continued with PICC line on the left side   -on Xarelto  -anticoagulation to continue for at least 3 months (until the middle/end of December ' 22)  (He stopped Xarelto 12/15/2022, appropriately so)    # History of tobacco abuse since age 17  # History of alcohol use  -strongly advised on cessation of both the above       Plan:  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 2/29/24  We will follow him peripherally  Follow-up w/  Dr. Welsh mid March with  CT scan results and lab work ( CBC, CMP, TSH, Free T4)        -11/21/2022: Cologuard +  Refer to GI for colonoscopy-pending     -abstinence from smoking and alcohol discussed once again and strongly encouraged.     Above discussed with him.  All questions answered.    He and his wife understand and agree with this plan.

## 2024-02-22 ENCOUNTER — HOSPITAL ENCOUNTER (OUTPATIENT)
Dept: RADIOLOGY | Facility: HOSPITAL | Age: 59
Discharge: HOME | End: 2024-02-22
Attending: OTOLARYNGOLOGY
Payer: MEDICAID

## 2024-02-22 DIAGNOSIS — C01 SQUAMOUS CELL CARCINOMA OF BASE OF TONGUE: ICD-10-CM

## 2024-02-22 DIAGNOSIS — Z92.3 HISTORY OF RADIATION TO HEAD AND NECK REGION: ICD-10-CM

## 2024-02-22 PROCEDURE — 25500020 PHARM REV CODE 255: Performed by: OTOLARYNGOLOGY

## 2024-02-22 PROCEDURE — 70491 CT SOFT TISSUE NECK W/DYE: CPT | Mod: TC

## 2024-02-22 RX ADMIN — IOHEXOL 100 ML: 350 INJECTION, SOLUTION INTRAVENOUS at 11:02

## 2024-02-27 RX ORDER — ONDANSETRON 4 MG/1
4 TABLET, ORALLY DISINTEGRATING ORAL EVERY 8 HOURS PRN
Qty: 15 TABLET | Refills: 0 | Status: SHIPPED | OUTPATIENT
Start: 2024-02-27

## 2024-02-27 NOTE — TELEPHONE ENCOUNTER
Pt requesting refill for ondansetron (ZOFRAN-ODT) 4 MG TbDL     LOV:1/3/24    NOV: none- unable contact pt to schedule appt

## 2024-02-29 ENCOUNTER — OFFICE VISIT (OUTPATIENT)
Dept: OTOLARYNGOLOGY | Facility: CLINIC | Age: 59
End: 2024-02-29
Payer: MEDICAID

## 2024-02-29 VITALS
DIASTOLIC BLOOD PRESSURE: 81 MMHG | TEMPERATURE: 98 F | HEIGHT: 71 IN | HEART RATE: 81 BPM | BODY MASS INDEX: 25.06 KG/M2 | RESPIRATION RATE: 16 BRPM | SYSTOLIC BLOOD PRESSURE: 135 MMHG | WEIGHT: 179 LBS

## 2024-02-29 DIAGNOSIS — C01 SQUAMOUS CELL CARCINOMA OF BASE OF TONGUE: ICD-10-CM

## 2024-02-29 DIAGNOSIS — Z85.89 ENCOUNTER FOR FOLLOW-UP SURVEILLANCE OF HEAD AND NECK CANCER: Primary | ICD-10-CM

## 2024-02-29 DIAGNOSIS — Z08 ENCOUNTER FOR FOLLOW-UP SURVEILLANCE OF HEAD AND NECK CANCER: Primary | ICD-10-CM

## 2024-02-29 DIAGNOSIS — K13.79 LESION OF HARD PALATE: ICD-10-CM

## 2024-02-29 PROBLEM — J38.7 LARYNGEAL MASS: Status: RESOLVED | Noted: 2023-10-02 | Resolved: 2024-02-29

## 2024-02-29 PROCEDURE — 40808 BIOPSY OF MOUTH LESION: CPT | Mod: PBBFAC | Performed by: STUDENT IN AN ORGANIZED HEALTH CARE EDUCATION/TRAINING PROGRAM

## 2024-02-29 PROCEDURE — 99214 OFFICE O/P EST MOD 30 MIN: CPT | Mod: PBBFAC,25 | Performed by: STUDENT IN AN ORGANIZED HEALTH CARE EDUCATION/TRAINING PROGRAM

## 2024-02-29 PROCEDURE — 88342 IMHCHEM/IMCYTCHM 1ST ANTB: CPT | Mod: TC | Performed by: STUDENT IN AN ORGANIZED HEALTH CARE EDUCATION/TRAINING PROGRAM

## 2024-02-29 NOTE — PROGRESS NOTES
[facility]  Otolaryngology Clinic Note    Yaron Brennan  Encounter Date: 2/29/2024  YOB: 1965  Physician: Rubin Hernandez    Chief Complaint: Otalgia, neck mass    HPI: Yaron Brennan is a 58 y.o. male with HTN who presents as referral from Dr. Welsh for squamous cell carcinoma of base of tongue. Patient reports that toward the beginning of the year he started to have worsening left otalgia. He then noticed a bump/swelling on left neck that he thought was was due to a bug bite. This got progressively bigger over time and he then noticed some swelling on right neck as well. He eventually presented to [facility] in early July 2022. He had a CT neck that showed an ulcerated mass in the tongue base, and underwent direct laryngoscopy with ENT on 7/6/22 (Dr. Tay). Operative findings: ulcerated friable mass base of the tongue that seems confined to base of tongue; lingual surface of epiglottis is free from tumor; mass involves bilateral base of tongue, significantly midline. Results returned positive for p16+ SCCa. Today in clinic patient reports bilateral L>R otalgia, mild dysphagia/odynophagia. He reports he eats basically whatever he wants without overt signs of aspiration. He does report 40lb weight loss in the last 6 months. + Neck LAD. Reports voice changes. Denies any issues breathing. Smoked 1ppd since 17 years old (40 years) but quit a month ago when he got his diagnosis. Also previously drank 12 pack of beer a day and likewise quite a month ago. Referred to ENT for consideration of possible surgical resection.     07/05/2022: CT soft tissue of the neck without contrast:  -ulcerated tongue base mass, at least 3.4 cm by 3.0 cm x 3.1 cm; associated bulky level 2 and level 3 cervical lymphadenopathy; suspected level 1 and level 4 cervical lymphadenopathy; a right level 2 lymph node with central necrosis 2.6 x 3.3 cm; a left level 3 lymph node 4.5 x 3.2 cm; no  significant airway compromise; no acute or aggressive bony lesions  -malignant ulcerated type mass with lymph node metastasis; no airway compromise; right upper lung lobe indistinct ground-glass pulmonary nodules typical of benign infectious or inflammatory etiology    8/16/22: Here today for follow up. Had dental extractions performed on 8/5. Not having any issues. Taking in 4-5 ensures daily. Trying to take some other soft foods like grits and pudding.     9/22/22: Here for follow up. Patient is undergoing CRT and tolerating it well. He reports he has one more chemotherapy session left and about 2-3 weeks of radiation left. Overall maintaining his weight. No progressive dysphagia, swallowing fine. Also no breathing issues or other complaints.    11/3/22: Patient is here today for follow up.  He finished CRT on 10/10.  Patient states he is still in some pain in  his neck and inside of his mouth.   He is tolerating p.o. intake but states at times he does have difficulty swallowing.  He does have xerostomia.  Patient has not used his eardrops to loosen the cerumen impaction.  Denies any shortness of breath or difficulty breathing/ changes to voice,  changes to his neck masses.    12/7/22:  Patient returns for surveillance today.  Patient denies any new dysphagia, odynophagia, H&N lumps or bumps, otalgia, weight loss.  Overall pain is getting better, tolerating diet, gaining weight.    03/14/2023 doing well, no pain, no otalgia, dysphagia has improved significantly, weight is stable.  No hemoptysis.  Voice remains hoarse.  No airway distress.    4/11/2023: Returns today for follow-up. Reports doing well overall. No dysphagia, swallowing all foods without issue. Voice stable. No odynophagia, no SOB, no otalgia. Does note feeling like ears are clogged, has tried ear drops without success. Did get TSH since last visit, wnl.    5/18/23: Patient here today for follow up. Doing well since last visit. No major concerns.  Denies weight loss, new lumps/bumps, sore throat, dysphagia, odynophagia, or otalgia. Having some pain when chewing. Does not have dentures.     6/13/23: Returns today for follow-up/surveillance. Patient reports doing well overall. No issues with dysphagia, odynophagia, voice changes, otalgia, or weight loss. Still looking for dentist to get dentures made. Did get CT neck since last visit, but CXR and TSH still pending.     7/19/23: Here for surveillance. He is doing well. He is swallowing food of all consistencies without issue - no coughing or choking. He denies voice changes, weight loss, pain, or new lumps/bumps. He is breathing comfortably and active. Having trouble with sleep at night.    8/16/23: He has been doing very well since his last visit no issues at all.  He has eating a regular diet and no issues swallowing.  No voice changes lumps or bumps in her neck.    9/19/23:  Here today for follow-up after CT.  No issues in the interim.  Dysphagia and odynophagia are stable: Able to eat most things except for tough meat.  Weight has been stable.  No noted lumps/bumps.    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  Patient has done well postoperatively.  He still has a mild sore throat but this is improving.  He has no respiratory distress or dysphagia.  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  There was no evidence of malignancy.  Results were discussed with the patient.  Patient has no new problems today.    December 19, 2023:   Patient presents today for routine surveillance.  Patient is doing well.  He is tolerating his diet.  His only complaint is that of some xerostomia but this is not a significant problem.  He has no complaints of sore throat, ear pain, swelling in the neck, or difficulty  swallowing.  He has no other new problems today.    2024:   Patient presents today for routine follow-up.  Patient is doing well.  He had been seen by Dr. Leavitt, radiation Oncology, last week who found the patient to be without evident disease.  Patient continues to do well.  He has no complaints of sore throat, ear pain, difficulty swallowing, or swelling in the neck.  No hemoptysis.  Appetite has been good.  He has no other new problems today.    24: here today for follow up. No new issues. Denies sore throat, otalgia, dysphagia, or neck lumps/bumps. Has been maintaining weight with good appetite.     Review of patient's allergies indicates:  No Known Allergies    Past Medical History:   Diagnosis Date    Cancer     Hypertension        Past Surgical History:   Procedure Laterality Date    BACK SURGERY      COLONOSCOPY  10/11/2023    DIRECT LARYNGOSCOPY N/A 2022    Procedure: LARYNGOSCOPY, DIRECT;  Surgeon: Mekhi Grossman MD;  Location: [facility];  Service: ENT;  Laterality: N/A;    DIRECT LARYNGOSCOPY N/A 10/02/2023    Procedure: LARYNGOSCOPY, DIRECT;  Surgeon: Mekhi Grossman MD;  Location: [facility] OR;  Service: ENT;  Laterality: N/A;    EXTRACTION OF TOOTH N/A 2022    Procedure: EXTRACTION, TEETH;  Surgeon: Mekhi Grossman MD;  Location: [facility];  Service: ENT;  Laterality: N/A;    SPINE SURGERY      Not sure of date maybe in        Social History     Socioeconomic History    Marital status:      Spouse name: Blanca   Occupational History    Occupation: Disabled   Tobacco Use    Smoking status: Former     Current packs/day: 0.00     Types: Cigarettes     Start date: 1982     Quit date: 2022     Years since quittin.6    Smokeless tobacco: Never    Tobacco comments:     Been almost 35 yr   Substance and Sexual Activity    Alcohol use: Not Currently    Drug use: Yes     Types: Marijuana     Comment: Daily    Sexual activity: Yes     Partners: Female     Birth  control/protection: None     Social Determinants of Health     Financial Resource Strain: High Risk (1/1/2024)    Overall Financial Resource Strain (CARDIA)     Difficulty of Paying Living Expenses: Very hard   Food Insecurity: No Food Insecurity (1/1/2024)    Hunger Vital Sign     Worried About Running Out of Food in the Last Year: Never true     Ran Out of Food in the Last Year: Never true   Transportation Needs: No Transportation Needs (1/1/2024)    PRAPARE - Transportation     Lack of Transportation (Medical): No     Lack of Transportation (Non-Medical): No   Physical Activity: Insufficiently Active (1/1/2024)    Exercise Vital Sign     Days of Exercise per Week: 4 days     Minutes of Exercise per Session: 30 min   Stress: No Stress Concern Present (1/1/2024)    [employer] - Occupational Stress Questionnaire     Feeling of Stress : Only a little   Social Connections: Unknown (1/1/2024)    Social Connection and Isolation Panel [NHANES]     Frequency of Communication with Friends and Family: More than three times a week     Frequency of Social Gatherings with Friends and Family: Twice a week     Active Member of Clubs or Organizations: Yes     Attends Club or Organization Meetings: More than 4 times per year     Marital Status:    Housing Stability: Patient Declined (1/1/2024)    Housing Stability Vital Sign     Unable to Pay for Housing in the Last Year: Patient declined     Unstable Housing in the Last Year: Patient declined       Family History   Problem Relation Age of Onset    Hypertension Mother     Cancer Father     Hypertension Sister     Hypertension Brother        Outpatient Encounter Medications as of 2/29/2024   Medication Sig Dispense Refill    amLODIPine (NORVASC) 10 MG tablet Take 1 tablet (10 mg total) by mouth once daily. 90 tablet 3    melatonin (MELATIN) 3 mg tablet Take 2 tablets (6 mg total) by mouth nightly as needed for Insomnia. 30 tablet 0     "ondansetron (ZOFRAN-ODT) 4 MG TbDL Take 1 tablet (4 mg total) by mouth every 8 (eight) hours as needed (nausea). 15 tablet 0    tiZANidine (ZANAFLEX) 4 MG tablet Take 1 tablet (4 mg total) by mouth every 8 (eight) hours as needed (muscle pain or spasm). 60 tablet 1    traZODone (DESYREL) 100 MG tablet Take 1 tablet (100 mg total) by mouth every evening. 30 tablet 6    nabumetone (RELAFEN) 750 MG tablet Take 1 tablet (750 mg total) by mouth 2 (two) times daily as needed for Pain. (Patient not taking: Reported on 2/7/2024) 60 tablet 1    predniSONE (DELTASONE) 20 MG tablet One tablet orally twice daily for 3 days and then once daily for 2 days (Patient not taking: Reported on 2/21/2024) 8 tablet 0    [DISCONTINUED] ondansetron (ZOFRAN-ODT) 4 MG TbDL Take 1 tablet (4 mg total) by mouth every 8 (eight) hours as needed (nausea). 15 tablet 1     Facility-Administered Encounter Medications as of 2/29/2024   Medication Dose Route Frequency Provider Last Rate Last Admin    lactated ringers infusion   Intravenous Continuous Elzbieta Quesada MD 10 mL/hr at 08/05/22 0808 New Bag at 08/05/22 0808    LIDOcaine (PF) 10 mg/ml (1%) injection 10 mg  1 mL Intradermal Once Yajaira Watts FNP        LIDOcaine (PF) 10 mg/ml (1%) injection 10 mg  1 mL Intradermal Once Yajaira Watts FNP        LIDOcaine (PF) 40 mg/mL (4 %) injection 1 mL  1 mL Other 1 time in Clinic/HOD Mekhi Grossman MD        phenylephrine HCL 1% nasal spray 1 spray  1 spray Each Nostril 1 time in Clinic/HOD Mekhi Grossman MD        sodium chloride 0.9% flush 10 mL  10 mL Intravenous PRN Anna Taylor MD        sodium chloride 0.9% flush 10 mL  10 mL Intravenous PRN Jose Johns MD           Physical Exam:  /81 (BP Location: Right arm, Patient Position: Sitting, BP Method: Medium (Automatic))   Pulse 81   Temp 97.9 °F (36.6 °C) (Oral)   Resp 16   Ht 5' 11" (1.803 m)   Wt 81.2 kg (179 lb)   BMI 24.97 kg/m²   General:  " Well-developed well-nourished male in no acute distress.  Voice is normal.  Head and face:  Normocephalic.  No facial lesions.  No temporomandibular joint tenderness or click.  Ears:  Right ear-auricle is normally developed.  External auditory canal is clear.  Tympanic membrane is nonerythematous.  No middle ear effusion.  Left ear-auricle is normally developed.  External auditory canal is clear.  Tympanic membrane is nonerythematous.  No middle ear effusion.  Nose:  Nasal dorsum is unremarkable.  No significant septal deviation.  No significant intranasal congestion.  Secretions are clear.  Oral cavity and oropharynx:  Tongue and floor mouth are without lesions.  Mucosa is moist.  No pharyngeal erythema or exudates.  No oropharyngeal masses.  No tonsillar hypertrophy. There is a 4mm round papillomatous lesion on the left posterior hard palate. See picture below  Neck:  Supple without adenopathy or thyromegaly.  Trachea is in the midline.  Parotid and submandibular glands are normal to palpation without masses or tenderness.  Eyes:  Extraocular muscles intact.  No nystagmus.  No exophthalmos or enophthalmos.  Neurologic:  Alert and oriented.  Cranial nerves 2-12 are grossly normal.    2/29/24        PROCEDURE: Biopsy of left hard palate    Indication: Lesion of left hard palate    Anesthesia: 1% lidocaine with 1:100,000 epinephrine     The risks and benefits of procedure discussed with patient.  Informed  consent was given to proceed.     First, the left hard palate was injected with 1% lidocaine with 1:100,000 epinephrine. After adequate time had elapsed for anesthesia to take effect, a 5mm punch biopsy and tissue scissors were used to remove a piece of tissue from the left hard palate. This was placed into formalin for pathology. Hemostasis was achieved with pressure. The patient tolerated the procedure well. Blood loss was minimal.       Pertinent Data:    CT soft tissue neck (2/22/24): I have personally reviewed  this image. No new masses or lesions. No concerning lymphadenopathy. No prominence of base of tongue. No evidence of disease.       Assessment:  Yaron Brennan is a 58 y.o. 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 CRT, finished 10/10/22.  Most recent CT (2/22/24) without evidence of disease. PET 1/11/23 without disease (in [facility] system), TSH wnl 7/20/23. Today he has a lesion on the left hard palate which was biopsied, but no other evidence of malignancy.     Plan:   - Biopsy obtained today, please see procedure note above  - CT reviewed with patient today; no evidence of disease. Next surveillance CT due in October 2024 for the 2 year surveillance paul.   - Follow up in 1 week telemedicine visit to review pathology results. Follow-up in 2 months for routine surveillance.    Rubin Hernandez MD  [facility] Otolaryngology PGY-4  02/29/2024 12:50 PM      HEAD & NECK CANCER SURVEILLANCE   Radiation and/or Chemotherapy     Medical Oncologist: Kevon Welsh MD  Radiation Oncologist: Unknown    Primary tumor: cT3 cN2 M0 p16+ base of tongue    Date of Diagnosis: 7/6/22  Therapy: Cisplatin 100 mg/m² IV days 1, 22, and 23, concurrent with RT  Completion Date: 10/10/22    Pertinent Treatment History:   First presentation 7/2022  CT neck 7/5/22 & 7/29/22  Panendoscopy 7/6/22  Right UE DVT 2/2 PICC 9/15/22 on Xarelto  CRT 8/25/22 - 10/10/22  Direct laryngoscopy with biopsy of benign laryngeal ductal cyst on October 2, 2023.      Place date if completed   3 6 12 18 24 36 48 60   CT Neck 1/31/23 6/8/23 8/25/23 2/22/24 X X X X   PET/CT 1/11/23          CXR  7/17/23 X X X X X X   TFT 2/2/23 7/20/23 X  X X X X     Current Surveillance Interval: post-tx year 1-2, q2-3 mo

## 2024-03-05 ENCOUNTER — TELEPHONE (OUTPATIENT)
Dept: INTERNAL MEDICINE | Facility: CLINIC | Age: 59
End: 2024-03-05
Payer: MEDICAID

## 2024-03-05 NOTE — TELEPHONE ENCOUNTER
----- Message from Avery Lovett sent at 3/5/2024 10:57 AM CST -----  Type:  Patient Returning Call    Who Called:pt     Best Call Back Number:[phone]  Additional Information: pt returning a missed call from last week, not sure what call was in reference to , believes it was to schedule an appt not sure why   Please call pt to follow up

## 2024-03-06 LAB
DHEA SERPL-MCNC: NORMAL
ESTROGEN SERPL-MCNC: NORMAL PG/ML
INSULIN SERPL-ACNC: NORMAL U[IU]/ML
LAB AP CLINICAL INFORMATION: NORMAL
LAB AP GROSS DESCRIPTION: NORMAL
LAB AP REPORT FOOTNOTES: NORMAL
T3RU NFR SERPL: NORMAL %

## 2024-03-13 ENCOUNTER — CLINICAL SUPPORT (OUTPATIENT)
Dept: OTOLARYNGOLOGY | Facility: CLINIC | Age: 59
End: 2024-03-13
Payer: MEDICAID

## 2024-03-13 DIAGNOSIS — D36.9 SQUAMOUS CELL PAPILLOMA: Primary | ICD-10-CM

## 2024-03-13 NOTE — PROGRESS NOTES
Established Patient - Audio Only Telehealth Visit     The patient location is:  Home  The chief complaint leading to consultation is:  Pathology results  Visit type: Virtual visit with audio only (telephone)  Total time spent with patient:  10 minute reviewing chart and discussing with the patient       The reason for the audio only service rather than synchronous audio and video virtual visit was related to technical difficulties or patient preference/necessity.     Each patient to whom I provide medical services by telemedicine is:  (1) informed of the relationship between the physician and patient and the respective role of any other health care provider with respect to management of the patient; and (2) notified that they may decline to receive medical services by telemedicine and may withdraw from such care at any time. Patient verbally consented to receive this service via voice-only telephone call.       HPI:  Phone call is for pathology results of the biopsy by Dr. Hernandez, pathology was benign and this was discussed with the patient.  Patient denies having any current complaints.     Assessment and plan:  Squamous papilloma  Keep follow-up routine surveillance visits                        This service was not originating from a related E/M service provided within the previous 7 days nor will  to an E/M service or procedure within the next 24 hours or my soonest available appointment.  Prevailing standard of care was able to be met in this audio-only visit.

## 2024-03-16 NOTE — PROGRESS NOTES
History:  Past Medical History:   Diagnosis Date    Cancer     Hypertension    Past medical history:  Tobacco abuse since age 17. Intermittent alcohol use.  Procedure/surgical history: Back surgery (20-30 years ago, in use 10, apparently involving L5 fusion for prolapse this from work-related injury).  Social history:  Has been smoking a pack of cigarettes daily since age 17 years.  Has been drinking 6 beers daily for last 40 years.  Lately, trying to quit smoking and drinking.  Smokes marijuana.  .  Has 2 biological children of his own.  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  Lives in [place], [place]..  Family history:  Father  from some kind of cancer at age 72 years.  Health maintenance:  Does not have a regular primary care provider.  Does not visit with doctors.  No screening colonoscopy ever    Past Surgical History:   Procedure Laterality Date    BACK SURGERY      COLONOSCOPY  10/11/2023    DIRECT LARYNGOSCOPY N/A 2022    Procedure: LARYNGOSCOPY, DIRECT;  Surgeon: Mekhi Grossman MD;  Location: [facility];  Service: ENT;  Laterality: N/A;    DIRECT LARYNGOSCOPY N/A 10/02/2023    Procedure: LARYNGOSCOPY, DIRECT;  Surgeon: Mekhi Grossman MD;  Location: [facility];  Service: ENT;  Laterality: N/A;    EXTRACTION OF TOOTH N/A 2022    Procedure: EXTRACTION, TEETH;  Surgeon: Mekhi Grossman MD;  Location: [facility];  Service: ENT;  Laterality: N/A;    SPINE SURGERY      Not sure of date maybe in       Social History     Socioeconomic History    Marital status:      Spouse name: Blanca   Occupational History    Occupation: Disabled   Tobacco Use    Smoking status: Former     Current packs/day: 0.00     Types: Cigarettes     Start date: 1982     Quit date: 2022     Years since quittin.7    Smokeless tobacco: Never    Tobacco comments:     Been almost 35 yr   Substance and Sexual Activity    Alcohol  use: Not Currently    Drug use: Yes     Types: Marijuana     Comment: Daily    Sexual activity: Yes     Partners: Female     Birth control/protection: None     Social Determinants of Health     Financial Resource Strain: High Risk (1/1/2024)    Overall Financial Resource Strain (CARDIA)     Difficulty of Paying Living Expenses: Very hard   Food Insecurity: No Food Insecurity (1/1/2024)    Hunger Vital Sign     Worried About Running Out of Food in the Last Year: Never true     Ran Out of Food in the Last Year: Never true   Transportation Needs: No Transportation Needs (1/1/2024)    PRAPARE - Transportation     Lack of Transportation (Medical): No     Lack of Transportation (Non-Medical): No   Physical Activity: Insufficiently Active (1/1/2024)    Exercise Vital Sign     Days of Exercise per Week: 4 days     Minutes of Exercise per Session: 30 min   Stress: No Stress Concern Present (1/1/2024)    [employer] [place] [employer] - Occupational Stress Questionnaire     Feeling of Stress : Only a little   Social Connections: Unknown (1/1/2024)    Social Connection and Isolation Panel [NHANES]     Frequency of Communication with Friends and Family: More than three times a week     Frequency of Social Gatherings with Friends and Family: Twice a week     Active Member of Clubs or Organizations: Yes     Attends Club or Organization Meetings: More than 4 times per year     Marital Status:    Housing Stability: Patient Declined (1/1/2024)    Housing Stability Vital Sign     Unable to Pay for Housing in the Last Year: Patient declined     Unstable Housing in the Last Year: Patient declined      Family History   Problem Relation Age of Onset    Hypertension Mother     Cancer Father     Hypertension Sister     Hypertension Brother         Reason for Follow-up:  -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; p16 +; cT3 cN2 M0, clinical stage II, S/P panendoscopy and biopsy 07/06/2022   -PICC line-related DVT right  subclavian, axillary, and basilic veins 09/15/2022  -history of tobacco abuse, alcohol abuse    History of Present Illness:   Squamous cell carcinoma of base of tongue        Oncologic/Hematologic History:  Oncology History   Squamous cell carcinoma of base of tongue   6/6/2022 Cancer Staged    Staging form: Pharynx - HPV-Mediated Oropharynx, AJCC 8th Edition  - Clinical stage from 6/6/2022: Stage II (cT2, cN2, cM0, p16+)     7/16/2022 Initial Diagnosis    Squamous cell carcinoma of base of tongue     8/25/2022 - 10/11/2022 Chemotherapy    Treatment Summary   Plan Name: OP HEAD NECK CISPLATIN Q3W  Treatment Goal: Curative  Status: Inactive  Start Date: 8/25/2022  End Date: 10/11/2022  Provider: Kevon Welsh MD  Chemotherapy: CISplatin (PLATINOL) 100 mg/m2 = 187 mg in sodium chloride 0.9% 1,187 mL chemo infusion, 100 mg/m2 = 187 mg, Intravenous, Clinic/HOD 1 time, 3 of 3 cycles  Administration: 187 mg (8/25/2022), 187 mg (9/19/2022), 187 mg (10/10/2022)     57-year-old gentleman     He was admitted to [facility] 07/2022 with 2 months history of progressive dysphagia, odynophagia, soreness of tongue, 20 lb weight loss and bilateral neck masses.  Also, hoarseness.  CT neck showed ulcerated mass in the tongue base, malignant-appearing, along with lymph node metastasis.  ENT performed a laryngoscopy with biopsy of tongue mass.  Friable base of tongue mass.  No airway compromise.  Right upper lung lobe indistinct, ground-glass lung nodules, typical of a benign inflammatory etiology.  Discharged 07/07/2022.  Patient reported weight loss of> 20 lb in previous 2 months.    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, p16 +:  -presentation: 07/2022:  Dysphagia, odynophagia, 20 lb weight loss, bilateral neck masses, hoarseness  -CT soft tissues of the neck without contrast 07/05/2022, 07/29/2022  -S/P panendoscopy 07/06/2022  -ulcerated tongue base mass, at least 5  cm  -ulcerated friable mass base of the tongue, confined base of tongue on panendoscopy  -right level 2 lymph node 3.2 x 2.9 cm on CT  -left level 3 lymph node 5.1 x 4.2 cm on CT  -invasive squamous cell carcinoma, grade 3 of 4; p16 +  -no intrathoracic metastasis on CT chest  >>cT3 cN2 M0, clinical stage II  -08/25/2022:  High-dose cisplatin, concurrent with radiotherapy, started  -09/14/2022: Staging PET-CT (comparison:  CT soft tissues of the neck 07/29/2022):  1. Tongue base midline central necrotic ABD hypermetabolic mass, consistent with squamous cell carcinoma (4.2 x 2.4 cm, maximum SUV 10.02  2. Bilateral neck hypermetabolic enlarged lymph nodes are consistent with metastases (left neck level 3 lymph node, centrally necrotic sabrina complex, 4.6 x 4.4 cm, maximum SUV 5.27); left neck level 2 hypermetabolic enlarged lymph node, 2.2 x 1.8 cm, maximum SUV 7.05) (right neck level 2 central necrotic peripherally hypermetabolic enlarged lymph node, 3.0 x 2.4 cm, maximum SUV 4.27)  -09/15/2022:  CV ultrasound Doppler venous right upper extremity (pain and swelling right upper extremity):  + for DVT; extensive thrombus seen in right subclavian, axilla, and basilic veins; also, thrombus in proximal cephalic vein (started on Xarelto)  (The DVT was related to PICC line; it was removed by surgery on 09/15/2022; MediPort was unable to be placed)  -cycle 3 of high-dose cisplatin 10/11/2022  -S/P radiotherapy 08/24/2022-10/11/2022 (7000 cGy; 35 fractions; 48 elapsed days)  -11/21/2022: Cologuard +  -01/11/2023:  Restaging PET-CT (comparison:  Whole-body PET-CT 08/18/2022):  No residual primary tongue malignancy or cervical metastatic lymphadenopathy and no new metastatic disease identified at the neck or distantly        07/18/2022:  Presents for initial medical oncology consultation, accompanied by his wife.  Pleasant gentleman.  In no acute discomfort.  40 lb weight loss in last 3 months.  Appetite is preserved.   Dysphagia.  No odynophagia.  Pain left side of throat overall the lymphadenopathy as well as during swallowing.  No hemoptysis, nausea, vomiting.  No dyspnea. Left-sided otalgia.  ECOG 0.   Chronic tobacco and alcohol abuse (see above).  Bilateral cervical lymphadenopathy started 3 months ago; slowly progressive.  -no unusual headaches, loss of consciousness, seizures, stroke-like symptoms, fevers, or chills.  No abdominal pain, nausea, vomiting.    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)  -10/02/2023: Epiglottic lesion, biopsy:  Respiratory mucosa with underlying epithelial lined cyst, consistent with laryngeal ductal cysts; negative for malignancy  -10/11/2023:  Colonoscopy (positive Cologuard) (Joseph Louise MD):  Second-degree hemorrhoids; repeat colonoscopy in 10 years  -02/22/2024: CT soft tissues of the neck with contrast (comparison: CT neck 08/25/2023):  Posttreatment changes of the neck with improving mucosal and subcutaneous edema; no cervical lymphadenopathy  -02/29/2024:  Left hard palate, biopsy:  Squamous papilloma; 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; negative for malignancy  -02/21/2024: TSH 2.718, normal  -03/18/2024: Labs reviewed; hemoglobin 13.3; CBC essentially unremarkable; CMP reviewed, essentially unremarkable; TSH, free T4 normal  Presents for follow-up visit..  No complaints.  Great appetite.  Says that he stopped drinking and smoking when he was diagnosed with throat cancer.  Does smoke marijuana daily, however.  Has been smoking marijuana for at least 20 years.  No unusual headaches, focal neurological symptoms, chest pain, cough, dyspnea, hemoptysis, throat pain, dysphagia, odynophagia, otalgia, dysphonia,  etc..  ECOG 0.      Medications:  Current Outpatient Medications on File Prior to Visit   Medication Sig Dispense Refill    amLODIPine (NORVASC) 10 MG tablet Take 1 tablet (10 mg total) by mouth once daily. 90 tablet 3    melatonin (MELATIN) 3 mg tablet Take 2 tablets (6 mg total) by mouth nightly as needed for Insomnia. 30 tablet 0    ondansetron (ZOFRAN-ODT) 4 MG TbDL Take 1 tablet (4 mg total) by mouth every 8 (eight) hours as needed (nausea). 15 tablet 0    tiZANidine (ZANAFLEX) 4 MG tablet Take 1 tablet (4 mg total) by mouth every 8 (eight) hours as needed (muscle pain or spasm). 60 tablet 1    traZODone (DESYREL) 100 MG tablet Take 1 tablet (100 mg total) by mouth every evening. 30 tablet 6    nabumetone (RELAFEN) 750 MG tablet Take 1 tablet (750 mg total) by mouth 2 (two) times daily as needed for Pain. (Patient not taking: Reported on 2/7/2024) 60 tablet 1    predniSONE (DELTASONE) 20 MG tablet One tablet orally twice daily for 3 days and then once daily for 2 days (Patient not taking: Reported on 2/21/2024) 8 tablet 0     Current Facility-Administered Medications on File Prior to Visit   Medication Dose Route Frequency Provider Last Rate Last Admin    lactated ringers infusion   Intravenous Continuous Elzbieta Quesada MD 10 mL/hr at 08/05/22 0808 New Bag at 08/05/22 0808    LIDOcaine (PF) 10 mg/ml (1%) injection 10 mg  1 mL Intradermal Once Yajaira Watts FNP        LIDOcaine (PF) 10 mg/ml (1%) injection 10 mg  1 mL Intradermal Once Yajaira Watts FNP        LIDOcaine (PF) 40 mg/mL (4 %) injection 1 mL  1 mL Other 1 time in Clinic/HOD Mekhi Grossman MD        phenylephrine HCL 1% nasal spray 1 spray  1 spray Each Nostril 1 time in Clinic/HOD Mekhi Grossman MD        sodium chloride 0.9% flush 10 mL  10 mL Intravenous PRN Anna Taylor MD        sodium chloride 0.9% flush 10 mL  10 mL Intravenous PRN Jose Johns MD           Review of Systems:   All systems reviewed and found  "to be negative except for the symptoms detailed above    Physical Examination:   VITAL SIGNS:   Vitals:    03/18/24 0835   BP: (!) 143/94   Pulse: 87   Resp: 18   Temp: 98.1 °F (36.7 °C)       GENERAL:  In no apparent distress.    HEAD:  No signs of head trauma.  EYES:  Pupils are equal.  Extraocular motions intact.    EARS:  Hearing grossly intact.  MOUTH:  Oropharynx is normal.   NECK:  No adenopathy, no JVD.     CHEST:  Chest with clear breath sounds bilaterally.  No wheezes, rales, rhonchi.    CARDIAC:  Regular rate and rhythm.  S1 and S2, without murmurs, gallops, rubs.  VASCULAR:  No Edema.  Peripheral pulses normal and equal in all extremities.  ABDOMEN:  Soft, without detectable tenderness.  No sign of distention.  No   rebound or guarding, and no masses palpated.   Bowel Sounds normal.  MUSCULOSKELETAL:  Good range of motion of all major joints. Extremities without clubbing, cyanosis or edema.    NEUROLOGIC EXAM:  Alert and oriented x 3.  No focal sensory or strength deficits.   Speech normal.  Follows commands.  PSYCHIATRIC:  Mood normal.    No results for input(s): "CBC" in the last 72 hours.   No results for input(s): "CMP" in the last 72 hours.     Assessment:  Problem List Items Addressed This Visit          Psychiatric    Alcohol use - Primary       Hematology    Acute deep vein thrombosis (DVT) of axillary vein of right upper extremity    Right subclavian vein thrombosis       Oncology    Squamous cell carcinoma of base of tongue    Secondary malignant neoplasm of cervical lymph node    Positive colorectal cancer screening using Cologuard test    Encounter for follow-up surveillance of head and neck cancer       Other    Tobacco abuse (Chronic)     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, p16 +:  -presentation: 07/2022:  Dysphagia, odynophagia, 20 lb weight loss, bilateral neck masses, hoarseness  -CT soft tissues of the neck without contrast 07/05/2022, 07/29/2022  -S/P panendoscopy " 07/06/2022  -ulcerated tongue base mass, at least 5 cm  -ulcerated friable mass base of the tongue, confined base of tongue on panendoscopy  -right level 2 lymph node 3.2 x 2.9 cm on CT, 3.0 x 2.4 cm on PET-CT  -left level 3 lymph node 5.1 x 4.2 cm on CT, 4.6 x 4.4 cm on PET-CT  -left neck level 2 lymph node, 2.2 x 1.8 cm on PET-CT  -invasive squamous cell carcinoma, grade 3 of 4; p16 +  -no intrathoracic metastasis on CT chest  >>cT3 cN2 M0, clinical stage II  -plan: Chemoradiation therapy  -S/P high-dose cisplatin every 3 weeks x3 (08/25/2022-10/11/2022)  -S/P radiotherapy 08/24/2022-10/11/2022 (7000 cGy; 35 fractions; 48 elapsed days)  -11/21/2022: Cologuard +  -01/11/2023: Restaging PET-CT:  Complete response  -CT neck 08/25/2023:  IVAN  -direct laryngoscopy 10/02/2023:  (preop diagnosis: Epiglottic mass):  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 [biopsy of epiglottic lesion:  Negative for malignancy   -10/11/2023:  Colonoscopy (positive Cologuard) (Joseph Louise MD):  Second-degree hemorrhoids; repeat colonoscopy in 10 years  -CT neck 02/22/2024:  IVAN  -02/29/2024:  Left hard palate, biopsy:  Squamous papilloma; 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; negative for malignancy  -02/21/2024: TSH 2.718, normal       PICC line related DVT right subclavian, axillary, and basilic veins (09/15/2022):   -presentation:  Pain and swelling right upper extremity  -anticoagulation with Xarelto started 09/15/2022       # history of tobacco abuse since age 17  # history of alcohol use        Plan:  Continue to follow-up with ENT  Follow-up with NP in 6 months with CBC, CMP, TSH, free T4.  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 [biopsy of epiglottic lesion:  Negative for malignancy   -CT neck 02/22/2024:  IVAN  -02/29/2024:  Left hard palate, biopsy:  Squamous papilloma; 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; negative for malignancy  -02/21/2024: TSH 2.718, normal  >>>  Continue surveillance with ENT  We will follow him peripherally  Follow-up in 6 months, with CBC, CMP, and TSH    -developed extensive DVT right upper extremity, including subclavian vein, axillary vein, and basilic vein 09/15/2022, secondary to PICC line  -right-sided PICC line removed; MediPort was unable to be placed; chemotherapy is being continued with PICC line on the left side   -anticoagulation with Xarelto started 09/15/2022; planned to continue for 3 months (until middle/end of December 2022)  (He stopped Xarelto 12/15/2022, appropriately so)    -11/21/2022: Cologuard +  -10/11/2023:  Colonoscopy (positive Cologuard) (Joseph Louise MD):  Second-degree hemorrhoids; repeat colonoscopy in 10 years  >>>  -repeat colonoscopy in 10 years     -abstinence from smoking and alcohol discussed once again and strongly encouraged.  -03/18/2024: Tells me that he quit smoking tobacco and drinking alcohol when he was diagnosed with throat cancer; however, does smoke marijuana daily, and aspirin smoke anymore last 20 years at least.     Follow-up in 6 months with CBC, CMP, and TSH level; to see NP   In the interim, close follow-up with ENT per schedule.    Above discussed with him.  All questions answered.    Discussed labs and scans and gave him copies of relevant records.  He and his wife understand and agree with this plan.    Follow-up:  No follow-ups on file.

## 2024-03-18 ENCOUNTER — OFFICE VISIT (OUTPATIENT)
Dept: HEMATOLOGY/ONCOLOGY | Facility: CLINIC | Age: 59
End: 2024-03-18
Attending: INTERNAL MEDICINE
Payer: MEDICAID

## 2024-03-18 VITALS
HEIGHT: 71 IN | WEIGHT: 182.19 LBS | DIASTOLIC BLOOD PRESSURE: 94 MMHG | HEART RATE: 87 BPM | OXYGEN SATURATION: 97 % | BODY MASS INDEX: 25.51 KG/M2 | RESPIRATION RATE: 18 BRPM | TEMPERATURE: 98 F | SYSTOLIC BLOOD PRESSURE: 143 MMHG

## 2024-03-18 DIAGNOSIS — C01 SQUAMOUS CELL CARCINOMA OF BASE OF TONGUE: ICD-10-CM

## 2024-03-18 DIAGNOSIS — I82.A11 ACUTE DEEP VEIN THROMBOSIS (DVT) OF AXILLARY VEIN OF RIGHT UPPER EXTREMITY: ICD-10-CM

## 2024-03-18 DIAGNOSIS — Z08 ENCOUNTER FOR FOLLOW-UP SURVEILLANCE OF HEAD AND NECK CANCER: ICD-10-CM

## 2024-03-18 DIAGNOSIS — I82.B11 RIGHT SUBCLAVIAN VEIN THROMBOSIS: ICD-10-CM

## 2024-03-18 DIAGNOSIS — C77.0 SECONDARY MALIGNANT NEOPLASM OF CERVICAL LYMPH NODE: ICD-10-CM

## 2024-03-18 DIAGNOSIS — Z78.9 ALCOHOL USE: ICD-10-CM

## 2024-03-18 DIAGNOSIS — Z85.89 ENCOUNTER FOR FOLLOW-UP SURVEILLANCE OF HEAD AND NECK CANCER: ICD-10-CM

## 2024-03-18 DIAGNOSIS — Z72.0 TOBACCO ABUSE: Chronic | ICD-10-CM

## 2024-03-18 DIAGNOSIS — C01 SQUAMOUS CELL CARCINOMA OF BASE OF TONGUE: Primary | ICD-10-CM

## 2024-03-18 DIAGNOSIS — Z78.9 ALCOHOL USE: Primary | ICD-10-CM

## 2024-03-18 DIAGNOSIS — R19.5 POSITIVE COLORECTAL CANCER SCREENING USING COLOGUARD TEST: ICD-10-CM

## 2024-03-18 PROCEDURE — 99214 OFFICE O/P EST MOD 30 MIN: CPT | Mod: S$PBB,,, | Performed by: INTERNAL MEDICINE

## 2024-03-18 PROCEDURE — 1159F MED LIST DOCD IN RCRD: CPT | Mod: CPTII,,, | Performed by: INTERNAL MEDICINE

## 2024-03-18 PROCEDURE — 3080F DIAST BP >= 90 MM HG: CPT | Mod: CPTII,,, | Performed by: INTERNAL MEDICINE

## 2024-03-18 PROCEDURE — 99214 OFFICE O/P EST MOD 30 MIN: CPT | Mod: PBBFAC | Performed by: INTERNAL MEDICINE

## 2024-03-18 PROCEDURE — 3008F BODY MASS INDEX DOCD: CPT | Mod: CPTII,,, | Performed by: INTERNAL MEDICINE

## 2024-03-18 PROCEDURE — 1160F RVW MEDS BY RX/DR IN RCRD: CPT | Mod: CPTII,,, | Performed by: INTERNAL MEDICINE

## 2024-03-18 PROCEDURE — 3077F SYST BP >= 140 MM HG: CPT | Mod: CPTII,,, | Performed by: INTERNAL MEDICINE

## 2024-03-25 ENCOUNTER — DOCUMENTATION ONLY (OUTPATIENT)
Dept: HEMATOLOGY/ONCOLOGY | Facility: CLINIC | Age: 59
End: 2024-03-25
Payer: MEDICAID

## 2024-04-12 ENCOUNTER — TELEPHONE (OUTPATIENT)
Dept: INTERNAL MEDICINE | Facility: CLINIC | Age: 59
End: 2024-04-12
Payer: MEDICAID

## 2024-04-16 RX ORDER — AMLODIPINE BESYLATE 10 MG/1
10 TABLET ORAL DAILY
Qty: 90 TABLET | Refills: 0 | Status: SHIPPED | OUTPATIENT
Start: 2024-04-16 | End: 2024-04-30 | Stop reason: SDUPTHER

## 2024-04-30 ENCOUNTER — OFFICE VISIT (OUTPATIENT)
Dept: OTOLARYNGOLOGY | Facility: CLINIC | Age: 59
End: 2024-04-30
Payer: MEDICAID

## 2024-04-30 ENCOUNTER — OFFICE VISIT (OUTPATIENT)
Dept: INTERNAL MEDICINE | Facility: CLINIC | Age: 59
End: 2024-04-30
Payer: MEDICAID

## 2024-04-30 VITALS
OXYGEN SATURATION: 100 % | SYSTOLIC BLOOD PRESSURE: 136 MMHG | HEART RATE: 90 BPM | DIASTOLIC BLOOD PRESSURE: 77 MMHG | BODY MASS INDEX: 25.11 KG/M2 | WEIGHT: 179.38 LBS | HEIGHT: 71 IN | TEMPERATURE: 98 F

## 2024-04-30 VITALS
HEART RATE: 83 BPM | WEIGHT: 178 LBS | SYSTOLIC BLOOD PRESSURE: 110 MMHG | RESPIRATION RATE: 18 BRPM | HEIGHT: 70 IN | DIASTOLIC BLOOD PRESSURE: 74 MMHG | BODY MASS INDEX: 25.48 KG/M2

## 2024-04-30 DIAGNOSIS — Z92.3 HISTORY OF RADIATION TO HEAD AND NECK REGION: ICD-10-CM

## 2024-04-30 DIAGNOSIS — M54.16 LUMBAR RADICULOPATHY, CHRONIC: ICD-10-CM

## 2024-04-30 DIAGNOSIS — F19.982 DRUG-INDUCED INSOMNIA: Chronic | ICD-10-CM

## 2024-04-30 DIAGNOSIS — Z08 ENCOUNTER FOR FOLLOW-UP SURVEILLANCE OF HEAD AND NECK CANCER: Primary | ICD-10-CM

## 2024-04-30 DIAGNOSIS — M54.17 LUMBOSACRAL RADICULOPATHY: Chronic | ICD-10-CM

## 2024-04-30 DIAGNOSIS — M51.37 DDD (DEGENERATIVE DISC DISEASE), LUMBOSACRAL: Primary | Chronic | ICD-10-CM

## 2024-04-30 DIAGNOSIS — C01 SQUAMOUS CELL CARCINOMA OF BASE OF TONGUE: ICD-10-CM

## 2024-04-30 DIAGNOSIS — Z85.89 ENCOUNTER FOR FOLLOW-UP SURVEILLANCE OF HEAD AND NECK CANCER: Primary | ICD-10-CM

## 2024-04-30 DIAGNOSIS — I10 PRIMARY HYPERTENSION: Chronic | ICD-10-CM

## 2024-04-30 PROCEDURE — 1160F RVW MEDS BY RX/DR IN RCRD: CPT | Mod: CPTII,,, | Performed by: NURSE PRACTITIONER

## 2024-04-30 PROCEDURE — 99213 OFFICE O/P EST LOW 20 MIN: CPT | Mod: PBBFAC,27 | Performed by: STUDENT IN AN ORGANIZED HEALTH CARE EDUCATION/TRAINING PROGRAM

## 2024-04-30 PROCEDURE — 1159F MED LIST DOCD IN RCRD: CPT | Mod: CPTII,,, | Performed by: NURSE PRACTITIONER

## 2024-04-30 PROCEDURE — 3008F BODY MASS INDEX DOCD: CPT | Mod: CPTII,,, | Performed by: NURSE PRACTITIONER

## 2024-04-30 PROCEDURE — 99214 OFFICE O/P EST MOD 30 MIN: CPT | Mod: PBBFAC | Performed by: NURSE PRACTITIONER

## 2024-04-30 PROCEDURE — 3075F SYST BP GE 130 - 139MM HG: CPT | Mod: CPTII,,, | Performed by: NURSE PRACTITIONER

## 2024-04-30 PROCEDURE — 99214 OFFICE O/P EST MOD 30 MIN: CPT | Mod: S$PBB,,, | Performed by: NURSE PRACTITIONER

## 2024-04-30 PROCEDURE — 3078F DIAST BP <80 MM HG: CPT | Mod: CPTII,,, | Performed by: NURSE PRACTITIONER

## 2024-04-30 RX ORDER — TIZANIDINE 4 MG/1
4 TABLET ORAL EVERY 8 HOURS PRN
Qty: 60 TABLET | Refills: 3 | Status: SHIPPED | OUTPATIENT
Start: 2024-04-30

## 2024-04-30 RX ORDER — NABUMETONE 750 MG/1
750 TABLET, FILM COATED ORAL 2 TIMES DAILY PRN
Qty: 60 TABLET | Refills: 3 | Status: SHIPPED | OUTPATIENT
Start: 2024-04-30

## 2024-04-30 RX ORDER — AMLODIPINE BESYLATE 10 MG/1
10 TABLET ORAL DAILY
Qty: 90 TABLET | Refills: 1 | Status: SHIPPED | OUTPATIENT
Start: 2024-04-30

## 2024-04-30 NOTE — PROGRESS NOTES
[facility]  Otolaryngology Clinic Note    Yaron Brennan  Encounter Date: 4/30/2024  YOB: 1965  Physician: Erick Harvey    Chief Complaint: Otalgia, neck mass    HPI: Yaron Brennan is a 59 y.o. male with HTN who presents as referral from Dr. Welsh for squamous cell carcinoma of base of tongue. Patient reports that toward the beginning of the year he started to have worsening left otalgia. He then noticed a bump/swelling on left neck that he thought was was due to a bug bite. This got progressively bigger over time and he then noticed some swelling on right neck as well. He eventually presented to [facility] in early July 2022. He had a CT neck that showed an ulcerated mass in the tongue base, and underwent direct laryngoscopy with ENT on 7/6/22 (Dr. Tay). Operative findings: ulcerated friable mass base of the tongue that seems confined to base of tongue; lingual surface of epiglottis is free from tumor; mass involves bilateral base of tongue, significantly midline. Results returned positive for p16+ SCCa. Today in clinic patient reports bilateral L>R otalgia, mild dysphagia/odynophagia. He reports he eats basically whatever he wants without overt signs of aspiration. He does report 40lb weight loss in the last 6 months. + Neck LAD. Reports voice changes. Denies any issues breathing. Smoked 1ppd since 17 years old (42 years) but quit a month ago when he got his diagnosis. Also previously drank 12 pack of beer a day and likewise quite a month ago. Referred to ENT for consideration of possible surgical resection.     07/05/2022: CT soft tissue of the neck without contrast:  -ulcerated tongue base mass, at least 3.4 cm by 3.0 cm x 3.1 cm; associated bulky level 2 and level 3 cervical lymphadenopathy; suspected level 1 and level 4 cervical lymphadenopathy; a right level 2 lymph node with central necrosis 2.6 x 3.3 cm; a left level 3 lymph node 4.5 x 3.2  cm; no significant airway compromise; no acute or aggressive bony lesions  -malignant ulcerated type mass with lymph node metastasis; no airway compromise; right upper lung lobe indistinct ground-glass pulmonary nodules typical of benign infectious or inflammatory etiology    8/16/22: Here today for follow up. Had dental extractions performed on 8/5. Not having any issues. Taking in 4-5 ensures daily. Trying to take some other soft foods like grits and pudding.     9/22/22: Here for follow up. Patient is undergoing CRT and tolerating it well. He reports he has one more chemotherapy session left and about 2-3 weeks of radiation left. Overall maintaining his weight. No progressive dysphagia, swallowing fine. Also no breathing issues or other complaints.    11/3/22: Patient is here today for follow up.  He finished CRT on 10/10.  Patient states he is still in some pain in  his neck and inside of his mouth.   He is tolerating p.o. intake but states at times he does have difficulty swallowing.  He does have xerostomia.  Patient has not used his eardrops to loosen the cerumen impaction.  Denies any shortness of breath or difficulty breathing/ changes to voice,  changes to his neck masses.    12/7/22:  Patient returns for surveillance today.  Patient denies any new dysphagia, odynophagia, H&N lumps or bumps, otalgia, weight loss.  Overall pain is getting better, tolerating diet, gaining weight.    03/14/2023 doing well, no pain, no otalgia, dysphagia has improved significantly, weight is stable.  No hemoptysis.  Voice remains hoarse.  No airway distress.    4/11/2023: Returns today for follow-up. Reports doing well overall. No dysphagia, swallowing all foods without issue. Voice stable. No odynophagia, no SOB, no otalgia. Does note feeling like ears are clogged, has tried ear drops without success. Did get TSH since last visit, wnl.    5/18/23: Patient here today for follow up. Doing well since last visit. No major  concerns. Denies weight loss, new lumps/bumps, sore throat, dysphagia, odynophagia, or otalgia. Having some pain when chewing. Does not have dentures.     6/13/23: Returns today for follow-up/surveillance. Patient reports doing well overall. No issues with dysphagia, odynophagia, voice changes, otalgia, or weight loss. Still looking for dentist to get dentures made. Did get CT neck since last visit, but CXR and TSH still pending.     7/19/23: Here for surveillance. He is doing well. He is swallowing food of all consistencies without issue - no coughing or choking. He denies voice changes, weight loss, pain, or new lumps/bumps. He is breathing comfortably and active. Having trouble with sleep at night.    8/16/23: He has been doing very well since his last visit no issues at all.  He has eating a regular diet and no issues swallowing.  No voice changes lumps or bumps in her neck.    9/19/23:  Here today for follow-up after CT.  No issues in the interim.  Dysphagia and odynophagia are stable: Able to eat most things except for tough meat.  Weight has been stable.  No noted lumps/bumps.    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  Patient has done well postoperatively.  He still has a mild sore throat but this is improving.  He has no respiratory distress or dysphagia.  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  There was no evidence of malignancy.  Results were discussed with the patient.  Patient has no new problems today.    December 19, 2023:   Patient presents today for routine surveillance.  Patient is doing well.  He is tolerating his diet.  His only complaint is that of some xerostomia but this is not a significant problem.  He has no complaints of sore throat, ear pain, swelling in the neck, or  difficulty swallowing.  He has no other new problems today.    2024:   Patient presents today for routine follow-up.  Patient is doing well.  He had been seen by Dr. Leavitt, radiation Oncology, last week who found the patient to be without evident disease.  Patient continues to do well.  He has no complaints of sore throat, ear pain, difficulty swallowing, or swelling in the neck.  No hemoptysis.  Appetite has been good.  He has no other new problems today.    24: here today for follow up. No new issues. Denies sore throat, otalgia, dysphagia, or neck lumps/bumps. Has been maintaining weight with good appetite.     2024:  Here today for follow up.  No new issues.  Denies sore throat, otalgia, weight loss, or new lumps or bumps in his neck. Weight stable.     Review of patient's allergies indicates:  No Known Allergies    Past Medical History:   Diagnosis Date    Cancer     Hypertension        Past Surgical History:   Procedure Laterality Date    BACK SURGERY      COLONOSCOPY  10/11/2023    DIRECT LARYNGOSCOPY N/A 2022    Procedure: LARYNGOSCOPY, DIRECT;  Surgeon: Mekhi Grossman MD;  Location: [facility];  Service: ENT;  Laterality: N/A;    DIRECT LARYNGOSCOPY N/A 10/02/2023    Procedure: LARYNGOSCOPY, DIRECT;  Surgeon: Mekhi Grossman MD;  Location: [facility];  Service: ENT;  Laterality: N/A;    EXTRACTION OF TOOTH N/A 2022    Procedure: EXTRACTION, TEETH;  Surgeon: Mekhi Grossman MD;  Location: [facility];  Service: ENT;  Laterality: N/A;    SPINE SURGERY      Not sure of date maybe in        Social History     Socioeconomic History    Marital status:      Spouse name: Blanca   Occupational History    Occupation: Disabled   Tobacco Use    Smoking status: Former     Current packs/day: 0.00     Types: Cigarettes     Start date: 1982     Quit date: 2022     Years since quittin.8    Smokeless tobacco: Never    Tobacco comments:     Been almost 35 yr   Substance  and Sexual Activity    Alcohol use: Not Currently    Drug use: Yes     Types: Marijuana     Comment: Daily    Sexual activity: Yes     Partners: Female     Birth control/protection: None     Social Determinants of Health     Financial Resource Strain: High Risk (1/1/2024)    Overall Financial Resource Strain (CARDIA)     Difficulty of Paying Living Expenses: Very hard   Food Insecurity: No Food Insecurity (1/1/2024)    Hunger Vital Sign     Worried About Running Out of Food in the Last Year: Never true     Ran Out of Food in the Last Year: Never true   Transportation Needs: No Transportation Needs (1/1/2024)    PRAPARE - Transportation     Lack of Transportation (Medical): No     Lack of Transportation (Non-Medical): No   Physical Activity: Insufficiently Active (1/1/2024)    Exercise Vital Sign     Days of Exercise per Week: 4 days     Minutes of Exercise per Session: 30 min   Stress: No Stress Concern Present (1/1/2024)    [place] [employer] - Occupational Stress Questionnaire     Feeling of Stress : Only a little   Social Connections: Unknown (1/1/2024)    Social Connection and Isolation Panel [NHANES]     Frequency of Communication with Friends and Family: More than three times a week     Frequency of Social Gatherings with Friends and Family: Twice a week     Active Member of Clubs or Organizations: Yes     Attends Club or Organization Meetings: More than 4 times per year     Marital Status:    Housing Stability: Patient Declined (1/1/2024)    Housing Stability Vital Sign     Unable to Pay for Housing in the Last Year: Patient declined     Unstable Housing in the Last Year: Patient declined       Family History   Problem Relation Name Age of Onset    Hypertension Mother Chayo Mary     Cancer Father Yaron Javier Mcgee     Hypertension Sister Martha     Hypertension Brother Kevin        Outpatient Encounter Medications as of 4/30/2024   Medication Sig Dispense Refill     amLODIPine (NORVASC) 10 MG tablet Take 1 tablet (10 mg total) by mouth once daily. 90 tablet 1    melatonin (MELATIN) 3 mg tablet Take 2 tablets (6 mg total) by mouth nightly as needed for Insomnia. 30 tablet 0    nabumetone (RELAFEN) 750 MG tablet Take 1 tablet (750 mg total) by mouth 2 (two) times daily as needed for Pain. 60 tablet 3    ondansetron (ZOFRAN-ODT) 4 MG TbDL Take 1 tablet (4 mg total) by mouth every 8 (eight) hours as needed (nausea). 15 tablet 0    tiZANidine (ZANAFLEX) 4 MG tablet Take 1 tablet (4 mg total) by mouth every 8 (eight) hours as needed (muscle pain or spasm). 60 tablet 3    traZODone (DESYREL) 100 MG tablet Take 1 tablet (100 mg total) by mouth every evening. 30 tablet 6    [DISCONTINUED] amLODIPine (NORVASC) 10 MG tablet Take 1 tablet (10 mg total) by mouth once daily. 90 tablet 3    [DISCONTINUED] amLODIPine (NORVASC) 10 MG tablet Take 1 tablet (10 mg total) by mouth once daily. 90 tablet 0    [DISCONTINUED] nabumetone (RELAFEN) 750 MG tablet Take 1 tablet (750 mg total) by mouth 2 (two) times daily as needed for Pain. (Patient not taking: Reported on 2/7/2024) 60 tablet 1    [DISCONTINUED] predniSONE (DELTASONE) 20 MG tablet One tablet orally twice daily for 3 days and then once daily for 2 days (Patient not taking: Reported on 2/21/2024) 8 tablet 0    [DISCONTINUED] tiZANidine (ZANAFLEX) 4 MG tablet Take 1 tablet (4 mg total) by mouth every 8 (eight) hours as needed (muscle pain or spasm). 60 tablet 1     Facility-Administered Encounter Medications as of 4/30/2024   Medication Dose Route Frequency Provider Last Rate Last Admin    lactated ringers infusion   Intravenous Continuous Elzbieta Quesada MD 10 mL/hr at 08/05/22 0808 New Bag at 08/05/22 0808    LIDOcaine (PF) 10 mg/ml (1%) injection 10 mg  1 mL Intradermal Once Yajaira Watts FNP        LIDOcaine (PF) 10 mg/ml (1%) injection 10 mg  1 mL Intradermal Once Yajaira Watts, FNP        LIDOcaine (PF) 40 mg/mL  "(4 %) injection 1 mL  1 mL Other 1 time in Clinic/HOD Mekhi Grossman MD        phenylephrine HCL 1% nasal spray 1 spray  1 spray Each Nostril 1 time in Clinic/HOD Mekhi Grossman MD        sodium chloride 0.9% flush 10 mL  10 mL Intravenous PRN nAna Taylor MD        sodium chloride 0.9% flush 10 mL  10 mL Intravenous PRN Jose Johns MD           Physical Exam:  /74 (BP Location: Left arm, Patient Position: Sitting, BP Method: Small (Automatic))   Pulse 83   Resp 18   Ht 5' 10" (1.778 m)   Wt 80.7 kg (178 lb)   BMI 25.54 kg/m²     General:  Well-developed well-nourished male in no acute distress.  Voice is normal.  Head and face:  Normocephalic.  No facial lesions.  No temporomandibular joint tenderness or click.  Ears:  Right ear-auricle is normally developed.  External auditory canal is clear.  Tympanic membrane is nonerythematous.  No middle ear effusion.  Left ear-auricle is normally developed.  External auditory canal is clear.  Tympanic membrane is nonerythematous.  No middle ear effusion.  Nose:  Nasal dorsum is unremarkable.  No significant septal deviation.  No significant intranasal congestion.  Secretions are clear.  Oral cavity and oropharynx:  Tongue and floor mouth are without lesions.  Mucosa is moist.  No pharyngeal erythema or exudates.  No oropharyngeal masses.  No tonsillar hypertrophy.     Neck:  Supple without adenopathy or thyromegaly.  Trachea is in the midline.  Parotid and submandibular glands are normal to palpation without masses or tenderness.  Eyes:  Extraocular muscles intact.  No nystagmus.  No exophthalmos or enophthalmos.  Neurologic:  Alert and oriented.  Cranial nerves 2-12 are grossly normal.    PROCEDURE: Flexible fiberoptic laryngoscopy    Surgeon: Erick Harvey   Anesthesia: None  Complications: None  Date: 04/30/2024  Indication: hx of cancer    Procedure in Detail:    Informed consent was obtained from the patient after explanation of procedure, " indications, risks and benefits. Flexible laryngoscopy was performed on Yaron Brennan through the right nasal passage(s). The nasal cavity, nasopharynx, oropharynx, hypopharynx and larynx were adequately visualized. The true vocal cords and arytenoids were examined during phonation and repose.    Operative Findings:    Nasal Cavity:  nasal airway patent without lesions or ulcerations  Nasopharynx: No adenoid hypertrophy, no masses or ulcerations noted in the fossae of Rosenmüller, patent appearing mariel tubarii  Tongue Base: No fullness or lingual tonsillar hypertrophy. No masses.  Pharyngeal Walls: No lesions or ulcerations noted  Epiglottis / Aryepiglottic Folds: Crisp epiglottis without lesions, normal-appearing aryepiglottic folds without lesions.   Arytenoids: Full symmetric range of motion bilaterally, no lesions  Piriform Sinus: No lesions or masses noted  Vocal Cords: Symmetric movement bilaterally with good glottic closure, patent airway during inspiration and expiration, no lesions or masses      The patient tolerated the procedure well without any complications.    Impression: Radiation changes with no identifiable malignancy.     Pertinent Data:    CT soft tissue neck (2/22/24): I have personally reviewed this image. No new masses or lesions. No concerning lymphadenopathy. No prominence of base of tongue. No evidence of disease.       Assessment:  Yaron Brennan is a 59 y.o. 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 CRT, finished 10/10/22.  Most recent CT (2/22/24) without evidence of disease. PET 1/11/23 without disease (in [facility] system), TSH wnl 7/20/23.     IVAN today.     Plan:   -  Next surveillance CT due in October 2024 for the 2 year surveillance paul. Order at next visit.   - Recent TSH, T4 WNL.   - Follow-up in 3 months for routine surveillance.    Erick Harvey MD  [facility] Otolaryngology  PGY-3  04/30/2024 12:50 PM      HEAD & NECK CANCER SURVEILLANCE   Radiation and/or Chemotherapy     Medical Oncologist: Kevon Welsh MD  Radiation Oncologist: Unknown    Primary tumor: cT3 cN2 M0 p16+ base of tongue    Date of Diagnosis: 7/6/22  Therapy: Cisplatin 100 mg/m² IV days 1, 22, and 23, concurrent with RT  Completion Date: 10/10/22    Pertinent Treatment History:   First presentation 7/2022  CT neck 7/5/22 & 7/29/22  Panendoscopy 7/6/22  Right UE DVT 2/2 PICC 9/15/22 on Xarelto  CRT 8/25/22 - 10/10/22  Direct laryngoscopy with biopsy of benign laryngeal ductal cyst on October 2, 2023.    Place date if completed   3 6 12 18 24 36 48 60   CT Neck 1/31/23 6/8/23 8/25/23 2/22/24 X X X X   PET/CT 1/11/23          CXR  7/17/23 X X X X X X   TFT 2/2/23 7/20/23 X  X X X X     Current Surveillance Interval: post-tx year 1-2, q2-3 mo

## 2024-04-30 NOTE — PROGRESS NOTES
Bia Tucker, SANDY   [facility]  [address] [facility] [address]      PATIENT NAME: Yaron Brennan  : 1965  DATE: 24  MRN: [id number]      Reason for Visit / Chief Complaint: Follow-up (Pt complained of lower back pain, pt states that he went to PT but it didn't really help. Pt states every now and then his finger tips on both hands feel numb)       History of Present Illness / Problem Focused Workflow     Yaron Brennan is a 59 y.o. Black or  male presents to the clinic for HTN and LBP f/u. 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 Followed by [facility] oncology and ENT clinics.     Today, pt reports he completed PT with minimal improvement. Continues to report radiating LBP that shoots down both legs which is worse with prolonged standing/sitting and walking. Also reports some intermittent numbness and tingling to finger tips. Continues to perform HEP as instructed. Pt reports hx of lumbar fusion in the past. Is requesting refill on meds as they provide some relief. Continues to f/u with oncology and ENT clinics as scheduled. Appetite remains good. BP at goal. Trazodone remains effective when taken. Has continued ETOH and tobacco cessation since 2022. Continues to smoke marijuana daily. Denies chest pain, shortness of breath, cough, fever, headache, dizziness, weakness, abdominal pain, nausea, vomiting, diarrhea, constipation, dysuria, depression, anxiety, SI/HI.    Review of Systems     Review of Systems     See HPI for details    Constitutional: Denies Change in appetite. Denies Chills. Denies Fever. Denies Night sweats.  Eye: Denies Blurred vision. Denies Discharge. Denies Eye pain.  ENT: Denies Decreased hearing. Denies Sore throat. Denies Swollen glands.  Respiratory: Denies  Cough. Denies Shortness of breath. Denies Shortness of breath with exertion. Denies Wheezing.  Cardiovascular: DeniesChest pain at rest. Denies Chest pain with exertion. Denies Irregular heartbeat. Denies Palpitations. Denies Edema.  Gastrointestinal: Denies Abdominal pain. Denies Diarrhea. Denies Nausea. Denies Vomiting. Denies Hematemesis or Hematochezia.  Genitourinary: Denies Dysuria. Denies Urinary frequency. Denies Urinary urgency. Denies Blood in urine.  Endocrine: Denies Cold intolerance. Denies Excessive thirst. Denies Heat intolerance. Denies Weight loss. Denies Weight gain.  Musculoskeletal: Admits Painful joints. Denies Weakness.  Integumentary: Denies Rash. Denies Itching. Denies Dry skin.  Neurologic: Denies Dizziness. Denies Fainting. Denies Headache.  Psychiatric: Denies Depression. Denies Anxiety. Denies Suicidal/Homicidal ideations.    All Other ROS: Negative except as stated in HPI.     Medical / Surgical / Social / Family History       -------------------------------------    Cancer    Hypertension        Past Surgical History:   Procedure Laterality Date    BACK SURGERY      COLONOSCOPY  10/11/2023    DIRECT LARYNGOSCOPY N/A 08/05/2022    Procedure: LARYNGOSCOPY, DIRECT;  Surgeon: Mekhi Grossman MD;  Location: [facility];  Service: ENT;  Laterality: N/A;    DIRECT LARYNGOSCOPY N/A 10/02/2023    Procedure: LARYNGOSCOPY, DIRECT;  Surgeon: Mekhi Grossman MD;  Location: [facility];  Service: ENT;  Laterality: N/A;    EXTRACTION OF TOOTH N/A 08/05/2022    Procedure: EXTRACTION, TEETH;  Surgeon: Mekhi Grossman MD;  Location: [facility];  Service: ENT;  Laterality: N/A;    SPINE SURGERY  2000    Not sure of date maybe in 2000       Social History     Socioeconomic History    Marital status:      Spouse name: Blanca   Occupational History    Occupation: Disabled   Tobacco Use    Smoking status: Former     Current packs/day: 0.00     Types: Cigarettes     Start date: 1/1/1982     Quit date:  2022     Years since quittin.8    Smokeless tobacco: Never    Tobacco comments:     Been almost 35 yr   Substance and Sexual Activity    Alcohol use: Not Currently    Drug use: Yes     Types: Marijuana     Comment: Daily    Sexual activity: Yes     Partners: Female     Birth control/protection: None     Social Determinants of Health     Financial Resource Strain: High Risk (2024)    Overall Financial Resource Strain (CARDIA)     Difficulty of Paying Living Expenses: Very hard   Food Insecurity: No Food Insecurity (2024)    Hunger Vital Sign     Worried About Running Out of Food in the Last Year: Never true     Ran Out of Food in the Last Year: Never true   Transportation Needs: No Transportation Needs (2024)    PRAPARE - Transportation     Lack of Transportation (Medical): No     Lack of Transportation (Non-Medical): No   Physical Activity: Insufficiently Active (2024)    Exercise Vital Sign     Days of Exercise per Week: 4 days     Minutes of Exercise per Session: 30 min   Stress: No Stress Concern Present (2024)    [employer] [place] [employer] - Occupational Stress Questionnaire     Feeling of Stress : Only a little   Social Connections: Unknown (2024)    Social Connection and Isolation Panel [NHANES]     Frequency of Communication with Friends and Family: More than three times a week     Frequency of Social Gatherings with Friends and Family: Twice a week     Active Member of Clubs or Organizations: Yes     Attends Club or Organization Meetings: More than 4 times per year     Marital Status:    Housing Stability: Patient Declined (2024)    Housing Stability Vital Sign     Unable to Pay for Housing in the Last Year: Patient declined     Unstable Housing in the Last Year: Patient declined        Family History   Problem Relation Name Age of Onset    Hypertension Mother Chayo Mary     Cancer Father Yaron Herrera      Hypertension Sister Martha      "Hypertension Brother Kevin         Medications and Allergies     Medications  Current Outpatient Medications   Medication Instructions    amLODIPine (NORVASC) 10 mg, Oral, Daily    melatonin (MELATIN) 6 mg, Oral, Nightly PRN    nabumetone (RELAFEN) 750 mg, Oral, 2 times daily PRN    ondansetron (ZOFRAN-ODT) 4 mg, Oral, Every 8 hours PRN    tiZANidine (ZANAFLEX) 4 mg, Oral, Every 8 hours PRN    traZODone (DESYREL) 100 mg, Oral, Nightly         Allergies  Review of patient's allergies indicates:  No Known Allergies    Physical Examination     /77 (BP Location: Left arm, Patient Position: Sitting, BP Method: Large (Automatic))   Pulse 90   Temp 98 °F (36.7 °C) (Oral)   Ht 5' 10.98" (1.803 m)   Wt 81.4 kg (179 lb 6.4 oz)   SpO2 100%   BMI 25.04 kg/m²     Physical Exam  Musculoskeletal:      Lumbar back: Spasms and tenderness present. Decreased range of motion.        Back:          General: Alert and oriented, No acute distress.  Head: Normocephalic, Atraumatic.  Eye: Pupils are equal, round and reactive to light, Extraocular movements are intact, Sclera non-icteric.  Ears/Nose/Throat: Normal, Mucosa moist,Clear.  Neck/Thyroid: Supple, Non-tender, No carotid bruit, No lymphadenopathy, No JVD, Full range of motion.  Respiratory: Clear to auscultation bilaterally; No wheezes, rales or rhonchi,Non-labored respirations, Symmetrical chest wall expansion.  Cardiovascular: Regular rate and rhythm, S1/S2 normal, No murmurs, rubs or gallops.  Gastrointestinal: Soft, Non-tender, Non-distended, Normal bowel sounds, No palpable organomegaly.  Integumentary: Warm, Dry, Intact, No suspicious lesions or rashes.  Extremities: No clubbing, cyanosis or edema  Neurologic: No focal deficits, Cranial Nerves II-XII are grossly intact, Motor strength normal upper and lower extremities, Sensory exam intact.  Psychiatric: Normal interaction, Coherent speech, Appropriate affect       Results     Lab Results   Component Value Date    " WBC 4.81 03/18/2024    HGB 13.3 (L) 03/18/2024    HCT 39.9 (L) 03/18/2024     03/18/2024    CHOL 203 (H) 12/01/2023    TRIG 66 12/01/2023    ALT 9 03/18/2024    AST 16 03/18/2024     03/18/2024    K 3.7 03/18/2024    CREATININE 1.12 03/18/2024    BUN 10.1 03/18/2024    CO2 25 03/18/2024    TSH 2.347 03/18/2024    PSA 3.01 12/01/2023    HGBA1C 5.4 12/01/2023     X-Ray Lumbar Spine 2 Or 3 Views  Order: [id number]  Status: Final result       Visible to patient: Yes (seen)       Next appt: Today at 02:00 PM in Otolaryngology (RESIDENT 1, [facility] OTORHINOLARYNGOLOGY)       Dx: Acute midline low back pain without s...    0 Result Notes       1 Patient Communication  Details    Reading Physician Reading Date Result Priority   Trip De La Cruz MD  [phone] 12/1/2023 Routine     Narrative & Impression  EXAMINATION:  XR LUMBAR SPINE 2 OR 3 VIEWS     CLINICAL HISTORY:  Low back pain, unspecified     COMPARISON:  None     FINDINGS:  There are degenerative changes most pronounced at L4-5 and L5-S1.  The alignment is within normal limits no fractures are seen.     Impression:     Degenerative changes most pronounced at L4-5 and L5-S1.        Electronically signed by:Trip De La Cruz MD  Date:                                            12/01/2023  Time:                                           13:31       Assessment and Plan (including Health Maintenance)     Plan:     1. Primary hypertension  At goal.  Continue amlodipine.  Follow a low sodium (less than 2 grams of sodium per day), DASH diet.   Continue medications as prescribed.  Monitor blood pressure and report any consistent values greater than 140/90 and keep a log.  Encouraged continued smoking cessation to aid in BP reduction and co-morbidities.   Maintain healthy weight with a BMI goal of <30.   Aerobic exercise for 150 minutes per week (or 5 days a week for 30 minutes each day).    - amLODIPine (NORVASC) 10 MG tablet; Take 1 tablet (10 mg total) by mouth once  daily.  Dispense: 90 tablet; Refill: 1    2. DDD (degenerative disc disease), lumbosacral  Has completed PT with minimal improvement.  Has continued HEP as instructed.  Refilled tizanidine and relafen prn.  MRI lumbar ordered.  Perform back stretches daily.   Avoid activities than increase back pain or stiffness.   Apply heating pad, ice pack, and BioFreeze as needed; alternate every 15-20 minutes.   Massage back to loosen muscles.   Continue tylenol arthritis/NSAID/muscle relaxer as needed.    - nabumetone (RELAFEN) 750 MG tablet; Take 1 tablet (750 mg total) by mouth 2 (two) times daily as needed for Pain.  Dispense: 60 tablet; Refill: 3  - tiZANidine (ZANAFLEX) 4 MG tablet; Take 1 tablet (4 mg total) by mouth every 8 (eight) hours as needed (muscle pain or spasm).  Dispense: 60 tablet; Refill: 3  - MRI Lumbar Spine Without Contrast; Future    3. Lumbosacral radiculopathy  Has completed PT with minimal improvement.  Has continued HEP as instructed.  Refilled tizanidine and relafen prn.  MRI lumbar ordered.  Perform back stretches daily.   Avoid activities than increase back pain or stiffness.   Apply heating pad, ice pack, and BioFreeze as needed; alternate every 15-20 minutes.   Massage back to loosen muscles.   Continue tylenol arthritis/NSAID/muscle relaxer as needed.  - MRI Lumbar Spine Without Contrast; Future    4. Drug-induced insomnia  Continue trazodone.  Avoid caffeine, alcohol and stimulants.  Do not use illicit drugs.  Practice positive phrases and repeat throughout the day, yoga, lavender scents or Chamomile tea to promote relaxation.  Set healthy boundaries, avoid people and conversations that increase stress.  Power down electronic devices at least one hour prior to bedtime.  Keep room dark; use eye mask or relaxation sound machine to promote rest.  Sleep hygiene refers to actions that tend to improve and maintain good sleep:  Sleep as long as necessary to feel rested (usually seven to eight hours  for adults) and then get out of bed  Maintain a regular sleep schedule, particularly a regular wake-up time in the morning  Avoid smoking or other nicotine intake, particularly during the evening  Avoid daytime naps, especially if they are longer than 20 to 30 minutes or occur late in the day.          Problem List Items Addressed This Visit          Neuro    DDD (degenerative disc disease), lumbosacral - Primary (Chronic)    Relevant Medications    nabumetone (RELAFEN) 750 MG tablet    tiZANidine (ZANAFLEX) 4 MG tablet    Other Relevant Orders    MRI Lumbar Spine Without Contrast    Lumbosacral radiculopathy (Chronic)       Psychiatric    Drug-induced insomnia (Chronic)       Cardiac/Vascular    Primary hypertension (Chronic)    Relevant Medications    amLODIPine (NORVASC) 10 MG tablet     Other Visit Diagnoses       Lumbar radiculopathy, chronic        Relevant Orders    MRI Lumbar Spine Without Contrast              Health Maintenance Due   Topic Date Due    COVID-19 Vaccine (1) Never done     Will notify of MRI results.  Follow up in about 6 months (around 10/30/2024) for Follow up.        Signature:  SANDY Brandt  [facility]  [address] [facility] [address]

## 2024-05-01 NOTE — PROGRESS NOTES
I have reviewed and agree with the resident's findings, including all diagnostic interpretations and plans as written.     Mekhi Grossman M.D.

## 2024-05-17 ENCOUNTER — HOSPITAL ENCOUNTER (OUTPATIENT)
Dept: RADIOLOGY | Facility: HOSPITAL | Age: 59
Discharge: HOME | End: 2024-05-17
Attending: NURSE PRACTITIONER
Payer: MEDICAID

## 2024-05-17 DIAGNOSIS — M51.37 DDD (DEGENERATIVE DISC DISEASE), LUMBOSACRAL: Chronic | ICD-10-CM

## 2024-05-17 DIAGNOSIS — M54.16 LUMBAR RADICULOPATHY, CHRONIC: ICD-10-CM

## 2024-05-17 PROCEDURE — 72148 MRI LUMBAR SPINE W/O DYE: CPT | Mod: TC

## 2024-05-22 DIAGNOSIS — M51.37 DDD (DEGENERATIVE DISC DISEASE), LUMBOSACRAL: Primary | ICD-10-CM

## 2024-05-22 DIAGNOSIS — M47.816 LUMBAR SPONDYLOSIS: ICD-10-CM

## 2024-06-03 PROBLEM — Z85.89 ENCOUNTER FOR FOLLOW-UP SURVEILLANCE OF HEAD AND NECK CANCER: Status: RESOLVED | Noted: 2024-02-29 | Resolved: 2024-06-03

## 2024-06-03 PROBLEM — Z08 ENCOUNTER FOR FOLLOW-UP SURVEILLANCE OF HEAD AND NECK CANCER: Status: RESOLVED | Noted: 2024-02-29 | Resolved: 2024-06-03

## 2024-07-15 DIAGNOSIS — I10 PRIMARY HYPERTENSION: Chronic | ICD-10-CM

## 2024-07-15 DIAGNOSIS — M51.37 DDD (DEGENERATIVE DISC DISEASE), LUMBOSACRAL: Chronic | ICD-10-CM

## 2024-07-15 RX ORDER — AMLODIPINE BESYLATE 10 MG/1
10 TABLET ORAL DAILY
Qty: 90 TABLET | Refills: 0 | Status: SHIPPED | OUTPATIENT
Start: 2024-07-15

## 2024-07-15 RX ORDER — NABUMETONE 750 MG/1
750 TABLET, FILM COATED ORAL 2 TIMES DAILY PRN
Qty: 60 TABLET | Refills: 1 | Status: SHIPPED | OUTPATIENT
Start: 2024-07-15

## 2024-07-15 RX ORDER — TIZANIDINE 4 MG/1
4 TABLET ORAL EVERY 8 HOURS PRN
Qty: 60 TABLET | Refills: 1 | Status: SHIPPED | OUTPATIENT
Start: 2024-07-15

## 2024-07-31 ENCOUNTER — OFFICE VISIT (OUTPATIENT)
Dept: OTOLARYNGOLOGY | Facility: CLINIC | Age: 59
End: 2024-07-31
Payer: MEDICAID

## 2024-07-31 VITALS
TEMPERATURE: 97 F | RESPIRATION RATE: 18 BRPM | BODY MASS INDEX: 25.47 KG/M2 | HEIGHT: 70 IN | WEIGHT: 177.94 LBS | HEART RATE: 71 BPM | OXYGEN SATURATION: 100 % | DIASTOLIC BLOOD PRESSURE: 87 MMHG | SYSTOLIC BLOOD PRESSURE: 138 MMHG

## 2024-07-31 DIAGNOSIS — C01 SQUAMOUS CELL CARCINOMA OF BASE OF TONGUE: Primary | ICD-10-CM

## 2024-07-31 PROCEDURE — 99213 OFFICE O/P EST LOW 20 MIN: CPT | Mod: PBBFAC | Performed by: STUDENT IN AN ORGANIZED HEALTH CARE EDUCATION/TRAINING PROGRAM

## 2024-07-31 RX ORDER — LIDOCAINE HYDROCHLORIDE 40 MG/ML
SOLUTION TOPICAL
Status: DISCONTINUED
Start: 2024-07-31 | End: 2024-07-31 | Stop reason: HOSPADM

## 2024-07-31 NOTE — PROGRESS NOTES
The scope used for the exam was:  Flexible scope ENF-P4  Serial Number:  1)    [id number]    []   2)    [id number]    []   3)    [id number]    []   4)    [id number]    []   5)    [id number]    []   6)    [id number]    [x]       The scope used for the exam was:  Rigid scope   Serial Number:  1)   [id number]    []   2)   [id number]    []   3)   7330    []   4)    3384   []   5)    0824   []   6)    5554   []     7)   7425    []   8)   2240    []   9)   1109    []

## 2024-07-31 NOTE — PROGRESS NOTES
[facility] and [facility]  Otolaryngology Clinic Note    Yaron Brennan  Encounter Date: 7/31/2024  YOB: 1965  Physician: Kareem Schmitt    Chief Complaint: Otalgia, neck mass    HPI: Yaron Brennan is a 59 y.o. male with HTN who presents as referral from Dr. Welsh for squamous cell carcinoma of base of tongue. Patient reports that toward the beginning of the year he started to have worsening left otalgia. He then noticed a bump/swelling on left neck that he thought was was due to a bug bite. This got progressively bigger over time and he then noticed some swelling on right neck as well. He eventually presented to [facility] in early July 2022. He had a CT neck that showed an ulcerated mass in the tongue base, and underwent direct laryngoscopy with ENT on 7/6/22 (Dr. Tay). Operative findings: ulcerated friable mass base of the tongue that seems confined to base of tongue; lingual surface of epiglottis is free from tumor; mass involves bilateral base of tongue, significantly midline. Results returned positive for p16+ SCCa. Today in clinic patient reports bilateral L>R otalgia, mild dysphagia/odynophagia. He reports he eats basically whatever he wants without overt signs of aspiration. He does report 40lb weight loss in the last 6 months. + Neck LAD. Reports voice changes. Denies any issues breathing. Smoked 1ppd since 17 years old (42 years) but quit a month ago when he got his diagnosis. Also previously drank 12 pack of beer a day and likewise quite a month ago. Referred to ENT for consideration of possible surgical resection.     07/05/2022: CT soft tissue of the neck without contrast:  -ulcerated tongue base mass, at least 3.4 cm by 3.0 cm x 3.1 cm; associated bulky level 2 and level 3 cervical lymphadenopathy; suspected level 1 and level 4 cervical lymphadenopathy; a right level 2 lymph node with central necrosis 2.6 x 3.3 cm; a left level 3 lymph node 4.5 x 3.2 cm; no  significant airway compromise; no acute or aggressive bony lesions  -malignant ulcerated type mass with lymph node metastasis; no airway compromise; right upper lung lobe indistinct ground-glass pulmonary nodules typical of benign infectious or inflammatory etiology    8/16/22: Here today for follow up. Had dental extractions performed on 8/5. Not having any issues. Taking in 4-5 ensures daily. Trying to take some other soft foods like grits and pudding.     9/22/22: Here for follow up. Patient is undergoing CRT and tolerating it well. He reports he has one more chemotherapy session left and about 2-3 weeks of radiation left. Overall maintaining his weight. No progressive dysphagia, swallowing fine. Also no breathing issues or other complaints.    11/3/22: Patient is here today for follow up.  He finished CRT on 10/10.  Patient states he is still in some pain in  his neck and inside of his mouth.   He is tolerating p.o. intake but states at times he does have difficulty swallowing.  He does have xerostomia.  Patient has not used his eardrops to loosen the cerumen impaction.  Denies any shortness of breath or difficulty breathing/ changes to voice,  changes to his neck masses.    12/7/22:  Patient returns for surveillance today.  Patient denies any new dysphagia, odynophagia, H&N lumps or bumps, otalgia, weight loss.  Overall pain is getting better, tolerating diet, gaining weight.    03/14/2023 doing well, no pain, no otalgia, dysphagia has improved significantly, weight is stable.  No hemoptysis.  Voice remains hoarse.  No airway distress.    4/11/2023: Returns today for follow-up. Reports doing well overall. No dysphagia, swallowing all foods without issue. Voice stable. No odynophagia, no SOB, no otalgia. Does note feeling like ears are clogged, has tried ear drops without success. Did get TSH since last visit, wnl.    5/18/23: Patient here today for follow up. Doing well since last visit. No major concerns.  Denies weight loss, new lumps/bumps, sore throat, dysphagia, odynophagia, or otalgia. Having some pain when chewing. Does not have dentures.     6/13/23: Returns today for follow-up/surveillance. Patient reports doing well overall. No issues with dysphagia, odynophagia, voice changes, otalgia, or weight loss. Still looking for dentist to get dentures made. Did get CT neck since last visit, but CXR and TSH still pending.     7/19/23: Here for surveillance. He is doing well. He is swallowing food of all consistencies without issue - no coughing or choking. He denies voice changes, weight loss, pain, or new lumps/bumps. He is breathing comfortably and active. Having trouble with sleep at night.    8/16/23: He has been doing very well since his last visit no issues at all.  He has eating a regular diet and no issues swallowing.  No voice changes lumps or bumps in her neck.    9/19/23:  Here today for follow-up after CT.  No issues in the interim.  Dysphagia and odynophagia are stable: Able to eat most things except for tough meat.  Weight has been stable.  No noted lumps/bumps.    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  Patient has done well postoperatively.  He still has a mild sore throat but this is improving.  He has no respiratory distress or dysphagia.  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  There was no evidence of malignancy.  Results were discussed with the patient.  Patient has no new problems today.    December 19, 2023:   Patient presents today for routine surveillance.  Patient is doing well.  He is tolerating his diet.  His only complaint is that of some xerostomia but this is not a significant problem.  He has no complaints of sore throat, ear pain, swelling in the neck, or difficulty  swallowing.  He has no other new problems today.    2/8/2024:   Patient presents today for routine follow-up.  Patient is doing well.  He had been seen by Dr. Leavitt, radiation Oncology, last week who found the patient to be without evident disease.  Patient continues to do well.  He has no complaints of sore throat, ear pain, difficulty swallowing, or swelling in the neck.  No hemoptysis.  Appetite has been good.  He has no other new problems today.    2/29/24: here today for follow up. No new issues. Denies sore throat, otalgia, dysphagia, or neck lumps/bumps. Has been maintaining weight with good appetite.     4/30/2024:  Here today for follow up.  No new issues.  Denies sore throat, otalgia, weight loss, or new lumps or bumps in his neck. Weight stable.     7/31/24:  Here for follow-up.  Reports occasional hoarseness otherwise no odynophagia dysphagia otalgia lumps or bumps.  Changes in weight, doing well    Review of patient's allergies indicates:  No Known Allergies    Past Medical History:   Diagnosis Date    Cancer     Hypertension        Past Surgical History:   Procedure Laterality Date    BACK SURGERY      COLONOSCOPY  10/11/2023    DIRECT LARYNGOSCOPY N/A 08/05/2022    Procedure: LARYNGOSCOPY, DIRECT;  Surgeon: Mekhi Grossman MD;  Location: [facility];  Service: ENT;  Laterality: N/A;    DIRECT LARYNGOSCOPY N/A 10/02/2023    Procedure: LARYNGOSCOPY, DIRECT;  Surgeon: Mekhi Grossman MD;  Location: [facility] OR;  Service: ENT;  Laterality: N/A;    EXTRACTION OF TOOTH N/A 08/05/2022    Procedure: EXTRACTION, TEETH;  Surgeon: Mekhi Grossman MD;  Location: [facility] OR;  Service: ENT;  Laterality: N/A;    SPINE SURGERY  2000    Not sure of date maybe in 2000       Social History     Socioeconomic History    Marital status:      Spouse name: Blanca   Occupational History    Occupation: Disabled   Tobacco Use    Smoking status: Former     Current packs/day: 0.00     Types: Cigarettes     Start date:  1982     Quit date: 2022     Years since quittin.0    Smokeless tobacco: Never    Tobacco comments:     Been almost 35 yr   Substance and Sexual Activity    Alcohol use: Not Currently    Drug use: Yes     Types: Marijuana     Comment: Daily    Sexual activity: Yes     Partners: Female     Birth control/protection: None     Social Determinants of Health     Financial Resource Strain: High Risk (2024)    Overall Financial Resource Strain (CARDIA)     Difficulty of Paying Living Expenses: Very hard   Food Insecurity: No Food Insecurity (2024)    Hunger Vital Sign     Worried About Running Out of Food in the Last Year: Never true     Ran Out of Food in the Last Year: Never true   Transportation Needs: No Transportation Needs (2024)    PRAPARE - Transportation     Lack of Transportation (Medical): No     Lack of Transportation (Non-Medical): No   Physical Activity: Insufficiently Active (2024)    Exercise Vital Sign     Days of Exercise per Week: 4 days     Minutes of Exercise per Session: 30 min   Stress: No Stress Concern Present (2024)    [employer] - Occupational Stress Questionnaire     Feeling of Stress : Only a little   Housing Stability: Patient Declined (2024)    Housing Stability Vital Sign     Unable to Pay for Housing in the Last Year: Patient declined     Unstable Housing in the Last Year: Patient declined       Family History   Problem Relation Name Age of Onset    Hypertension Mother Chayo Mary     Cancer Father Yaron Herrera Sr     Hypertension Sister Martha     Hypertension Brother Kevin        Outpatient Encounter Medications as of 2024   Medication Sig Dispense Refill    amLODIPine (NORVASC) 10 MG tablet Take 1 tablet (10 mg total) by mouth once daily. 90 tablet 0    melatonin (MELATIN) 3 mg tablet Take 2 tablets (6 mg total) by mouth nightly as needed for Insomnia. 30 tablet 0    nabumetone (RELAFEN) 750 MG tablet Take 1  tablet (750 mg total) by mouth 2 (two) times daily as needed for Pain. 60 tablet 1    ondansetron (ZOFRAN-ODT) 4 MG TbDL Take 1 tablet (4 mg total) by mouth every 8 (eight) hours as needed (nausea). 15 tablet 0    tiZANidine (ZANAFLEX) 4 MG tablet Take 1 tablet (4 mg total) by mouth every 8 (eight) hours as needed (muscle pain or spasm). 60 tablet 1    traZODone (DESYREL) 100 MG tablet Take 1 tablet (100 mg total) by mouth every evening. 30 tablet 6    [DISCONTINUED] amLODIPine (NORVASC) 10 MG tablet Take 1 tablet (10 mg total) by mouth once daily. 90 tablet 1    [DISCONTINUED] nabumetone (RELAFEN) 750 MG tablet Take 1 tablet (750 mg total) by mouth 2 (two) times daily as needed for Pain. 60 tablet 3    [DISCONTINUED] tiZANidine (ZANAFLEX) 4 MG tablet Take 1 tablet (4 mg total) by mouth every 8 (eight) hours as needed (muscle pain or spasm). 60 tablet 3     Facility-Administered Encounter Medications as of 7/31/2024   Medication Dose Route Frequency Provider Last Rate Last Admin    lactated ringers infusion   Intravenous Continuous Elzbieta Quesada MD 10 mL/hr at 08/05/22 0808 New Bag at 08/05/22 0808    LIDOcaine (PF) 10 mg/ml (1%) injection 10 mg  1 mL Intradermal Once Yajaira Watts FNP        LIDOcaine (PF) 10 mg/ml (1%) injection 10 mg  1 mL Intradermal Once Yajaira Watts FNP        LIDOcaine (PF) 40 mg/mL (4 %) injection 1 mL  1 mL Other 1 time in Clinic/HOD Mekhi Grossman MD        LIDOcaine HCL 4% (XYLOCAINE) 4 % (40 mg/mL) external solution             phenylephrine HCL 1% (SINA-SYNEPHRINE) 1 % nasal spray             phenylephrine HCL 1% nasal spray 1 spray  1 spray Each Nostril 1 time in Clinic/HOD Mekhi Grossman MD        sodium chloride 0.9% flush 10 mL  10 mL Intravenous PRN Anna Taylor MD        sodium chloride 0.9% flush 10 mL  10 mL Intravenous PRN Jose Johns MD           Physical Exam:  /87 (BP Location: Left arm, Patient Position: Sitting, BP Method: Medium  "(Automatic))   Pulse 71   Temp 97.1 °F (36.2 °C) (Oral)   Resp 18   Ht 5' 10" (1.778 m)   Wt 80.7 kg (177 lb 14.6 oz)   SpO2 100%   BMI 25.53 kg/m²     General:  Well-developed well-nourished male in no acute distress.  Voice is normal.  Head and face:  Normocephalic.  No facial lesions.  No temporomandibular joint tenderness or click.  Ears:  Right ear-auricle is normally developed.  External auditory canal is clear.  Tympanic membrane is nonerythematous.  No middle ear effusion.  Left ear-auricle is normally developed.  External auditory canal is clear.  Tympanic membrane is nonerythematous.  No middle ear effusion.  Nose:  Nasal dorsum is unremarkable.  No significant septal deviation.  No significant intranasal congestion.  Secretions are clear.  Oral cavity and oropharynx:  Tongue and floor mouth are without lesions.  Mucosa is moist.  No pharyngeal erythema or exudates.  No oropharyngeal masses.  No tonsillar hypertrophy.     Neck:  Supple without adenopathy or thyromegaly.  Trachea is in the midline.  Parotid and submandibular glands are normal to palpation without masses or tenderness.  Eyes:  Extraocular muscles intact.  No nystagmus.  No exophthalmos or enophthalmos.  Neurologic:  Alert and oriented.  Cranial nerves 2-12 are grossly normal.    PROCEDURE: Flexible fiberoptic laryngoscopy    Surgeon: Kareem Schmitt   Anesthesia: None  Complications: None  Date: 07/31/2024  Indication: hx of cancer    Procedure in Detail:    Informed consent was obtained from the patient after explanation of procedure, indications, risks and benefits. Flexible laryngoscopy was performed on Yaron Brennan through the right nasal passage(s). The nasal cavity, nasopharynx, oropharynx, hypopharynx and larynx were adequately visualized. The true vocal cords and arytenoids were examined during phonation and repose.    Operative Findings:    Nasal Cavity:  nasal airway patent without lesions or ulcerations  Nasopharynx: No " adenoid hypertrophy, no masses or ulcerations noted in the fossae of Rosenmüller, patent appearing mariel tubarii  Tongue Base: No fullness or lingual tonsillar hypertrophy. No masses.  Pharyngeal Walls: No lesions or ulcerations noted  Epiglottis / Aryepiglottic Folds: Crisp epiglottis without lesions, normal-appearing aryepiglottic folds without lesions.   Arytenoids: Full symmetric range of motion bilaterally, no lesions  Piriform Sinus: No lesions or masses noted  Vocal Cords: Symmetric movement bilaterally with good glottic closure, patent airway during inspiration and expiration, no lesions or masses      The patient tolerated the procedure well without any complications.    Impression: Radiation changes with no identifiable malignancy.     Pertinent Data:    CT soft tissue neck (2/22/24): I have personally reviewed this image. No new masses or lesions. No concerning lymphadenopathy. No prominence of base of tongue. No evidence of disease.       Assessment:  Yaron Brennan is a 59 y.o. 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 CRT, finished 10/10/22.  Most recent CT (2/22/24) without evidence of disease. PET 1/11/23 without disease (in [facility] system), TSH wnl 7/20/23.     IVAN today.     Plan:   -  Next surveillance CT due in October 2024 for the 2 year surveillance paul. Order at next visit.   - Recent TSH, T4 WNL.   - Follow-up in 3 months for routine surveillance.    I. Sidra Schmitt MD  [facility] Department of Otolaryngology  [facility]        HEAD & NECK CANCER SURVEILLANCE   Radiation and/or Chemotherapy     Medical Oncologist: Kevon Welsh MD  Radiation Oncologist: Unknown    Primary tumor: cT3 cN2 M0 p16+ base of tongue    Date of Diagnosis: 7/6/22  Therapy: Cisplatin 100 mg/m² IV days 1, 22, and 23, concurrent with RT  Completion Date: 10/10/22    Pertinent Treatment History:   First presentation 7/2022  CT neck  7/5/22 & 7/29/22  Panendoscopy 7/6/22  Right UE DVT 2/2 PICC 9/15/22 on Xarelto  CRT 8/25/22 - 10/10/22  Direct laryngoscopy with biopsy of benign laryngeal ductal cyst on October 2, 2023.    Place date if completed   3 6 12 18 24 36 48 60   CT Neck 1/31/23 6/8/23 8/25/23 2/22/24 X X X X   PET/CT 1/11/23          CXR  7/17/23 X X X X X X   TFT 2/2/23 7/20/23 X  X X X X     Current Surveillance Interval: post-tx year 1-2, q2-3 mo

## 2024-08-07 DIAGNOSIS — G47.09 OTHER INSOMNIA: ICD-10-CM

## 2024-08-07 RX ORDER — TRAZODONE HYDROCHLORIDE 100 MG/1
100 TABLET ORAL NIGHTLY
Qty: 30 TABLET | Refills: 3 | Status: SHIPPED | OUTPATIENT
Start: 2024-08-07

## 2024-08-30 ENCOUNTER — HOSPITAL ENCOUNTER (OUTPATIENT)
Dept: RADIOLOGY | Facility: HOSPITAL | Age: 59
Discharge: HOME | End: 2024-08-30
Attending: STUDENT IN AN ORGANIZED HEALTH CARE EDUCATION/TRAINING PROGRAM
Payer: MEDICAID

## 2024-08-30 DIAGNOSIS — C01 SQUAMOUS CELL CARCINOMA OF BASE OF TONGUE: ICD-10-CM

## 2024-08-30 LAB
CREAT SERPL-MCNC: 1.31 MG/DL (ref 0.73–1.18)
GFR SERPLBLD CREATININE-BSD FMLA CKD-EPI: >60 ML/MIN/1.73/M2

## 2024-08-30 PROCEDURE — 70491 CT SOFT TISSUE NECK W/DYE: CPT | Mod: TC

## 2024-08-30 PROCEDURE — 82565 ASSAY OF CREATININE: CPT | Performed by: STUDENT IN AN ORGANIZED HEALTH CARE EDUCATION/TRAINING PROGRAM

## 2024-08-30 PROCEDURE — 25500020 PHARM REV CODE 255

## 2024-08-30 PROCEDURE — 71046 X-RAY EXAM CHEST 2 VIEWS: CPT | Mod: TC

## 2024-08-30 RX ADMIN — IOHEXOL 75 ML: 350 INJECTION, SOLUTION INTRAVENOUS at 09:08

## 2024-09-04 DIAGNOSIS — G47.09 OTHER INSOMNIA: ICD-10-CM

## 2024-09-04 RX ORDER — ONDANSETRON 4 MG/1
4 TABLET, ORALLY DISINTEGRATING ORAL EVERY 8 HOURS PRN
Qty: 15 TABLET | Refills: 0 | Status: SHIPPED | OUTPATIENT
Start: 2024-09-04

## 2024-09-04 RX ORDER — TRAZODONE HYDROCHLORIDE 100 MG/1
100 TABLET ORAL NIGHTLY
Qty: 30 TABLET | Refills: 3 | OUTPATIENT
Start: 2024-09-04

## 2024-09-16 DIAGNOSIS — I10 PRIMARY HYPERTENSION: Chronic | ICD-10-CM

## 2024-09-18 RX ORDER — AMLODIPINE BESYLATE 10 MG/1
10 TABLET ORAL DAILY
Qty: 60 TABLET | Refills: 0 | Status: SHIPPED | OUTPATIENT
Start: 2024-09-18

## 2024-09-18 NOTE — TELEPHONE ENCOUNTER
Pt requesting refill for his amLODIPine (NORVASC) 10 MG tablet     LOV: 4/30/24        NOV: 10/30/24

## 2024-09-23 ENCOUNTER — TELEPHONE (OUTPATIENT)
Dept: HEMATOLOGY/ONCOLOGY | Facility: CLINIC | Age: 59
End: 2024-09-23
Payer: MEDICAID

## 2024-09-24 ENCOUNTER — OFFICE VISIT (OUTPATIENT)
Dept: HEMATOLOGY/ONCOLOGY | Facility: CLINIC | Age: 59
End: 2024-09-24
Payer: MEDICAID

## 2024-09-24 ENCOUNTER — APPOINTMENT (OUTPATIENT)
Dept: HEMATOLOGY/ONCOLOGY | Facility: CLINIC | Age: 59
End: 2024-09-24
Payer: MEDICAID

## 2024-09-24 VITALS
HEART RATE: 74 BPM | WEIGHT: 176.81 LBS | SYSTOLIC BLOOD PRESSURE: 156 MMHG | BODY MASS INDEX: 25.31 KG/M2 | TEMPERATURE: 98 F | HEIGHT: 70 IN | DIASTOLIC BLOOD PRESSURE: 92 MMHG | OXYGEN SATURATION: 100 %

## 2024-09-24 DIAGNOSIS — R19.5 POSITIVE COLORECTAL CANCER SCREENING USING COLOGUARD TEST: ICD-10-CM

## 2024-09-24 DIAGNOSIS — C01 SQUAMOUS CELL CARCINOMA OF BASE OF TONGUE: ICD-10-CM

## 2024-09-24 DIAGNOSIS — Z72.0 TOBACCO ABUSE: ICD-10-CM

## 2024-09-24 DIAGNOSIS — C77.0 SECONDARY MALIGNANT NEOPLASM OF CERVICAL LYMPH NODE: ICD-10-CM

## 2024-09-24 DIAGNOSIS — Z78.9 ALCOHOL USE: ICD-10-CM

## 2024-09-24 DIAGNOSIS — C01 SQUAMOUS CELL CARCINOMA OF BASE OF TONGUE: Primary | ICD-10-CM

## 2024-09-24 LAB
ALBUMIN SERPL-MCNC: 4.1 G/DL (ref 3.5–5)
ALBUMIN/GLOB SERPL: 1.2 RATIO (ref 1.1–2)
ALP SERPL-CCNC: 89 UNIT/L (ref 40–150)
ALT SERPL-CCNC: 8 UNIT/L (ref 0–55)
ANION GAP SERPL CALC-SCNC: 6 MEQ/L
AST SERPL-CCNC: 18 UNIT/L (ref 5–34)
BASOPHILS # BLD AUTO: 0.03 X10(3)/MCL
BASOPHILS NFR BLD AUTO: 0.4 %
BILIRUB SERPL-MCNC: 0.3 MG/DL
BUN SERPL-MCNC: 13.2 MG/DL (ref 8.4–25.7)
CALCIUM SERPL-MCNC: 9.5 MG/DL (ref 8.4–10.2)
CHLORIDE SERPL-SCNC: 106 MMOL/L (ref 98–107)
CO2 SERPL-SCNC: 27 MMOL/L (ref 22–29)
CREAT SERPL-MCNC: 1.22 MG/DL (ref 0.73–1.18)
CREAT/UREA NIT SERPL: 11
EOSINOPHIL # BLD AUTO: 0.27 X10(3)/MCL (ref 0–0.9)
EOSINOPHIL NFR BLD AUTO: 3.7 %
ERYTHROCYTE [DISTWIDTH] IN BLOOD BY AUTOMATED COUNT: 13.4 % (ref 11.5–17)
GFR SERPLBLD CREATININE-BSD FMLA CKD-EPI: >60 ML/MIN/1.73/M2
GLOBULIN SER-MCNC: 3.4 GM/DL (ref 2.4–3.5)
GLUCOSE SERPL-MCNC: 106 MG/DL (ref 74–100)
HCT VFR BLD AUTO: 40.2 % (ref 42–52)
HGB BLD-MCNC: 13.9 G/DL (ref 14–18)
IMM GRANULOCYTES # BLD AUTO: 0.02 X10(3)/MCL (ref 0–0.04)
IMM GRANULOCYTES NFR BLD AUTO: 0.3 %
LYMPHOCYTES # BLD AUTO: 1.03 X10(3)/MCL (ref 0.6–4.6)
LYMPHOCYTES NFR BLD AUTO: 14 %
MCH RBC QN AUTO: 31.3 PG (ref 27–31)
MCHC RBC AUTO-ENTMCNC: 34.6 G/DL (ref 33–36)
MCV RBC AUTO: 90.5 FL (ref 80–94)
MONOCYTES # BLD AUTO: 0.76 X10(3)/MCL (ref 0.1–1.3)
MONOCYTES NFR BLD AUTO: 10.3 %
NEUTROPHILS # BLD AUTO: 5.24 X10(3)/MCL (ref 2.1–9.2)
NEUTROPHILS NFR BLD AUTO: 71.3 %
NRBC BLD AUTO-RTO: 0 %
PLATELET # BLD AUTO: 231 X10(3)/MCL (ref 130–400)
PMV BLD AUTO: 9 FL (ref 7.4–10.4)
POTASSIUM SERPL-SCNC: 3.5 MMOL/L (ref 3.5–5.1)
PROT SERPL-MCNC: 7.5 GM/DL (ref 6.4–8.3)
RBC # BLD AUTO: 4.44 X10(6)/MCL (ref 4.7–6.1)
SODIUM SERPL-SCNC: 139 MMOL/L (ref 136–145)
WBC # BLD AUTO: 7.35 X10(3)/MCL (ref 4.5–11.5)

## 2024-09-24 PROCEDURE — 3008F BODY MASS INDEX DOCD: CPT | Mod: CPTII,,, | Performed by: NURSE PRACTITIONER

## 2024-09-24 PROCEDURE — 1159F MED LIST DOCD IN RCRD: CPT | Mod: CPTII,,, | Performed by: NURSE PRACTITIONER

## 2024-09-24 PROCEDURE — 3077F SYST BP >= 140 MM HG: CPT | Mod: CPTII,,, | Performed by: NURSE PRACTITIONER

## 2024-09-24 PROCEDURE — 85025 COMPLETE CBC W/AUTO DIFF WBC: CPT

## 2024-09-24 PROCEDURE — 80053 COMPREHEN METABOLIC PANEL: CPT

## 2024-09-24 PROCEDURE — 1160F RVW MEDS BY RX/DR IN RCRD: CPT | Mod: CPTII,,, | Performed by: NURSE PRACTITIONER

## 2024-09-24 PROCEDURE — 99214 OFFICE O/P EST MOD 30 MIN: CPT | Mod: PBBFAC | Performed by: NURSE PRACTITIONER

## 2024-09-24 PROCEDURE — 36415 COLL VENOUS BLD VENIPUNCTURE: CPT

## 2024-09-24 PROCEDURE — 3080F DIAST BP >= 90 MM HG: CPT | Mod: CPTII,,, | Performed by: NURSE PRACTITIONER

## 2024-09-24 PROCEDURE — 99214 OFFICE O/P EST MOD 30 MIN: CPT | Mod: S$PBB,,, | Performed by: NURSE PRACTITIONER

## 2024-09-24 NOTE — PROGRESS NOTES
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 Intermittent alcohol use.  Procedure/surgical history: Back surgery (20-30 years ago, in use 10, apparently involving L5 fusion for prolapse this from work-related injury).  Social history:  Has been smoking a pack of cigarettes daily since age 17 years.  Has been drinking 6 beers daily for last 40 years.  Lately, trying to quit smoking and drinking.  Smokes marijuana.  .  Has 2 biological children of his own.  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  Lives in [place], [place]..  Family history:  Father  from some kind of cancer at age 72 years.  Health maintenance:  Does not have a regular primary care provider.  Does not visit with doctors.  No screening colonoscopy ever        History of Present Illness:    Oncologic/Hematologic History:  Oncology History   Squamous cell carcinoma of base of tongue   2022 Cancer Staged    Staging form: Pharynx - HPV-Mediated Oropharynx, AJCC 8th Edition  - Clinical stage from 2022: Stage II (cT2, cN2, cM0, p16+)     2022 Initial Diagnosis    Squamous cell carcinoma of base of tongue     2022 - 10/11/2022 Chemotherapy    Treatment Summary   Plan Name: OP HEAD NECK CISPLATIN Q3W  Treatment Goal: Curative  Status: Inactive  Start Date: 2022  End Date: 10/11/2022  Provider: Kevon Welsh MD  Chemotherapy: CISplatin (PLATINOL) 100 mg/m2 = 187 mg in sodium chloride  0.9% 1,187 mL chemo infusion, 100 mg/m2 = 187 mg, Intravenous, [facility] [place] 1 time, 3 of 3 cycles  Administration: 187 mg (8/25/2022), 187 mg (9/19/2022), 187 mg (10/10/2022)     57-year-old gentleman     He was admitted to [facility] 07/2022 with 2 months history of progressive dysphagia, odynophagia, soreness of tongue, 20 lb weight loss and bilateral neck masses.  Also, hoarseness.  CT neck showed ulcerated mass in the tongue base, malignant-appearing, along with lymph node metastasis.  ENT performed a laryngoscopy with biopsy of tongue mass.  Friable base of tongue mass.  No airway compromise.  Right upper lung lobe indistinct, ground-glass lung nodules, typical of a benign inflammatory etiology.  Discharged 07/07/2022.  Patient reported weight loss of> 20 lb in previous 2 months.    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, p16 +:  -presentation: 07/2022:  Dysphagia, odynophagia, 20 lb weight loss, bilateral neck masses, hoarseness  -CT soft tissues of the neck without contrast 07/05/2022, 07/29/2022  -S/P panendoscopy 07/06/2022  -ulcerated tongue base mass, at least 5 cm  -ulcerated friable mass base of the tongue, confined base of tongue on panendoscopy  -right level 2 lymph node 3.2 x 2.9 cm on CT  -left level 3 lymph node 5.1 x 4.2 cm on CT  -invasive squamous cell carcinoma, grade 3 of 4; p16 +  -no intrathoracic metastasis on CT chest  >>cT3 cN2 M0, clinical stage II  -08/25/2022:  High-dose cisplatin, concurrent with radiotherapy, started  -09/14/2022: Staging PET-CT (comparison:  CT soft tissues of the neck 07/29/2022):  1. Tongue base midline central necrotic ABD hypermetabolic mass, consistent with squamous cell carcinoma (4.2 x 2.4 cm, maximum SUV 10.02  2. Bilateral neck hypermetabolic enlarged lymph nodes are consistent with metastases (left neck level 3 lymph node, centrally necrotic sabrina complex, 4.6 x 4.4 cm, maximum SUV 5.27); left neck level 2  hypermetabolic enlarged lymph node, 2.2 x 1.8 cm, maximum SUV 7.05) (right neck level 2 central necrotic peripherally hypermetabolic enlarged lymph node, 3.0 x 2.4 cm, maximum SUV 4.27)  -09/15/2022:  CV ultrasound Doppler venous right upper extremity (pain and swelling right upper extremity):  + for DVT; extensive thrombus seen in right subclavian, axilla, and basilic veins; also, thrombus in proximal cephalic vein (started on Xarelto)  (The DVT was related to PICC line; it was removed by surgery on 09/15/2022; MediPort was unable to be placed)  -cycle 3 of high-dose cisplatin 10/11/2022  -S/P radiotherapy 08/24/2022-10/11/2022 (7000 cGy; 35 fractions; 48 elapsed days)  -11/21/2022: Cologuard +  -01/11/2023:  Restaging PET-CT (comparison:  Whole-body PET-CT 08/18/2022):  No residual primary tongue malignancy or cervical metastatic lymphadenopathy and no new metastatic disease identified at the neck or distantly        Interval History 9/24/24:  Patient presents to clinic alone for a scheduled follow up head and neck surveillance visit.  Patient reports doing well without any significant reportable symptoms.  Patient reports compliance and regular follow up with ENT.  Patient says previous ENT visit was normal without any concerns for recurrence or new areas of malignancy.  He denies any mouth sores/lesions, dysphagia, throat pain, dyspnea, chest pain, hearing loss, unintentional weight loss, fever or any signs of infection.  Patient reports good appetite and energy level.  Patient does admit to persistent dry mouth.  Patient also reports occasional numbness and tingling.  He is due to follow up with ENT next month and he will address his concerns of dry at that time.  Lab work reviewed with the patient, slightly elevated creatinine level.  Patient says he drinks a 6 pack of water daily.  Patient also admits to drinking a few sodas a day.  We discussed plan of care and follow up.      Review of Systems   HENT:           Dry mouth   Neurological:  Positive for tingling.   All other systems reviewed and are negative.      Physical Exam  Constitutional:       Appearance: Normal appearance.   HENT:      Head: Normocephalic.      Mouth/Throat:      Mouth: Mucous membranes are moist.   Eyes:      Pupils: Pupils are equal, round, and reactive to light.   Cardiovascular:      Rate and Rhythm: Normal rate and regular rhythm.      Pulses: Normal pulses.      Heart sounds: Normal heart sounds.   Pulmonary:      Effort: Pulmonary effort is normal.      Breath sounds: Normal breath sounds.   Abdominal:      General: Bowel sounds are normal.      Palpations: Abdomen is soft.   Musculoskeletal:         General: Normal range of motion.      Cervical back: Normal range of motion.   Skin:     General: Skin is warm and dry.      Capillary Refill: Capillary refill takes less than 2 seconds.   Neurological:      General: No focal deficit present.      Mental Status: He is alert and oriented to person, place, and time.   Psychiatric:         Attention and Perception: Attention normal.         Mood and Affect: Affect normal.         Speech: Speech normal.         Behavior: Behavior normal.         Thought Content: Thought content normal.         VITAL SIGNS:   Vitals:    09/24/24 1253   BP: (!) 156/92   Pulse:    Temp:        Lab Results   Component Value Date    WBC 7.35 09/24/2024    RBC 4.44 (L) 09/24/2024    HGB 13.9 (L) 09/24/2024    HCT 40.2 (L) 09/24/2024    MCV 90.5 09/24/2024    MCH 31.3 (H) 09/24/2024    MCHC 34.6 09/24/2024    RDW 13.4 09/24/2024     09/24/2024    MPV 9.0 09/24/2024     CMP  Sodium   Date Value Ref Range Status   09/24/2024 139 136 - 145 mmol/L Final     Potassium   Date Value Ref Range Status   09/24/2024 3.5 3.5 - 5.1 mmol/L Final     Chloride   Date Value Ref Range Status   09/24/2024 106 98 - 107 mmol/L Final     CO2   Date Value Ref Range Status   09/24/2024 27 22 - 29 mmol/L Final     Blood Urea Nitrogen   Date  Value Ref Range Status   09/24/2024 13.2 8.4 - 25.7 mg/dL Final     Creatinine   Date Value Ref Range Status   09/24/2024 1.22 (H) 0.73 - 1.18 mg/dL Final     Calcium   Date Value Ref Range Status   09/24/2024 9.5 8.4 - 10.2 mg/dL Final     Albumin   Date Value Ref Range Status   09/24/2024 4.1 3.5 - 5.0 g/dL Final     Bilirubin Total   Date Value Ref Range Status   09/24/2024 0.3 <=1.5 mg/dL Final     ALP   Date Value Ref Range Status   09/24/2024 89 40 - 150 unit/L Final     AST   Date Value Ref Range Status   09/24/2024 18 5 - 34 unit/L Final     ALT   Date Value Ref Range Status   09/24/2024 8 0 - 55 unit/L Final     eGFR   Date Value Ref Range Status   09/24/2024 >60 mL/min/1.73/m2 Final         Assessment:  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, p16 +:  -presentation: 07/2022:  Dysphagia, odynophagia, 20 lb weight loss, bilateral neck masses, hoarseness  -CT soft tissues of the neck without contrast 07/05/2022, 07/29/2022  -S/P panendoscopy 07/06/2022  -ulcerated tongue base mass, at least 5 cm  -ulcerated friable mass base of the tongue, confined base of tongue on panendoscopy  -right level 2 lymph node 3.2 x 2.9 cm on CT, 3.0 x 2.4 cm on PET-CT  -left level 3 lymph node 5.1 x 4.2 cm on CT, 4.6 x 4.4 cm on PET-CT  -left neck level 2 lymph node, 2.2 x 1.8 cm on PET-CT  -invasive squamous cell carcinoma, grade 3 of 4; p16 +  -no intrathoracic metastasis on CT chest  >>cT3 cN2 M0, clinical stage II  -plan: Chemoradiation therapy  -S/P high-dose cisplatin every 3 weeks x3 (08/25/2022-10/11/2022)  -S/P radiotherapy 08/24/2022-10/11/2022 (7000 cGy; 35 fractions; 48 elapsed days)  -11/21/2022: Cologuard +  -01/11/2023: Restaging PET-CT:  Complete response  -CT neck 08/25/2023:  IVAN  -direct laryngoscopy 10/02/2023:  (preop diagnosis: Epiglottic mass):  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 [biopsy of epiglottic lesion:  Negative for malignancy   -10/11/2023:  Colonoscopy (positive  Cologuard) (Joseph Louise MD):  Second-degree hemorrhoids; repeat colonoscopy in 10 years  -CT neck 02/22/2024:  IVAN  -02/29/2024:  Left hard palate, biopsy:  Squamous papilloma; 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; negative for malignancy  -02/21/2024: TSH 2.718, normal       PICC line related DVT right subclavian, axillary, and basilic veins (09/15/2022):   -presentation:  Pain and swelling right upper extremity  -anticoagulation with Xarelto started 09/15/2022       # history of tobacco abuse since age 17  # history of alcohol use        Plan:  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, p16 +  plan: Chemoradiation therapy-completed    Continue surveillance with ENT  We will follow him peripherally  Follow-up w/NP in 6 months with lab work (cbc,cmp,tsh)    11/21/2022: Cologuard +  -10/11/2023:  Colonoscopy (positive Cologuard) (Joseph Louise MD):  Second-degree hemorrhoids;   -repeat colonoscopy in 10 years     Tobacco abuse  -abstinence from smoking and alcohol discussed once again and strongly encouraged.  -03/18/2024: Tells me that he quit smoking tobacco and drinking alcohol when he was diagnosed with throat cancer; however, does smoke marijuana daily, and aspirin smoke anymore last 20 years at least.  Smoking Cessation encouraged     Above discussed with him.  All questions answered.    He and his wife understand and agree with this plan.

## 2024-10-04 DIAGNOSIS — M51.379 DDD (DEGENERATIVE DISC DISEASE), LUMBOSACRAL: Chronic | ICD-10-CM

## 2024-10-07 RX ORDER — TIZANIDINE 4 MG/1
4 TABLET ORAL EVERY 8 HOURS PRN
Qty: 30 TABLET | Refills: 0 | Status: SHIPPED | OUTPATIENT
Start: 2024-10-07

## 2024-10-18 DIAGNOSIS — M51.379 DDD (DEGENERATIVE DISC DISEASE), LUMBOSACRAL: Chronic | ICD-10-CM

## 2024-10-18 DIAGNOSIS — I10 PRIMARY HYPERTENSION: Chronic | ICD-10-CM

## 2024-10-18 DIAGNOSIS — G47.09 OTHER INSOMNIA: ICD-10-CM

## 2024-10-18 RX ORDER — TIZANIDINE 4 MG/1
4 TABLET ORAL EVERY 8 HOURS PRN
Qty: 30 TABLET | Refills: 0 | Status: SHIPPED | OUTPATIENT
Start: 2024-10-18

## 2024-10-21 RX ORDER — AMLODIPINE BESYLATE 10 MG/1
10 TABLET ORAL DAILY
Qty: 60 TABLET | Refills: 0 | Status: SHIPPED | OUTPATIENT
Start: 2024-10-21

## 2024-10-21 RX ORDER — NABUMETONE 750 MG/1
750 TABLET, FILM COATED ORAL 2 TIMES DAILY PRN
Qty: 60 TABLET | Refills: 0 | Status: SHIPPED | OUTPATIENT
Start: 2024-10-21

## 2024-10-21 RX ORDER — TRAZODONE HYDROCHLORIDE 100 MG/1
100 TABLET ORAL NIGHTLY
Qty: 30 TABLET | Refills: 0 | Status: SHIPPED | OUTPATIENT
Start: 2024-10-21

## 2024-10-21 RX ORDER — ONDANSETRON 4 MG/1
4 TABLET, ORALLY DISINTEGRATING ORAL EVERY 8 HOURS PRN
Qty: 15 TABLET | Refills: 0 | Status: SHIPPED | OUTPATIENT
Start: 2024-10-21

## 2024-10-30 ENCOUNTER — OFFICE VISIT (OUTPATIENT)
Dept: INTERNAL MEDICINE | Facility: CLINIC | Age: 59
End: 2024-10-30
Payer: MEDICAID

## 2024-10-30 VITALS
SYSTOLIC BLOOD PRESSURE: 120 MMHG | BODY MASS INDEX: 24.96 KG/M2 | WEIGHT: 174.31 LBS | OXYGEN SATURATION: 100 % | DIASTOLIC BLOOD PRESSURE: 74 MMHG | HEIGHT: 70 IN | TEMPERATURE: 98 F | RESPIRATION RATE: 16 BRPM | HEART RATE: 77 BPM

## 2024-10-30 DIAGNOSIS — Z12.5 SCREENING FOR MALIGNANT NEOPLASM OF PROSTATE: ICD-10-CM

## 2024-10-30 DIAGNOSIS — I10 PRIMARY HYPERTENSION: Primary | Chronic | ICD-10-CM

## 2024-10-30 DIAGNOSIS — M51.372 DEGENERATION OF INTERVERTEBRAL DISC OF LUMBOSACRAL REGION WITH DISCOGENIC BACK PAIN AND LOWER EXTREMITY PAIN: ICD-10-CM

## 2024-10-30 DIAGNOSIS — Z23 INFLUENZA VACCINE NEEDED: ICD-10-CM

## 2024-10-30 DIAGNOSIS — Z72.0 TOBACCO ABUSE: Chronic | ICD-10-CM

## 2024-10-30 DIAGNOSIS — Z00.00 WELL ADULT EXAM: ICD-10-CM

## 2024-10-30 DIAGNOSIS — G47.09 OTHER INSOMNIA: ICD-10-CM

## 2024-10-30 PROBLEM — Z78.9 ALCOHOL USE: Status: RESOLVED | Noted: 2022-07-16 | Resolved: 2024-10-30

## 2024-10-30 PROBLEM — Z76.89 ENCOUNTER TO ESTABLISH CARE: Status: RESOLVED | Noted: 2022-11-15 | Resolved: 2024-10-30

## 2024-10-30 PROBLEM — F10.90 ALCOHOL USE: Status: RESOLVED | Noted: 2022-07-16 | Resolved: 2024-10-30

## 2024-10-30 PROCEDURE — 90471 IMMUNIZATION ADMIN: CPT | Mod: PBBFAC

## 2024-10-30 PROCEDURE — 99214 OFFICE O/P EST MOD 30 MIN: CPT | Mod: PBBFAC

## 2024-10-30 PROCEDURE — 90656 IIV3 VACC NO PRSV 0.5 ML IM: CPT | Mod: PBBFAC

## 2024-10-30 RX ORDER — TIZANIDINE 4 MG/1
4 TABLET ORAL EVERY 8 HOURS PRN
Qty: 30 TABLET | Refills: 6 | Status: SHIPPED | OUTPATIENT
Start: 2024-10-30

## 2024-10-30 RX ORDER — NABUMETONE 750 MG/1
750 TABLET, FILM COATED ORAL 2 TIMES DAILY PRN
Qty: 30 TABLET | Refills: 6 | Status: SHIPPED | OUTPATIENT
Start: 2024-10-30

## 2024-10-30 RX ORDER — TRAZODONE HYDROCHLORIDE 100 MG/1
100 TABLET ORAL NIGHTLY
Qty: 90 TABLET | Refills: 2 | Status: SHIPPED | OUTPATIENT
Start: 2024-10-30

## 2024-10-30 RX ORDER — AMLODIPINE BESYLATE 10 MG/1
10 TABLET ORAL DAILY
Qty: 90 TABLET | Refills: 2 | Status: SHIPPED | OUTPATIENT
Start: 2024-10-30

## 2024-10-30 RX ADMIN — INFLUENZA VIRUS VACCINE 0.5 ML: 15; 15; 15 SUSPENSION INTRAMUSCULAR at 12:10

## 2024-10-31 ENCOUNTER — OFFICE VISIT (OUTPATIENT)
Dept: OTOLARYNGOLOGY | Facility: CLINIC | Age: 59
End: 2024-10-31
Payer: MEDICAID

## 2024-10-31 VITALS
SYSTOLIC BLOOD PRESSURE: 147 MMHG | WEIGHT: 174 LBS | DIASTOLIC BLOOD PRESSURE: 91 MMHG | HEIGHT: 70 IN | TEMPERATURE: 98 F | HEART RATE: 87 BPM | BODY MASS INDEX: 24.91 KG/M2 | RESPIRATION RATE: 20 BRPM | OXYGEN SATURATION: 99 %

## 2024-10-31 DIAGNOSIS — Z92.3 HISTORY OF RADIATION TO HEAD AND NECK REGION: ICD-10-CM

## 2024-10-31 DIAGNOSIS — Z85.819 HISTORY OF OROPHARYNGEAL CANCER: ICD-10-CM

## 2024-10-31 DIAGNOSIS — C01 SQUAMOUS CELL CARCINOMA OF BASE OF TONGUE: Primary | ICD-10-CM

## 2024-10-31 PROCEDURE — 99214 OFFICE O/P EST MOD 30 MIN: CPT | Mod: PBBFAC | Performed by: STUDENT IN AN ORGANIZED HEALTH CARE EDUCATION/TRAINING PROGRAM

## 2025-01-15 ENCOUNTER — OFFICE VISIT (OUTPATIENT)
Dept: OTOLARYNGOLOGY | Facility: CLINIC | Age: 60
End: 2025-01-15
Payer: MEDICARE

## 2025-01-15 VITALS
HEIGHT: 70 IN | HEART RATE: 91 BPM | DIASTOLIC BLOOD PRESSURE: 91 MMHG | BODY MASS INDEX: 24.62 KG/M2 | RESPIRATION RATE: 20 BRPM | SYSTOLIC BLOOD PRESSURE: 163 MMHG | WEIGHT: 172 LBS | OXYGEN SATURATION: 99 % | TEMPERATURE: 98 F

## 2025-01-15 DIAGNOSIS — C76.0 HEAD AND NECK MALIGNANCY: ICD-10-CM

## 2025-01-15 DIAGNOSIS — C01 SQUAMOUS CELL CARCINOMA OF BASE OF TONGUE: Primary | ICD-10-CM

## 2025-01-15 DIAGNOSIS — E03.9 HYPOTHYROIDISM, UNSPECIFIED TYPE: ICD-10-CM

## 2025-01-15 PROCEDURE — 31575 DIAGNOSTIC LARYNGOSCOPY: CPT | Mod: PBBFAC | Performed by: STUDENT IN AN ORGANIZED HEALTH CARE EDUCATION/TRAINING PROGRAM

## 2025-01-15 PROCEDURE — 99214 OFFICE O/P EST MOD 30 MIN: CPT | Mod: PBBFAC,25 | Performed by: OTOLARYNGOLOGY

## 2025-01-15 NOTE — PROGRESS NOTES
The scope used for the exam was:  Flexible scope ENF-P4  Serial Number:  1)    [id number]    []   2)    [id number]    []   3)    [id number]    []   4)    [id number]    []   5)    [id number]    []   6)    [id number]    [x]       The scope used for the exam was:  Rigid scope   Serial Number:  1)   [id number]    []   2)   [id number]    []   3)   7330    []   4)    3384   []   5)    0824   []   6)    5554   []     7)   7425    []   8)   2240    []   9)   1109    []       Answers submitted by the patient for this visit:  Review of Symptoms Questionnaire  (Submitted on 1/13/2025)  None of these: Yes  eye itching: Yes  cough: Yes  None of these : Yes  heartburn: Yes  None of these: Yes  Muscle aches / pain?: Yes  back pain: Yes  None of these : Yes  None of these: Yes  None of these : Yes  None of these: Yes  None of these: Yes  sleep disturbance: Yes

## 2025-01-15 NOTE — PROGRESS NOTES
[facility]  Otolaryngology Clinic Note    Yaron Brennan  Encounter Date: 1/15/2025  YOB: 1965  Physician: Jose Johns    Chief Complaint: Otalgia, neck mass    HPI: Yaron Brennan is a 59 y.o. male with HTN who presents as referral from Dr. Welsh for squamous cell carcinoma of base of tongue. Patient reports that toward the beginning of the year he started to have worsening left otalgia. He then noticed a bump/swelling on left neck that he thought was was due to a bug bite. This got progressively bigger over time and he then noticed some swelling on right neck as well. He eventually presented to [facility] in early July 2022. He had a CT neck that showed an ulcerated mass in the tongue base, and underwent direct laryngoscopy with ENT on 7/6/22 (Dr. Tay). Operative findings: ulcerated friable mass base of the tongue that seems confined to base of tongue; lingual surface of epiglottis is free from tumor; mass involves bilateral base of tongue, significantly midline. Results returned positive for p16+ SCCa. Today in clinic patient reports bilateral L>R otalgia, mild dysphagia/odynophagia. He reports he eats basically whatever he wants without overt signs of aspiration. He does report 40lb weight loss in the last 6 months. + Neck LAD. Reports voice changes. Denies any issues breathing. Smoked 1ppd since 17 years old (42 years) but quit a month ago when he got his diagnosis. Also previously drank 12 pack of beer a day and likewise quite a month ago. Referred to ENT for consideration of possible surgical resection.     07/05/2022: CT soft tissue of the neck without contrast:  -ulcerated tongue base mass, at least 3.4 cm by 3.0 cm x 3.1 cm; associated bulky level 2 and level 3 cervical lymphadenopathy; suspected level 1 and level 4 cervical lymphadenopathy; a right level 2 lymph node with central necrosis 2.6 x 3.3 cm; a left level 3 lymph node 4.5 x 3.2 cm; no  significant airway compromise; no acute or aggressive bony lesions  -malignant ulcerated type mass with lymph node metastasis; no airway compromise; right upper lung lobe indistinct ground-glass pulmonary nodules typical of benign infectious or inflammatory etiology    8/16/22: Here today for follow up. Had dental extractions performed on 8/5. Not having any issues. Taking in 4-5 ensures daily. Trying to take some other soft foods like grits and pudding.     9/22/22: Here for follow up. Patient is undergoing CRT and tolerating it well. He reports he has one more chemotherapy session left and about 2-3 weeks of radiation left. Overall maintaining his weight. No progressive dysphagia, swallowing fine. Also no breathing issues or other complaints.    11/3/22: Patient is here today for follow up.  He finished CRT on 10/10.  Patient states he is still in some pain in  his neck and inside of his mouth.   He is tolerating p.o. intake but states at times he does have difficulty swallowing.  He does have xerostomia.  Patient has not used his eardrops to loosen the cerumen impaction.  Denies any shortness of breath or difficulty breathing/ changes to voice,  changes to his neck masses.    12/7/22:  Patient returns for surveillance today.  Patient denies any new dysphagia, odynophagia, H&N lumps or bumps, otalgia, weight loss.  Overall pain is getting better, tolerating diet, gaining weight.    03/14/2023 doing well, no pain, no otalgia, dysphagia has improved significantly, weight is stable.  No hemoptysis.  Voice remains hoarse.  No airway distress.    4/11/2023: Returns today for follow-up. Reports doing well overall. No dysphagia, swallowing all foods without issue. Voice stable. No odynophagia, no SOB, no otalgia. Does note feeling like ears are clogged, has tried ear drops without success. Did get TSH since last visit, wnl.    5/18/23: Patient here today for follow up. Doing well since last visit. No major concerns.  Denies weight loss, new lumps/bumps, sore throat, dysphagia, odynophagia, or otalgia. Having some pain when chewing. Does not have dentures.     6/13/23: Returns today for follow-up/surveillance. Patient reports doing well overall. No issues with dysphagia, odynophagia, voice changes, otalgia, or weight loss. Still looking for dentist to get dentures made. Did get CT neck since last visit, but CXR and TSH still pending.     7/19/23: Here for surveillance. He is doing well. He is swallowing food of all consistencies without issue - no coughing or choking. He denies voice changes, weight loss, pain, or new lumps/bumps. He is breathing comfortably and active. Having trouble with sleep at night.    8/16/23: He has been doing very well since his last visit no issues at all.  He has eating a regular diet and no issues swallowing.  No voice changes lumps or bumps in her neck.    9/19/23:  Here today for follow-up after CT.  No issues in the interim.  Dysphagia and odynophagia are stable: Able to eat most things except for tough meat.  Weight has been stable.  No noted lumps/bumps.    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  Patient has done well postoperatively.  He still has a mild sore throat but this is improving.  He has no respiratory distress or dysphagia.  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  There was no evidence of malignancy.  Results were discussed with the patient.  Patient has no new problems today.    December 19, 2023:   Patient presents today for routine surveillance.  Patient is doing well.  He is tolerating his diet.  His only complaint is that of some xerostomia but this is not a significant problem.  He has no complaints of sore throat, ear pain, swelling in the neck, or difficulty  swallowing.  He has no other new problems today.    2/8/2024:   Patient presents today for routine follow-up.  Patient is doing well.  He had been seen by Dr. Leavitt, radiation Oncology, last week who found the patient to be without evident disease.  Patient continues to do well.  He has no complaints of sore throat, ear pain, difficulty swallowing, or swelling in the neck.  No hemoptysis.  Appetite has been good.  He has no other new problems today.    2/29/24: here today for follow up. No new issues. Denies sore throat, otalgia, dysphagia, or neck lumps/bumps. Has been maintaining weight with good appetite.     4/30/2024:  Here today for follow up.  No new issues.  Denies sore throat, otalgia, weight loss, or new lumps or bumps in his neck. Weight stable.     7/31/24:  Here for follow-up.  Reports occasional hoarseness otherwise no odynophagia dysphagia otalgia lumps or bumps.  Changes in weight, doing well    10/31/24: Doing well with no issues. No new lumps/bumps in neck. No ear pain, dysphagia, odynophagia, or weight loss. No complaints.     1/15/2025:  Returns in follow-up.  Denies any symptoms concerning for recurrence including odynophagia, dysphagia, ear pain, or new lumps or bumps.  His weight has been stable.  No concerns at this time.    Review of patient's allergies indicates:  No Known Allergies    Past Medical History:   Diagnosis Date    Alcohol use 07/16/2022    Cancer     Hypertension        Past Surgical History:   Procedure Laterality Date    BACK SURGERY      COLONOSCOPY  10/11/2023    DIRECT LARYNGOSCOPY N/A 08/05/2022    Procedure: LARYNGOSCOPY, DIRECT;  Surgeon: Mekhi Grossman MD;  Location: [facility] OR;  Service: ENT;  Laterality: N/A;    DIRECT LARYNGOSCOPY N/A 10/02/2023    Procedure: LARYNGOSCOPY, DIRECT;  Surgeon: Mekhi Grossman MD;  Location: [facility] OR;  Service: ENT;  Laterality: N/A;    EXTRACTION OF TOOTH N/A 08/05/2022    Procedure: EXTRACTION, TEETH;  Surgeon: Mekhi Grossman,  MD;  Location: [facility];  Service: ENT;  Laterality: N/A;    SPINE SURGERY      Not sure of date maybe in        Social History     Socioeconomic History    Marital status:      Spouse name: Blanca   Occupational History    Occupation: Disabled   Tobacco Use    Smoking status: Former     Current packs/day: 0.00     Types: Cigarettes     Start date: 1982     Quit date: 2022     Years since quittin.5    Smokeless tobacco: Never    Tobacco comments:     Been almost 35 yr   Substance and Sexual Activity    Alcohol use: Not Currently    Drug use: Yes     Types: Marijuana     Comment: Daily    Sexual activity: Yes     Partners: Female     Birth control/protection: None     Social Drivers of Health     Financial Resource Strain: High Risk (2024)    Overall Financial Resource Strain (CARDIA)     Difficulty of Paying Living Expenses: Very hard   Food Insecurity: No Food Insecurity (2024)    Hunger Vital Sign     Worried About Running Out of Food in the Last Year: Never true     Ran Out of Food in the Last Year: Never true   Transportation Needs: No Transportation Needs (2024)    PRAPARE - Transportation     Lack of Transportation (Medical): No     Lack of Transportation (Non-Medical): No   Physical Activity: Insufficiently Active (2024)    Exercise Vital Sign     Days of Exercise per Week: 4 days     Minutes of Exercise per Session: 30 min   Stress: No Stress Concern Present (2024)    [employer] [place] [employer] - Occupational Stress Questionnaire     Feeling of Stress : Only a little   Housing Stability: Patient Declined (2024)    Housing Stability Vital Sign     Unable to Pay for Housing in the Last Year: Patient declined     Unstable Housing in the Last Year: Patient declined       Family History   Problem Relation Name Age of Onset    Hypertension Mother Chayo Mary     Cancer Father Yaron Herrera Sr     Hypertension Sister Martha     Hypertension  "Brother Kevin        Outpatient Encounter Medications as of 1/15/2025   Medication Sig Dispense Refill    amLODIPine (NORVASC) 10 MG tablet Take 1 tablet (10 mg total) by mouth once daily. 90 tablet 2    melatonin (MELATIN) 3 mg tablet Take 2 tablets (6 mg total) by mouth nightly as needed for Insomnia. 30 tablet 0    nabumetone (RELAFEN) 750 MG tablet Take 1 tablet (750 mg total) by mouth 2 (two) times daily as needed for Pain. 30 tablet 6    ondansetron (ZOFRAN-ODT) 4 MG TbDL Take 1 tablet (4 mg total) by mouth every 8 (eight) hours as needed (nausea). 15 tablet 0    tiZANidine (ZANAFLEX) 4 MG tablet Take 1 tablet (4 mg total) by mouth every 8 (eight) hours as needed (muscle pain or spasm). 30 tablet 6    traZODone (DESYREL) 100 MG tablet Take 1 tablet (100 mg total) by mouth every evening. 90 tablet 2     Facility-Administered Encounter Medications as of 1/15/2025   Medication Dose Route Frequency Provider Last Rate Last Admin    lactated ringers infusion   Intravenous Continuous Elzbieta Quesada MD 10 mL/hr at 08/05/22 0808 New Bag at 08/05/22 0808    LIDOcaine (PF) 10 mg/ml (1%) injection 10 mg  1 mL Intradermal Once Yajaira Watts FNP        LIDOcaine (PF) 10 mg/ml (1%) injection 10 mg  1 mL Intradermal Once Yajaira Watts FNP        LIDOcaine (PF) 40 mg/mL (4 %) injection 1 mL  1 mL Other 1 time in Clinic/HOD Mekhi Grossman MD        phenylephrine HCL 1% nasal spray 1 spray  1 spray Each Nostril 1 time in Clinic/HOD Mekhi Grossman MD        sodium chloride 0.9% flush 10 mL  10 mL Intravenous PRN Anna Taylor MD        sodium chloride 0.9% flush 10 mL  10 mL Intravenous PRN Jose Johns MD           Physical Exam:  BP (!) 163/91 (BP Location: Right arm, Patient Position: Sitting)   Pulse 91   Temp 98.1 °F (36.7 °C) (Oral)   Resp 20   Ht 5' 10" (1.778 m)   Wt 78 kg (172 lb)   SpO2 99%   BMI 24.68 kg/m²     General:  Well-developed well-nourished male in no acute distress.  " Voice is normal.  Head and face:  Normocephalic.  No facial lesions.  No temporomandibular joint tenderness or click.  Ears:    Right ear-auricle is normally developed.  External auditory canal is clear.  Tympanic membrane is nonerythematous.  No middle ear effusion.   Left ear-auricle is normally developed.  External auditory canal is clear.  Tympanic membrane is nonerythematous.  No middle ear effusion.  Nose:  Nasal dorsum is unremarkable.  No significant septal deviation.  No significant intranasal congestion.  Oral cavity and oropharynx:  Tongue and floor mouth are without lesions.  Base of tongue is soft  Mucosa is moist.  No pharyngeal erythema or exudates.  No oropharyngeal masses.  No tonsillar hypertrophy.   Neck:  Supple without adenopathy or thyromegaly.  Trachea is in the midline.  Parotid and submandibular glands are normal to palpation without masses or tenderness.  Eyes:  Extraocular muscles intact.  No nystagmus.  No exophthalmos or enophthalmos.  Neurologic:  Alert and oriented.  Cranial nerves II-XII are grossly normal.    PROCEDURE: Flexible fiberoptic laryngoscopy    Surgeon: Jose Johns   Anesthesia: None  Complications: None  Date: 01/15/2025  Indication: hx of cancer    Procedure in Detail:    Informed consent was obtained from the patient after explanation of procedure, indications, risks and benefits. Flexible laryngoscopy was performed on Yaron Brennan through the right nasal passage(s). The nasal cavity, nasopharynx, oropharynx, hypopharynx and larynx were adequately visualized. The true vocal cords and arytenoids were examined during phonation and repose.    Operative Findings:    Nasal Cavity:  nasal airway patent without lesions or ulcerations  Nasopharynx: No adenoid hypertrophy, no masses or ulcerations noted in the fossae of Rosenmüller, patent appearing mariel tubarii  Tongue Base: No fullness or lingual tonsillar hypertrophy. No masses.  Pharyngeal Walls: No lesions or ulcerations  noted  Epiglottis / Aryepiglottic Folds: Crisp epiglottis without lesions, normal-appearing aryepiglottic folds without lesions.   Arytenoids: Full symmetric range of motion bilaterally, no lesions  Piriform Sinus: No lesions or masses noted  Vocal Cords: Symmetric movement bilaterally with good glottic closure, patent airway during inspiration and expiration, no lesions or masses      The patient tolerated the procedure well without any complications.    Impression: Radiation changes with no identifiable malignancy.     Pertinent Data:  CT  Neck:  Impression:  Post treatment changes of the base of tongue with no evidence of recurrent neoplasm.  No pathologic adenopathy.  Unchanged when compared to 02/22/2024      Assessment:  Yaron Brennan is a 59 y.o. 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 CRT, finished 10/10/22.  Most recent CT (2/22/24) without evidence of disease. PET 1/11/23 without disease (in [facility] system), TSH wnl 7/20/23.     IVAN today.     Plan:   - Will reorder TSH  - Follow-up in 3 months for routine surveillance.    Christopher Johns MD  [facility] Department of Otolaryngology  [facility]        HEAD & NECK CANCER SURVEILLANCE   Radiation and/or Chemotherapy     Medical Oncologist: Kevon Welsh MD  Radiation Oncologist: Unknown    Primary tumor: cT3 cN2 M0 p16+ base of tongue    Date of Diagnosis: 7/6/22  Therapy: Cisplatin 100 mg/m² IV days 1, 22, and 23, concurrent with RT  Completion Date: 10/10/22    Pertinent Treatment History:   First presentation 7/2022  CT neck 7/5/22 & 7/29/22  Panendoscopy 7/6/22  Right UE DVT 2/2 PICC 9/15/22 on Xarelto  CRT 8/25/22 - 10/10/22  Direct laryngoscopy with biopsy of benign laryngeal ductal cyst on October 2, 2023.    Place date if completed   3 6 12 18 24 36 48 60   CT Neck 1/31/23 6/8/23 8/25/23 2/22/24 8/30/24 X X X   PET/CT 1/11/23          CXR  7/17/23 X X 8/30/24 X X X    TFT 2/2/23 7/20/23 X  X X X X     Current Surveillance Interval: post-tx year 2-3, q3-4 mo

## 2025-01-17 DIAGNOSIS — M51.372 DEGENERATION OF INTERVERTEBRAL DISC OF LUMBOSACRAL REGION WITH DISCOGENIC BACK PAIN AND LOWER EXTREMITY PAIN: ICD-10-CM

## 2025-01-24 RX ORDER — TIZANIDINE 4 MG/1
4 TABLET ORAL EVERY 8 HOURS PRN
Qty: 30 TABLET | Refills: 6 | Status: SHIPPED | OUTPATIENT
Start: 2025-01-24

## 2025-01-24 NOTE — TELEPHONE ENCOUNTER
Please see the attached refill request. Pharmacy sent a request for a refill. LOV and start date on 10/30/24 with 6 refills. Next appt. 04/30/2025, please review/.

## 2025-03-15 DIAGNOSIS — M51.372 DEGENERATION OF INTERVERTEBRAL DISC OF LUMBOSACRAL REGION WITH DISCOGENIC BACK PAIN AND LOWER EXTREMITY PAIN: ICD-10-CM

## 2025-03-15 DIAGNOSIS — G47.09 OTHER INSOMNIA: ICD-10-CM

## 2025-03-17 RX ORDER — TRAZODONE HYDROCHLORIDE 100 MG/1
100 TABLET ORAL NIGHTLY
Qty: 90 TABLET | Refills: 2 | OUTPATIENT
Start: 2025-03-17

## 2025-03-17 RX ORDER — NABUMETONE 750 MG/1
750 TABLET, FILM COATED ORAL 2 TIMES DAILY PRN
Qty: 30 TABLET | Refills: 6 | Status: SHIPPED | OUTPATIENT
Start: 2025-03-17

## 2025-03-17 NOTE — TELEPHONE ENCOUNTER
LOV:10/30/24    NOV: 4/30/25    Relafen last filled on 10/30/24 with 5 r/f. Pt given a 15 day supply each r/f

## 2025-03-18 DIAGNOSIS — R19.5 POSITIVE COLORECTAL CANCER SCREENING USING COLOGUARD TEST: ICD-10-CM

## 2025-03-18 DIAGNOSIS — R93.813: ICD-10-CM

## 2025-03-18 DIAGNOSIS — Z72.0 TOBACCO ABUSE: ICD-10-CM

## 2025-03-18 DIAGNOSIS — C01 SQUAMOUS CELL CARCINOMA OF BASE OF TONGUE: Primary | ICD-10-CM

## 2025-03-18 DIAGNOSIS — R93.812 ABNORMAL RADIOLOGIC FINDINGS ON DIAGNOSTIC IMAGING OF LEFT TESTICLE: ICD-10-CM

## 2025-03-21 ENCOUNTER — TELEPHONE (OUTPATIENT)
Dept: HEMATOLOGY/ONCOLOGY | Facility: CLINIC | Age: 60
End: 2025-03-21
Payer: MEDICARE

## 2025-04-02 ENCOUNTER — LAB VISIT (OUTPATIENT)
Dept: HEMATOLOGY/ONCOLOGY | Facility: CLINIC | Age: 60
End: 2025-04-02
Payer: MEDICARE

## 2025-04-02 ENCOUNTER — OFFICE VISIT (OUTPATIENT)
Dept: HEMATOLOGY/ONCOLOGY | Facility: CLINIC | Age: 60
End: 2025-04-02
Payer: MEDICARE

## 2025-04-02 VITALS
HEIGHT: 70 IN | TEMPERATURE: 98 F | WEIGHT: 179 LBS | HEART RATE: 81 BPM | DIASTOLIC BLOOD PRESSURE: 84 MMHG | SYSTOLIC BLOOD PRESSURE: 135 MMHG | RESPIRATION RATE: 18 BRPM | BODY MASS INDEX: 25.62 KG/M2 | OXYGEN SATURATION: 98 %

## 2025-04-02 DIAGNOSIS — Z72.0 TOBACCO ABUSE: ICD-10-CM

## 2025-04-02 DIAGNOSIS — C01 SQUAMOUS CELL CARCINOMA OF BASE OF TONGUE: ICD-10-CM

## 2025-04-02 DIAGNOSIS — C01 SQUAMOUS CELL CARCINOMA OF BASE OF TONGUE: Primary | ICD-10-CM

## 2025-04-02 DIAGNOSIS — R19.5 POSITIVE COLORECTAL CANCER SCREENING USING COLOGUARD TEST: ICD-10-CM

## 2025-04-02 DIAGNOSIS — R93.813: ICD-10-CM

## 2025-04-02 DIAGNOSIS — R93.812 ABNORMAL RADIOLOGIC FINDINGS ON DIAGNOSTIC IMAGING OF LEFT TESTICLE: ICD-10-CM

## 2025-04-02 LAB
ALBUMIN SERPL-MCNC: 4.2 G/DL (ref 3.5–5)
ALBUMIN/GLOB SERPL: 1.3 RATIO (ref 1.1–2)
ALP SERPL-CCNC: 71 UNIT/L (ref 40–150)
ALT SERPL-CCNC: 11 UNIT/L (ref 0–55)
ANION GAP SERPL CALC-SCNC: 7 MEQ/L
AST SERPL-CCNC: 21 UNIT/L (ref 11–45)
BASOPHILS # BLD AUTO: 0.03 X10(3)/MCL
BASOPHILS NFR BLD AUTO: 0.5 %
BILIRUB SERPL-MCNC: 0.4 MG/DL
BUN SERPL-MCNC: 11.4 MG/DL (ref 8.4–25.7)
CALCIUM SERPL-MCNC: 8.6 MG/DL (ref 8.4–10.2)
CHLORIDE SERPL-SCNC: 107 MMOL/L (ref 98–107)
CO2 SERPL-SCNC: 25 MMOL/L (ref 22–29)
CREAT SERPL-MCNC: 1.08 MG/DL (ref 0.72–1.25)
CREAT/UREA NIT SERPL: 11
EOSINOPHIL # BLD AUTO: 0.26 X10(3)/MCL (ref 0–0.9)
EOSINOPHIL NFR BLD AUTO: 4 %
ERYTHROCYTE [DISTWIDTH] IN BLOOD BY AUTOMATED COUNT: 13.4 % (ref 11.5–17)
GFR SERPLBLD CREATININE-BSD FMLA CKD-EPI: >60 ML/MIN/1.73/M2
GLOBULIN SER-MCNC: 3.2 GM/DL (ref 2.4–3.5)
GLUCOSE SERPL-MCNC: 97 MG/DL (ref 74–100)
HCT VFR BLD AUTO: 40 % (ref 42–52)
HGB BLD-MCNC: 13.7 G/DL (ref 14–18)
IMM GRANULOCYTES # BLD AUTO: 0.02 X10(3)/MCL (ref 0–0.04)
IMM GRANULOCYTES NFR BLD AUTO: 0.3 %
LYMPHOCYTES # BLD AUTO: 1.09 X10(3)/MCL (ref 0.6–4.6)
LYMPHOCYTES NFR BLD AUTO: 17 %
MCH RBC QN AUTO: 31.1 PG (ref 27–31)
MCHC RBC AUTO-ENTMCNC: 34.3 G/DL (ref 33–36)
MCV RBC AUTO: 90.7 FL (ref 80–94)
MONOCYTES # BLD AUTO: 0.62 X10(3)/MCL (ref 0.1–1.3)
MONOCYTES NFR BLD AUTO: 9.7 %
NEUTROPHILS # BLD AUTO: 4.4 X10(3)/MCL (ref 2.1–9.2)
NEUTROPHILS NFR BLD AUTO: 68.5 %
NRBC BLD AUTO-RTO: 0 %
PLATELET # BLD AUTO: 251 X10(3)/MCL (ref 130–400)
PMV BLD AUTO: 8.3 FL (ref 7.4–10.4)
POTASSIUM SERPL-SCNC: 3.7 MMOL/L (ref 3.5–5.1)
PROT SERPL-MCNC: 7.4 GM/DL (ref 6.4–8.3)
RBC # BLD AUTO: 4.41 X10(6)/MCL (ref 4.7–6.1)
SODIUM SERPL-SCNC: 139 MMOL/L (ref 136–145)
T4 FREE SERPL-MCNC: 0.83 NG/DL (ref 0.7–1.48)
TSH SERPL-ACNC: 4.02 UIU/ML (ref 0.35–4.94)
WBC # BLD AUTO: 6.42 X10(3)/MCL (ref 4.5–11.5)

## 2025-04-02 PROCEDURE — 80053 COMPREHEN METABOLIC PANEL: CPT

## 2025-04-02 PROCEDURE — 85025 COMPLETE CBC W/AUTO DIFF WBC: CPT

## 2025-04-02 PROCEDURE — 36415 COLL VENOUS BLD VENIPUNCTURE: CPT

## 2025-04-02 PROCEDURE — 84443 ASSAY THYROID STIM HORMONE: CPT

## 2025-04-02 PROCEDURE — 84439 ASSAY OF FREE THYROXINE: CPT

## 2025-04-02 NOTE — PROGRESS NOTES
[facility] Hematology/Oncology  PROGRESS NOTE    Subjective:       Patient ID: Yaron Brennan is a 59 y.o. male.    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 Patient also stated that he hasn't been getting much sleep.)    HPI:  57-year-old gentleman     He was admitted to [facility] 07/2022 with 2 months history of progressive dysphagia, odynophagia, soreness of tongue, 20 lb weight loss and bilateral neck masses.  Also, hoarseness.  CT neck showed ulcerated mass in the tongue base, malignant-appearing, along with lymph node metastasis.  ENT performed a laryngoscopy with biopsy of tongue mass.  Friable base of tongue mass.  No airway compromise.  Right upper lung lobe indistinct, ground-glass lung nodules, typical of a benign inflammatory etiology.  Discharged 07/07/2022.  Patient reported weight loss of> 20 lb in previous 2 months.    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, p16 +:  -presentation: 07/2022:  Dysphagia, odynophagia, 20 lb weight loss, bilateral neck masses, hoarseness  -CT soft tissues of the neck without contrast 07/05/2022, 07/29/2022  -S/P panendoscopy 07/06/2022  -ulcerated tongue base mass, at least 5 cm  -ulcerated friable mass base of the tongue, confined base of tongue on panendoscopy  -right level 2 lymph node 3.2 x 2.9 cm on CT  -left level 3 lymph node 5.1 x 4.2 cm on CT  -invasive squamous cell carcinoma, grade 3 of 4; p16 +  -no intrathoracic metastasis on CT  chest  >>cT3 cN2 M0, clinical stage II  -08/25/2022:  High-dose cisplatin, concurrent with radiotherapy, started  -09/14/2022: Staging PET-CT (comparison:  CT soft tissues of the neck 07/29/2022):  1. Tongue base midline central necrotic ABD hypermetabolic mass, consistent with squamous cell carcinoma (4.2 x 2.4 cm, maximum SUV 10.02  2. Bilateral neck hypermetabolic enlarged lymph nodes are consistent with metastases (left neck level 3 lymph node, centrally necrotic sabrina complex, 4.6 x 4.4 cm, maximum SUV 5.27); left neck level 2 hypermetabolic enlarged lymph node, 2.2 x 1.8 cm, maximum SUV 7.05) (right neck level 2 central necrotic peripherally hypermetabolic enlarged lymph node, 3.0 x 2.4 cm, maximum SUV 4.27)  -09/15/2022:  CV ultrasound Doppler venous right upper extremity (pain and swelling right upper extremity):  + for DVT; extensive thrombus seen in right subclavian, axilla, and basilic veins; also, thrombus in proximal cephalic vein (started on Xarelto)  (The DVT was related to PICC line; it was removed by surgery on 09/15/2022; MediPort was unable to be placed)  -cycle 3 of high-dose cisplatin 10/11/2022  -S/P radiotherapy 08/24/2022-10/11/2022 (7000 cGy; 35 fractions; 48 elapsed days)  -11/21/2022: Cologuard +  -01/11/2023:  Restaging PET-CT (comparison:  Whole-body PET-CT 08/18/2022):  No residual primary tongue malignancy or cervical metastatic lymphadenopathy and no new metastatic disease identified at the neck or distantly        07/18/2022:  Presents for initial medical oncology consultation, accompanied by his wife.  Pleasant gentleman.  In no acute discomfort.  40 lb weight loss in last 3 months.  Appetite is preserved.  Dysphagia.  No odynophagia.  Pain left side of throat overall the lymphadenopathy as well as during swallowing.  No hemoptysis, nausea, vomiting.  No dyspnea. Left-sided otalgia.  ECOG 0.   Chronic tobacco and alcohol abuse (see above).  Bilateral cervical lymphadenopathy started  3 months ago; slowly progressive.  -no unusual headaches, loss of consciousness, seizures, stroke-like symptoms, fevers, or chills.  No abdominal pain, nausea, vomiting.    Past medical history:  Tobacco abuse since age 17. Intermittent alcohol use.  Procedure/surgical history: Back surgery (20-30 years ago, in use 10, apparently involving L5 fusion for prolapse this from work-related injury).  Social history:  Has been smoking a pack of cigarettes daily since age 17 years.  Has been drinking 6 beers daily for last 40 years.  Lately, trying to quit smoking and drinking.  Smokes marijuana.  .  Has 2 biological children of his own.  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  Lives in [place], [place]..  Family history:  Father  from some kind of cancer at age 72 years.  Health maintenance:  Does not have a regular primary care provider.  Does not visit with doctors.  No screening colonoscopy ever      Interval History:  Presents to clinic for a scheduled follow-up visit.  He reports doing well and denies any significant symptoms.  He continues to follow-up with ENT last visit was on 01/15/2025, next visit on 04/15/2025.  He denies any mouth sores/lesions, trouble swallowing, throat pain, shortness of breath, chest pain, unintentional weight loss, fever or signs of infection.    PMX/PSHx  Past Medical History:   Diagnosis Date    Alcohol use 2022    Cancer     Hypertension       Past Surgical History:   Procedure Laterality Date    BACK SURGERY      COLONOSCOPY  10/11/2023    DIRECT LARYNGOSCOPY N/A 2022    Procedure: LARYNGOSCOPY, DIRECT;  Surgeon: Mekhi Grossman MD;  Location: [facility] OR;  Service: ENT;  Laterality: N/A;    DIRECT LARYNGOSCOPY N/A 10/02/2023    Procedure: LARYNGOSCOPY, DIRECT;  Surgeon: Mekhi Grossman MD;  Location: [facility] OR;  Service: ENT;  Laterality: N/A;    EXTRACTION OF TOOTH N/A 2022    Procedure:  EXTRACTION, TEETH;  Surgeon: Mekhi Grossman MD;  Location: [facility];  Service: ENT;  Laterality: N/A;    SPINE SURGERY  2000    Not sure of date maybe in 2000     Allergies:  Review of patient's allergies indicates:  No Known Allergies   Social History:   Social History[1]  Medications:  Current Outpatient Medications   Medication Instructions    amLODIPine (NORVASC) 10 mg, Oral, Daily    melatonin (MELATIN) 6 mg, Oral, Nightly PRN    nabumetone (RELAFEN) 750 mg, Oral, 2 times daily PRN    ondansetron (ZOFRAN-ODT) 4 mg, Oral, Every 8 hours PRN    tiZANidine (ZANAFLEX) 4 mg, Oral, Every 8 hours PRN    traZODone (DESYREL) 100 mg, Oral, Nightly     ROS:  Review of Systems    Objective:   Vitals:  Vitals:    04/02/25 1304   BP: 135/84   Pulse: 81   Resp: 18   Temp: 98.2 °F (36.8 °C)      Wt Readings from Last 3 Encounters:   04/02/25 1304 81.2 kg (179 lb)   01/15/25 0813 78 kg (172 lb)   10/31/24 1224 78.9 kg (174 lb)      Physical Examination:  Physical Exam  ECOG SCORE           Labs:  Lab Visit on 04/02/2025   Component Date Value    Sodium 04/02/2025 139     Potassium 04/02/2025 3.7     Chloride 04/02/2025 107     CO2 04/02/2025 25     Glucose 04/02/2025 97     Blood Urea Nitrogen 04/02/2025 11.4     Creatinine 04/02/2025 1.08     Calcium 04/02/2025 8.6     Protein Total 04/02/2025 7.4     Albumin 04/02/2025 4.2     Globulin 04/02/2025 3.2     Albumin/Globulin Ratio 04/02/2025 1.3     Bilirubin Total 04/02/2025 0.4     ALP 04/02/2025 71     ALT 04/02/2025 11     AST 04/02/2025 21     eGFR 04/02/2025 >60     Anion Gap 04/02/2025 7.0     BUN/Creatinine Ratio 04/02/2025 11     TSH 04/02/2025 4.020     Thyroxine Free 04/02/2025 0.83     WBC 04/02/2025 6.42     RBC 04/02/2025 4.41 (L)     Hgb 04/02/2025 13.7 (L)     Hct 04/02/2025 40.0 (L)     MCV 04/02/2025 90.7     MCH 04/02/2025 31.1 (H)     MCHC 04/02/2025 34.3     RDW 04/02/2025 13.4     Platelet 04/02/2025 251     MPV 04/02/2025 8.3     Neut % 04/02/2025 68.5      Lymph % 04/02/2025 17.0     Mono % 04/02/2025 9.7     Eos % 04/02/2025 4.0     Basophil % 04/02/2025 0.5     Imm Grans % 04/02/2025 0.3     Neut # 04/02/2025 4.40     Lymph # 04/02/2025 1.09     Mono # 04/02/2025 0.62     Eos # 04/02/2025 0.26     Baso # 04/02/2025 0.03     Imm Gran # 04/02/2025 0.02     NRBC% 04/02/2025 0.0       Pathology:  -invasive squamous cell carcinoma, grade 3 of 4; p16 +   >>cT3 cN2 M0, clinical stage II     Radiology/Diagnostics:  -CT soft tissues of the neck without contrast 07/05/2022, 07/29/2022   -11/21/2022: Cologuard +  -01/11/2023: Restaging PET-CT:  Complete response  -CT neck 08/25/2023:  IVAN  -direct laryngoscopy 10/02/2023:  (preop diagnosis: Epiglottic mass):  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  Assessment:   Squamous cell carcinoma of base of tongue  Continue follow up with ENT, 04/15/2025 & 07/15/2025  Problem List Items Addressed This Visit       Squamous cell carcinoma of base of tongue - Primary         Plan:    04/02/2025  Labs and exam stable  Continue follow up with ENT, 04/15/2025 & 07/15/2025  Follow up with internal medicine 04/30/2025  RTC in 6 months labs/NP  CBC MP TSH Free T4       Providers:         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, p16 +:  -presentation: 07/2022:  Dysphagia, odynophagia, 20 lb weight loss, bilateral neck masses, hoarseness  -CT soft tissues of the neck without contrast 07/05/2022, 07/29/2022  -S/P panendoscopy 07/06/2022  -ulcerated tongue base mass, at least 5 cm  -ulcerated friable mass base of the tongue, confined base of tongue on  panendoscopy  -right level 2 lymph node 3.2 x 2.9 cm on CT, 3.0 x 2.4 cm on PET-CT  -left level 3 lymph node 5.1 x 4.2 cm on CT, 4.6 x 4.4 cm on PET-CT  -left neck level 2 lymph node, 2.2 x 1.8 cm on PET-CT  -invasive squamous cell carcinoma, grade 3 of 4; p16 +  -no intrathoracic metastasis on CT chest  >>cT3 cN2 M0, clinical stage II  -plan: Chemoradiation therapy  -S/P high-dose cisplatin every 3 weeks x3 (08/25/2022-10/11/2022)  -S/P radiotherapy 08/24/2022-10/11/2022 (7000 cGy; 35 fractions; 48 elapsed days)  -11/21/2022: Cologuard +  -01/11/2023: Restaging PET-CT:  Complete response  -CT neck 08/25/2023:  IVAN  -direct laryngoscopy 10/02/2023:  (preop diagnosis: Epiglottic mass):  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 [biopsy of epiglottic lesion:  Negative for malignancy   -10/11/2023:  Colonoscopy (positive Cologuard) (Joseph Louise MD):  Second-degree hemorrhoids; repeat colonoscopy in 10 years  -CT neck 02/22/2024:  IVAN  -02/29/2024:  Left hard palate, biopsy:  Squamous papilloma; 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; negative for malignancy  -02/21/2024: TSH 2.718, normal       PICC line related DVT right subclavian, axillary, and basilic veins (09/15/2022):   -presentation:  Pain and swelling right upper extremity  -anticoagulation with Xarelto started 09/15/2022        # history of tobacco abuse since age 17  # history of alcohol use        Plan:  Continue to follow-up with ENT  Follow-up with NP in 6 months with CBC, CMP, TSH, free T4.  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 [biopsy of epiglottic lesion:  Negative  for malignancy   -CT neck 02/22/2024:  IVAN  -02/29/2024:  Left hard palate, biopsy:  Squamous papilloma; 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; negative for malignancy  -02/21/2024: TSH 2.718, normal  >>>  Continue surveillance with ENT  We will follow him peripherally  Follow-up in 6 months, with CBC, CMP, and TSH     -developed extensive DVT right upper extremity, including subclavian vein, axillary vein, and basilic vein 09/15/2022, secondary to PICC line  -right-sided PICC line removed; MediPort was unable to be placed; chemotherapy is being continued with PICC line on the left side   -anticoagulation with Xarelto started 09/15/2022; planned to continue for 3 months (until middle/end of December 2022)  (He stopped Xarelto 12/15/2022, appropriately so)     -11/21/2022: Cologuard +  -10/11/2023:  Colonoscopy (positive Cologuard) (Joseph Louise MD):  Second-degree hemorrhoids; repeat colonoscopy in 10 years  >>>  -repeat colonoscopy in 10 years     -abstinence from smoking and alcohol discussed once again and strongly encouraged.  -03/18/2024: Tells me that he quit smoking tobacco and drinking alcohol when he was diagnosed with throat cancer; however, does smoke marijuana daily, and aspirin smoke anymore last 20 years at least.     Follow-up in 6 months with CBC, CMP, and TSH level; to see NP   In the interim, close follow-up with ENT per schedule.    Above discussed with him.  All questions answered.    Discussed labs and scans and gave him copies of relevant records.  He and his wife understand and agree with this plan.           I have explained all of the above in detail and the patient understands all of the current recommendation(s). I have answered all of their questions to the best of my ability and to their complete satisfaction.       Mita Wheeler, FNP-C  Oncology/Hematology   [facility]           [1]   Social History  Tobacco Use    Smoking status: Former     Current packs/day: 0.00     Types: Cigarettes      Start date: 1982     Quit date: 2022     Years since quittin.7    Smokeless tobacco: Never    Tobacco comments:     Been almost 35 yr   Substance Use Topics    Alcohol use: Not Currently    Drug use: Yes     Types: Marijuana     Comment: Daily

## 2025-04-15 ENCOUNTER — OFFICE VISIT (OUTPATIENT)
Dept: OTOLARYNGOLOGY | Facility: CLINIC | Age: 60
End: 2025-04-15
Payer: MEDICARE

## 2025-04-15 VITALS
WEIGHT: 178 LBS | DIASTOLIC BLOOD PRESSURE: 98 MMHG | TEMPERATURE: 98 F | HEART RATE: 84 BPM | SYSTOLIC BLOOD PRESSURE: 156 MMHG | BODY MASS INDEX: 25.54 KG/M2

## 2025-04-15 DIAGNOSIS — H53.8 BLURRY VISION, BILATERAL: ICD-10-CM

## 2025-04-15 DIAGNOSIS — C76.0 HEAD AND NECK MALIGNANCY: ICD-10-CM

## 2025-04-15 DIAGNOSIS — C01 SQUAMOUS CELL CARCINOMA OF BASE OF TONGUE: Primary | ICD-10-CM

## 2025-04-15 DIAGNOSIS — Z92.3 HISTORY OF RADIATION TO HEAD AND NECK REGION: ICD-10-CM

## 2025-04-15 PROCEDURE — 99214 OFFICE O/P EST MOD 30 MIN: CPT | Mod: PBBFAC | Performed by: STUDENT IN AN ORGANIZED HEALTH CARE EDUCATION/TRAINING PROGRAM

## 2025-04-15 PROCEDURE — 31575 DIAGNOSTIC LARYNGOSCOPY: CPT | Mod: PBBFAC | Performed by: STUDENT IN AN ORGANIZED HEALTH CARE EDUCATION/TRAINING PROGRAM

## 2025-04-15 RX ORDER — LIDOCAINE HYDROCHLORIDE 40 MG/ML
1 INJECTION, SOLUTION RETROBULBAR
Status: SHIPPED | OUTPATIENT
Start: 2025-04-15

## 2025-04-15 NOTE — PROGRESS NOTES
[facility] and [facility]  Otolaryngology Clinic Note    Yaron Brennan  Encounter Date: 4/15/2025  YOB: 1965  Physician: Sue Keller    Chief Complaint: Otalgia, neck mass    HPI: Yaron Brennan is a 59 y.o. male with HTN who presents as referral from Dr. Welsh for squamous cell carcinoma of base of tongue. Patient reports that toward the beginning of the year he started to have worsening left otalgia. He then noticed a bump/swelling on left neck that he thought was was due to a bug bite. This got progressively bigger over time and he then noticed some swelling on right neck as well. He eventually presented to [facility] in early July 2022. He had a CT neck that showed an ulcerated mass in the tongue base, and underwent direct laryngoscopy with ENT on 7/6/22 (Dr. Tay). Operative findings: ulcerated friable mass base of the tongue that seems confined to base of tongue; lingual surface of epiglottis is free from tumor; mass involves bilateral base of tongue, significantly midline. Results returned positive for p16+ SCCa. Today in clinic patient reports bilateral L>R otalgia, mild dysphagia/odynophagia. He reports he eats basically whatever he wants without overt signs of aspiration. He does report 40lb weight loss in the last 6 months. + Neck LAD. Reports voice changes. Denies any issues breathing. Smoked 1ppd since 17 years old (42 years) but quit a month ago when he got his diagnosis. Also previously drank 12 pack of beer a day and likewise quite a month ago. Referred to ENT for consideration of possible surgical resection.     07/05/2022: CT soft tissue of the neck without contrast:  -ulcerated tongue base mass, at least 3.4 cm by 3.0 cm x 3.1 cm; associated bulky level 2 and level 3 cervical lymphadenopathy; suspected level 1 and level 4 cervical lymphadenopathy; a right level 2 lymph node with central necrosis 2.6 x 3.3 cm; a left level 3 lymph node 4.5 x 3.2 cm; no  significant airway compromise; no acute or aggressive bony lesions  -malignant ulcerated type mass with lymph node metastasis; no airway compromise; right upper lung lobe indistinct ground-glass pulmonary nodules typical of benign infectious or inflammatory etiology    8/16/22: Here today for follow up. Had dental extractions performed on 8/5. Not having any issues. Taking in 4-5 ensures daily. Trying to take some other soft foods like grits and pudding.     9/22/22: Here for follow up. Patient is undergoing CRT and tolerating it well. He reports he has one more chemotherapy session left and about 2-3 weeks of radiation left. Overall maintaining his weight. No progressive dysphagia, swallowing fine. Also no breathing issues or other complaints.    11/3/22: Patient is here today for follow up.  He finished CRT on 10/10.  Patient states he is still in some pain in  his neck and inside of his mouth.   He is tolerating p.o. intake but states at times he does have difficulty swallowing.  He does have xerostomia.  Patient has not used his eardrops to loosen the cerumen impaction.  Denies any shortness of breath or difficulty breathing/ changes to voice,  changes to his neck masses.    12/7/22:  Patient returns for surveillance today.  Patient denies any new dysphagia, odynophagia, H&N lumps or bumps, otalgia, weight loss.  Overall pain is getting better, tolerating diet, gaining weight.    03/14/2023 doing well, no pain, no otalgia, dysphagia has improved significantly, weight is stable.  No hemoptysis.  Voice remains hoarse.  No airway distress.    4/11/2023: Returns today for follow-up. Reports doing well overall. No dysphagia, swallowing all foods without issue. Voice stable. No odynophagia, no SOB, no otalgia. Does note feeling like ears are clogged, has tried ear drops without success. Did get TSH since last visit, wnl.    5/18/23: Patient here today for follow up. Doing well since last visit. No major concerns.  Denies weight loss, new lumps/bumps, sore throat, dysphagia, odynophagia, or otalgia. Having some pain when chewing. Does not have dentures.     6/13/23: Returns today for follow-up/surveillance. Patient reports doing well overall. No issues with dysphagia, odynophagia, voice changes, otalgia, or weight loss. Still looking for dentist to get dentures made. Did get CT neck since last visit, but CXR and TSH still pending.     7/19/23: Here for surveillance. He is doing well. He is swallowing food of all consistencies without issue - no coughing or choking. He denies voice changes, weight loss, pain, or new lumps/bumps. He is breathing comfortably and active. Having trouble with sleep at night.    8/16/23: He has been doing very well since his last visit no issues at all.  He has eating a regular diet and no issues swallowing.  No voice changes lumps or bumps in her neck.    9/19/23:  Here today for follow-up after CT.  No issues in the interim.  Dysphagia and odynophagia are stable: Able to eat most things except for tough meat.  Weight has been stable.  No noted lumps/bumps.    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  Patient has done well postoperatively.  He still has a mild sore throat but this is improving.  He has no respiratory distress or dysphagia.  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  There was no evidence of malignancy.  Results were discussed with the patient.  Patient has no new problems today.    December 19, 2023:   Patient presents today for routine surveillance.  Patient is doing well.  He is tolerating his diet.  His only complaint is that of some xerostomia but this is not a significant problem.  He has no complaints of sore throat, ear pain, swelling in the neck, or difficulty  swallowing.  He has no other new problems today.    2/8/2024:   Patient presents today for routine follow-up.  Patient is doing well.  He had been seen by Dr. Leavitt, radiation Oncology, last week who found the patient to be without evident disease.  Patient continues to do well.  He has no complaints of sore throat, ear pain, difficulty swallowing, or swelling in the neck.  No hemoptysis.  Appetite has been good.  He has no other new problems today.    2/29/24: here today for follow up. No new issues. Denies sore throat, otalgia, dysphagia, or neck lumps/bumps. Has been maintaining weight with good appetite.     4/30/2024:  Here today for follow up.  No new issues.  Denies sore throat, otalgia, weight loss, or new lumps or bumps in his neck. Weight stable.     7/31/24:  Here for follow-up.  Reports occasional hoarseness otherwise no odynophagia dysphagia otalgia lumps or bumps.  Changes in weight, doing well    10/31/24: Doing well with no issues. No new lumps/bumps in neck. No ear pain, dysphagia, odynophagia, or weight loss. No complaints.     1/15/2025:  Returns in follow-up.  Denies any symptoms concerning for recurrence including odynophagia, dysphagia, ear pain, or new lumps or bumps.  His weight has been stable.  No concerns at this time.    4/15/2025:  Returns for surveillance patient reports he has been doing well since we last saw him.  He is keeping up his weight he has not having too much trouble swallowing unless it is Rice in he resolves this was drinking plenty of water.  Only thing is patient needs a new pair of glasses and was discharge from his last eye doctor due to insurance.  He denies any ear pain, throat pain, voice changes, new lumps or bumps    Review of patient's allergies indicates:  No Known Allergies    Past Medical History:   Diagnosis Date    Alcohol use 07/16/2022    Cancer     Hypertension        Past Surgical History:   Procedure Laterality Date    BACK SURGERY      COLONOSCOPY   10/11/2023    DIRECT LARYNGOSCOPY N/A 2022    Procedure: LARYNGOSCOPY, DIRECT;  Surgeon: Mekhi Grossman MD;  Location: [facility] OR;  Service: ENT;  Laterality: N/A;    DIRECT LARYNGOSCOPY N/A 10/02/2023    Procedure: LARYNGOSCOPY, DIRECT;  Surgeon: Mekhi Grossman MD;  Location: [facility] OR;  Service: ENT;  Laterality: N/A;    EXTRACTION OF TOOTH N/A 2022    Procedure: EXTRACTION, TEETH;  Surgeon: Mekhi Grossman MD;  Location: [facility] OR;  Service: ENT;  Laterality: N/A;    SPINE SURGERY      Not sure of date maybe in        Social History     Socioeconomic History    Marital status:      Spouse name: Blanca   Occupational History    Occupation: Disabled   Tobacco Use    Smoking status: Former     Current packs/day: 0.00     Types: Cigarettes     Start date: 1982     Quit date: 2022     Years since quittin.7    Smokeless tobacco: Never    Tobacco comments:     Been almost 35 yr   Substance and Sexual Activity    Alcohol use: Not Currently    Drug use: Yes     Types: Marijuana     Comment: Daily    Sexual activity: Yes     Partners: Female     Birth control/protection: None     Social Drivers of Health     Financial Resource Strain: High Risk (2024)    Overall Financial Resource Strain (CARDIA)     Difficulty of Paying Living Expenses: Very hard   Food Insecurity: No Food Insecurity (2024)    Hunger Vital Sign     Worried About Running Out of Food in the Last Year: Never true     Ran Out of Food in the Last Year: Never true   Transportation Needs: No Transportation Needs (2024)    PRAPARE - Transportation     Lack of Transportation (Medical): No     Lack of Transportation (Non-Medical): No   Physical Activity: Insufficiently Active (2024)    Exercise Vital Sign     Days of Exercise per Week: 4 days     Minutes of Exercise per Session: 30 min   Stress: No Stress Concern Present (2024)    [employer] - Occupational Stress  Questionnaire     Feeling of Stress : Only a little   Housing Stability: Patient Declined (1/1/2024)    Housing Stability Vital Sign     Unable to Pay for Housing in the Last Year: Patient declined     Unstable Housing in the Last Year: Patient declined       Family History   Problem Relation Name Age of Onset    Hypertension Mother Chayo Mary     Cancer Father Yaron Herrera Sr     Hypertension Sister Martha     Hypertension Brother Kevin        Outpatient Encounter Medications as of 4/15/2025   Medication Sig Dispense Refill    amLODIPine (NORVASC) 10 MG tablet Take 1 tablet (10 mg total) by mouth once daily. 90 tablet 2    melatonin (MELATIN) 3 mg tablet Take 2 tablets (6 mg total) by mouth nightly as needed for Insomnia. 30 tablet 0    nabumetone (RELAFEN) 750 MG tablet Take 1 tablet (750 mg total) by mouth 2 (two) times daily as needed for Pain. 30 tablet 6    ondansetron (ZOFRAN-ODT) 4 MG TbDL Take 1 tablet (4 mg total) by mouth every 8 (eight) hours as needed (nausea). 15 tablet 0    tiZANidine (ZANAFLEX) 4 MG tablet Take 1 tablet (4 mg total) by mouth every 8 (eight) hours as needed (muscle pain or spasm). 30 tablet 6    traZODone (DESYREL) 100 MG tablet Take 1 tablet (100 mg total) by mouth every evening. 90 tablet 2     Facility-Administered Encounter Medications as of 4/15/2025   Medication Dose Route Frequency Provider Last Rate Last Admin    lactated ringers infusion   Intravenous Continuous Elzbieta Quesada MD 10 mL/hr at 08/05/22 0808 New Bag at 08/05/22 0808    LIDOcaine (PF) 10 mg/ml (1%) injection 10 mg  1 mL Intradermal Once Yajaira Watts FNP        LIDOcaine (PF) 10 mg/ml (1%) injection 10 mg  1 mL Intradermal Once Yajaira Watts FNP        LIDOcaine (PF) 40 mg/mL (4 %) injection 1 mL  1 mL Other 1 time in Clinic/HOD Mekhi Grossman MD        phenylephrine HCL 1% nasal spray 1 spray  1 spray Each Nostril 1 time in Clinic/HOD Mekhi Grossman MD        sodium  chloride 0.9% flush 10 mL  10 mL Intravenous PRN Anna Taylor MD        sodium chloride 0.9% flush 10 mL  10 mL Intravenous PRN Jose Johns MD           Physical Exam:  BP (!) 156/98 (BP Location: Left arm, Patient Position: Sitting) Comment: patient states he took blood pressure meds this morning. also had two cups of coffee. states his pressure goes up and down  Pulse 84   Temp 98.1 °F (36.7 °C) (Oral)   Wt 80.7 kg (178 lb)   BMI 25.54 kg/m²     General:  Well-developed well-nourished male in no acute distress.  Voice is normal.  Head and face:  Normocephalic.  No facial lesions.  No temporomandibular joint tenderness or click.  Ears:    Right ear-auricle is normally developed.  External auditory canal is clear.  Tympanic membrane is nonerythematous.  No middle ear effusion.   Left ear-auricle is normally developed.  External auditory canal is clear.  Tympanic membrane is nonerythematous.  No middle ear effusion.  Nose:  Nasal dorsum is unremarkable.  No significant septal deviation.  No significant intranasal congestion.  Oral cavity and oropharynx:  Tongue and floor mouth are without lesions.  Base of tongue is soft  Mucosa is moist.  No pharyngeal erythema or exudates.  No oropharyngeal masses.  No tonsillar hypertrophy.   Neck:  Supple without adenopathy or thyromegaly.  Trachea is in the midline.  Parotid and submandibular glands are normal to palpation without masses or tenderness.  Eyes:  Extraocular muscles intact.  No nystagmus.  No exophthalmos or enophthalmos.  Neurologic:  Alert and oriented.  Cranial nerves II-XII are grossly normal.    PROCEDURE: Flexible fiberoptic laryngoscopy    Surgeon: Sue Keller   Anesthesia: None  Complications: None  Date: 04/15/2025  Indication: hx of cancer    Procedure in Detail:    Informed consent was obtained from the patient after explanation of procedure, indications, risks and benefits. Flexible laryngoscopy was performed on Yaron Brennan through the  right nasal passage(s). The nasal cavity, nasopharynx, oropharynx, hypopharynx and larynx were adequately visualized. The true vocal cords and arytenoids were examined during phonation and repose.    Operative Findings:    Nasal Cavity:  nasal airway patent without lesions or ulcerations  Nasopharynx: No adenoid hypertrophy, no masses or ulcerations noted in the fossae of Rosenmüller, patent appearing mariel tubarii  Tongue Base: No fullness or lingual tonsillar hypertrophy. No masses.  Pharyngeal Walls: No lesions or ulcerations noted  Epiglottis / Aryepiglottic Folds: Crisp epiglottis without lesions, normal-appearing aryepiglottic folds without lesions.   Arytenoids: Full symmetric range of motion bilaterally, no lesions  Piriform Sinus: No lesions or masses noted  Vocal Cords: Symmetric movement bilaterally with good glottic closure, patent airway during inspiration and expiration, no lesions or masses      The patient tolerated the procedure well without any complications.    Impression: Radiation changes with no identifiable malignancy.  Dry mucosa with mucus.    Pertinent Data:  CT  Neck:  Impression:  Post treatment changes of the base of tongue with no evidence of recurrent neoplasm.  No pathologic adenopathy.  Unchanged when compared to 02/22/2024      TSH 04/02/2025 within normal limits    Assessment:  Yaron Brennan is a 59 y.o. 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 CRT, finished 10/10/22.  Most recent CT (2/22/24) without evidence of disease. PET 1/11/23 without disease (in [facility] system), TSH wnl.     IVAN today.     Plan:   - TSH is wnl  - patient due for chest x-ray and CT neck in August of 2025  - referral sent for Ophthalmology today  - referral given to patient for dentist  - return to clinic 3 months    Sue Keller MD  [employer] [facility]  [facility]        HEAD & NECK CANCER SURVEILLANCE   Radiation  and/or Chemotherapy     Medical Oncologist: Kevon Welsh MD  Radiation Oncologist: Unknown    Primary tumor: cT3 cN2 M0 p16+ base of tongue    Date of Diagnosis: 7/6/22  Therapy: Cisplatin 100 mg/m² IV days 1, 22, and 23, concurrent with RT  Completion Date: 10/10/22    Pertinent Treatment History:   First presentation 7/2022  CT neck 7/5/22 & 7/29/22  Panendoscopy 7/6/22  Right UE DVT 2/2 PICC 9/15/22 on Xarelto  CRT 8/25/22 - 10/10/22  Direct laryngoscopy with biopsy of benign laryngeal ductal cyst on October 2, 2023.    Place date if completed   3 6 12 18 24 36 48 60   CT Neck 1/31/23 6/8/23 8/25/23 2/22/24 8/30/24 X X X   PET/CT 1/11/23          CXR  7/17/23 X X 8/30/24 X X X   TFT 2/2/23 7/20/23 X  X X X X     Current Surveillance Interval: post-tx year 2-3, q3-4 mo         Answers submitted by the patient for this visit:  Review of Symptoms Questionnaire  (Submitted on 4/14/2025)  None of these: Yes  sinus pressure : Yes  eye itching: Yes  Sleep Apnea?: Yes  None of these : Yes  heartburn: Yes  Acid Reflux?: Yes  Muscle aches / pain?: Yes  back pain: Yes  None of these : Yes  Seasonal Allergies?: Yes  None of these : Yes  Light-headedness: Yes  None of these: Yes  sleep disturbance: Yes

## 2025-04-15 NOTE — PROGRESS NOTES
Answers submitted by the patient for this visit:  Review of Symptoms Questionnaire  (Submitted on 4/14/2025)  None of these: Yes  sinus pressure : Yes  eye itching: Yes  Sleep Apnea?: Yes  None of these : Yes  heartburn: Yes  Acid Reflux?: Yes  Muscle aches / pain?: Yes  back pain: Yes  None of these : Yes  Seasonal Allergies?: Yes  None of these : Yes  Light-headedness: Yes  None of these: Yes  sleep disturbance: Yes  The scope used for the exam was:  Flexible scope ENF-P4  Serial Number:  1)    [id number]    []   2)    [id number]    [x]   3)    [id number]    []   4)    [id number]    []   5)    [id number]    []   6)    [id number]    []     7)    [id number]     []     8)    [id number]     []     9)    [id number]     []    10)  [id number]      []       The scope used for the exam was:  Rigid scope   Serial Number:  1)   [id number]    []   2)   [id number]    []   3)   [id number]    []   4)    3384   []   5)    0824   []   6)    5554   []     7)   7425    []   8)   2240    []   9)   1109    []

## 2025-04-20 DIAGNOSIS — M51.372 DEGENERATION OF INTERVERTEBRAL DISC OF LUMBOSACRAL REGION WITH DISCOGENIC BACK PAIN AND LOWER EXTREMITY PAIN: ICD-10-CM

## 2025-04-20 DIAGNOSIS — I10 PRIMARY HYPERTENSION: Chronic | ICD-10-CM

## 2025-04-20 RX ORDER — AMLODIPINE BESYLATE 10 MG/1
10 TABLET ORAL DAILY
Qty: 90 TABLET | Refills: 2 | Status: CANCELLED | OUTPATIENT
Start: 2025-04-20

## 2025-04-20 RX ORDER — NABUMETONE 750 MG/1
750 TABLET, FILM COATED ORAL 2 TIMES DAILY PRN
Qty: 30 TABLET | Refills: 6 | Status: CANCELLED | OUTPATIENT
Start: 2025-04-20

## 2025-05-28 ENCOUNTER — OFFICE VISIT (OUTPATIENT)
Dept: INTERNAL MEDICINE | Facility: CLINIC | Age: 60
End: 2025-05-28
Payer: MEDICARE

## 2025-05-28 VITALS
HEIGHT: 70 IN | HEART RATE: 87 BPM | TEMPERATURE: 98 F | RESPIRATION RATE: 16 BRPM | BODY MASS INDEX: 25.36 KG/M2 | DIASTOLIC BLOOD PRESSURE: 88 MMHG | SYSTOLIC BLOOD PRESSURE: 132 MMHG | WEIGHT: 177.13 LBS | OXYGEN SATURATION: 100 %

## 2025-05-28 DIAGNOSIS — I10 PRIMARY HYPERTENSION: Chronic | ICD-10-CM

## 2025-05-28 DIAGNOSIS — Z00.00 WELL ADULT EXAM: Primary | ICD-10-CM

## 2025-05-28 DIAGNOSIS — G47.09 OTHER INSOMNIA: ICD-10-CM

## 2025-05-28 PROCEDURE — 99213 OFFICE O/P EST LOW 20 MIN: CPT | Mod: PBBFAC

## 2025-05-28 RX ORDER — AMLODIPINE BESYLATE 10 MG/1
10 TABLET ORAL DAILY
Qty: 90 TABLET | Refills: 2 | Status: SHIPPED | OUTPATIENT
Start: 2025-05-28 | End: 2026-02-22

## 2025-05-28 RX ORDER — TRAZODONE HYDROCHLORIDE 100 MG/1
100 TABLET ORAL NIGHTLY
Qty: 90 TABLET | Refills: 2 | Status: SHIPPED | OUTPATIENT
Start: 2025-05-28 | End: 2026-02-22

## 2025-05-28 NOTE — ASSESSMENT & PLAN NOTE
Continue trazadone 100mg nightly  Avoid caffeine, alcohol and stimulants.  Do not use illicit drugs.  Practice positive phrases and repeat throughout the day, lavender scents or Chamomile tea to promote relaxation.  Set healthy boundaries, avoid people and conversations that increase stress.  Power down electronic devices at least one hour prior to bedtime.  Keep room dark; use eye mask or relaxation sound machine to promote rest.  Sleep hygiene refers to actions that tend to improve and maintain good sleep:  Sleep as long as necessary to feel rested (usually seven to eight hours for adults) and then get out of bed  Maintain a regular sleep schedule, particularly a regular wake-up time in the morning  Avoid smoking or other nicotine intake, particularly during the evening  Avoid daytime naps, especially if they are longer than 20 to 30 minutes or occur late in the day.

## 2025-05-28 NOTE — ASSESSMENT & PLAN NOTE
Pt wellness visit completed today with appropriate lab work.   HM Topics reviewed and updated  Immunizations Discussed- needs to obtain at pharmacy  Discussed healthy diet and encouraged to exercise routinely  Encouraged adequate water intake  Eat more fruits and vegetables  Avoid soda & alcohol

## 2025-05-28 NOTE — ASSESSMENT & PLAN NOTE
Borderline, keep home log and follow up next week  Continue amlodipine 10mg  Low Sodium Diet (DASH Diet - Less than 2 grams of sodium per day).  Monitor blood pressure daily and log. Report consistent numbers greater than 140/90.  Maintain healthy weight with goal BMI <30. Exercise 30 minutes per day, 5 days per week.

## 2025-05-28 NOTE — PROGRESS NOTES
SANDY Beal   [facility]  [address] [facility] [address]      PATIENT NAME: Yaron Brennan  : 1965  DATE: 25  MRN: [id number]      Patient PCP Information       Provider PCP Type    SANDY Beal General            Reason for Visit / Chief Complaint: Follow-up (Takes medications as prescribed, labs incomplete has concerns of elevated bp every other day )       History of Present Illness / Problem Focused Workflow     Yaron Brennan presents to the clinic with Follow-up (Takes medications as prescribed, labs incomplete has concerns of elevated bp every other day )     59 y.o.  male presents to the clinic. 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 Followed by [facility] oncology and ENT clinics.     10/30/24  Pt presents for HTN and LBP follow up. Pt continues to endorse back pain, he was referred to pain management earlier this year but reports he did not receive a call to [facility]. Re-referred today and provided pt with number to call if he does not receive a call within a couple weeks. Blood pressure is at goal, he complaint with medications. Meds reviewed and refilled, agreeable to flu vaccine today. No acute complaints.     25  Pt presents for routine follow up, labs incomplete. Blood pressure borderline in clinic, reports home readings are sometimes elevated. He can't remember exact numbers but he thinks mostly 130s/80s. He is compliant with daily amlodipine. Will keep log over next week to review. He will also complete fasting labs in the next week for virtual follow up next week. No acute complaints, requesting med refills.           Review of Systems     Review of Systems   Constitutional:  Negative for fatigue, fever and unexpected weight change.   HENT:   "Negative for ear pain, hearing loss, trouble swallowing and voice change.    Respiratory:  Negative for cough and shortness of breath.    Cardiovascular:  Negative for chest pain and palpitations.   Gastrointestinal:  Negative for abdominal pain, diarrhea and vomiting.   Genitourinary:  Negative for dysuria.   Musculoskeletal:  Negative for gait problem.   Skin:  Negative for rash and wound.   Neurological:  Negative for weakness.   Psychiatric/Behavioral:  Negative for suicidal ideas.          Medications and Allergies     Medications  Current Outpatient Medications   Medication Instructions    amLODIPine (NORVASC) 10 mg, Oral, Daily    melatonin (MELATIN) 6 mg, Oral, Nightly PRN    nabumetone (RELAFEN) 750 mg, Oral, 2 times daily PRN    ondansetron (ZOFRAN-ODT) 4 mg, Oral, Every 8 hours PRN    tiZANidine (ZANAFLEX) 4 mg, Oral, Every 8 hours PRN    traZODone (DESYREL) 100 mg, Oral, Nightly         Allergies  Review of patient's allergies indicates:  No Known Allergies    Physical Examination     Visit Vitals  /88 (BP Location: Left arm, Patient Position: Sitting)   Pulse 87   Temp 98 °F (36.7 °C) (Oral)   Resp 16   Ht 5' 10" (1.778 m)   Wt 80.3 kg (177 lb 1.6 oz)   SpO2 100%   BMI 25.41 kg/m²       Physical Exam  Vitals and nursing note reviewed.   Constitutional:       General: He is not in acute distress.     Appearance: He is not ill-appearing.   HENT:      Head: Normocephalic and atraumatic.      Mouth/Throat:      Mouth: Mucous membranes are moist.      Pharynx: Oropharynx is clear.   Eyes:      General: No scleral icterus.     Extraocular Movements: Extraocular movements intact.      Conjunctiva/sclera: Conjunctivae normal.      Pupils: Pupils are equal, round, and reactive to light.   Neck:      Vascular: No carotid bruit.   Cardiovascular:      Rate and Rhythm: Normal rate and regular rhythm.      Heart sounds: No murmur heard.     No friction rub. No gallop.   Pulmonary:      Effort: Pulmonary effort " is normal. No respiratory distress.      Breath sounds: Normal breath sounds. No wheezing, rhonchi or rales.   Abdominal:      General: Abdomen is flat. Bowel sounds are normal. There is no distension.      Palpations: Abdomen is soft. There is no mass.      Tenderness: There is no abdominal tenderness.   Musculoskeletal:         General: Normal range of motion.      Cervical back: Normal range of motion and neck supple.   Skin:     General: Skin is warm and dry.   Neurological:      General: No focal deficit present.      Mental Status: He is alert.   Psychiatric:         Mood and Affect: Mood normal.           Results     Lab Results   Component Value Date    WBC 6.42 04/02/2025    RBC 4.41 (L) 04/02/2025    HGB 13.7 (L) 04/02/2025    HCT 40.0 (L) 04/02/2025    MCV 90.7 04/02/2025    MCH 31.1 (H) 04/02/2025    MCHC 34.3 04/02/2025    RDW 13.4 04/02/2025     04/02/2025    MPV 8.3 04/02/2025     CMP  Sodium   Date Value Ref Range Status   04/02/2025 139 136 - 145 mmol/L Final     Potassium   Date Value Ref Range Status   04/02/2025 3.7 3.5 - 5.1 mmol/L Final     Chloride   Date Value Ref Range Status   04/02/2025 107 98 - 107 mmol/L Final     CO2   Date Value Ref Range Status   04/02/2025 25 22 - 29 mmol/L Final     Glucose   Date Value Ref Range Status   04/02/2025 97 74 - 100 mg/dL Final     Blood Urea Nitrogen   Date Value Ref Range Status   04/02/2025 11.4 8.4 - 25.7 mg/dL Final     Creatinine   Date Value Ref Range Status   04/02/2025 1.08 0.72 - 1.25 mg/dL Final     Calcium   Date Value Ref Range Status   04/02/2025 8.6 8.4 - 10.2 mg/dL Final     Protein Total   Date Value Ref Range Status   04/02/2025 7.4 6.4 - 8.3 gm/dL Final     Albumin   Date Value Ref Range Status   04/02/2025 4.2 3.5 - 5.0 g/dL Final     Bilirubin Total   Date Value Ref Range Status   04/02/2025 0.4 <=1.5 mg/dL Final     ALP   Date Value Ref Range Status   04/02/2025 71 40 - 150 unit/L Final     AST   Date Value Ref Range Status    04/02/2025 21 11 - 45 unit/L Final     ALT   Date Value Ref Range Status   04/02/2025 11 0 - 55 unit/L Final     Estimated GFR-Non    Date Value Ref Range Status   07/18/2022 >60 mls/min/1.73/m2 Final     Lab Results   Component Value Date    CHOL 203 (H) 12/01/2023     Lab Results   Component Value Date    HDL 50 12/01/2023     Lab Results   Component Value Date    TRIG 66 12/01/2023     Lab Results   Component Value Date    VLDL 13 12/01/2023     Lab Results   Component Value Date    .00 12/01/2023     Lab Results   Component Value Date    TSH 4.020 04/02/2025         Assessment        ICD-10-CM ICD-9-CM   1. Well adult exam  Z00.00 V70.0   2. Other insomnia  G47.09 780.52   3. Primary hypertension  I10 401.9        Plan      Problem List Items Addressed This Visit       Well adult exam - Primary    Current Assessment & Plan   Pt wellness visit completed today with appropriate lab work.    Topics reviewed and updated  Immunizations Discussed- needs to obtain at pharmacy  Discussed healthy diet and encouraged to exercise routinely  Encouraged adequate water intake  Eat more fruits and vegetables  Avoid soda & alcohol           Primary hypertension (Chronic)    Current Assessment & Plan   Borderline, keep home log and follow up next week  Continue amlodipine 10mg  Low Sodium Diet (DASH Diet - Less than 2 grams of sodium per day).  Monitor blood pressure daily and log. Report consistent numbers greater than 140/90.  Maintain healthy weight with goal BMI <30. Exercise 30 minutes per day, 5 days per week.         Relevant Medications    amLODIPine (NORVASC) 10 MG tablet    Other insomnia    Current Assessment & Plan   Continue trazadone 100mg nightly  Avoid caffeine, alcohol and stimulants.  Do not use illicit drugs.  Practice positive phrases and repeat throughout the day, lavender scents or Chamomile tea to promote relaxation.  Set healthy boundaries, avoid people and conversations that  increase stress.  Power down electronic devices at least one hour prior to bedtime.  Keep room dark; use eye mask or relaxation sound machine to promote rest.  Sleep hygiene refers to actions that tend to improve and maintain good sleep:  Sleep as long as necessary to feel rested (usually seven to eight hours for adults) and then get out of bed  Maintain a regular sleep schedule, particularly a regular wake-up time in the morning  Avoid smoking or other nicotine intake, particularly during the evening  Avoid daytime naps, especially if they are longer than 20 to 30 minutes or occur late in the day.           Relevant Medications    traZODone (DESYREL) 100 MG tablet       Future Appointments   Date Time Provider Department Center   6/6/2025  8:00 AM Chitra Aquino, SANDY [facility] INTMED [place]   7/15/2025  8:30 AM RESIDENT 2, [facility] OTORHINOLARYNGOLOGY [facility] ENT [place]    10/2/2025  1:00 PM NURSE, [facility]   10/2/2025  2:00 PM [facility]ita Wheeler F[facility]        Follow up in about 6 months (around 11/28/2025) for HTN, No labs.      Signature:      [facility]  [address] [facility] [address]    Date of encounter: 5/28/25

## 2025-06-05 ENCOUNTER — RESULTS FOLLOW-UP (OUTPATIENT)
Dept: INTERNAL MEDICINE | Facility: CLINIC | Age: 60
End: 2025-06-05

## 2025-06-05 ENCOUNTER — LAB VISIT (OUTPATIENT)
Dept: LAB | Facility: HOSPITAL | Age: 60
End: 2025-06-05
Payer: MEDICARE

## 2025-06-05 DIAGNOSIS — E03.9 HYPOTHYROIDISM, UNSPECIFIED TYPE: ICD-10-CM

## 2025-06-05 DIAGNOSIS — C76.0 HEAD AND NECK MALIGNANCY: ICD-10-CM

## 2025-06-05 DIAGNOSIS — C01 SQUAMOUS CELL CARCINOMA OF BASE OF TONGUE: ICD-10-CM

## 2025-06-05 DIAGNOSIS — Z12.5 SCREENING FOR MALIGNANT NEOPLASM OF PROSTATE: ICD-10-CM

## 2025-06-05 DIAGNOSIS — Z00.00 WELL ADULT EXAM: ICD-10-CM

## 2025-06-05 LAB
25(OH)D3+25(OH)D2 SERPL-MCNC: 19 NG/ML (ref 30–80)
ALBUMIN SERPL-MCNC: 3.8 G/DL (ref 3.5–5)
ALBUMIN/GLOB SERPL: 1.1 RATIO (ref 1.1–2)
ALP SERPL-CCNC: 78 UNIT/L (ref 40–150)
ALT SERPL-CCNC: 6 UNIT/L (ref 0–55)
ANION GAP SERPL CALC-SCNC: 7 MEQ/L
AST SERPL-CCNC: 17 UNIT/L (ref 11–45)
BACTERIA #/AREA URNS AUTO: ABNORMAL /HPF
BASOPHILS # BLD AUTO: 0.04 X10(3)/MCL
BASOPHILS NFR BLD AUTO: 0.5 %
BILIRUB SERPL-MCNC: 0.7 MG/DL
BILIRUB UR QL STRIP.AUTO: NEGATIVE
BUN SERPL-MCNC: 13.8 MG/DL (ref 8.4–25.7)
CALCIUM SERPL-MCNC: 8.9 MG/DL (ref 8.4–10.2)
CHLORIDE SERPL-SCNC: 108 MMOL/L (ref 98–107)
CHOLEST SERPL-MCNC: 205 MG/DL
CHOLEST/HDLC SERPL: 6 {RATIO} (ref 0–5)
CLARITY UR: CLEAR
CO2 SERPL-SCNC: 25 MMOL/L (ref 22–29)
COLOR UR AUTO: YELLOW
CREAT SERPL-MCNC: 1.27 MG/DL (ref 0.72–1.25)
CREAT/UREA NIT SERPL: 11
EOSINOPHIL # BLD AUTO: 0.38 X10(3)/MCL (ref 0–0.9)
EOSINOPHIL NFR BLD AUTO: 5 %
ERYTHROCYTE [DISTWIDTH] IN BLOOD BY AUTOMATED COUNT: 13.2 % (ref 11.5–17)
EST. AVERAGE GLUCOSE BLD GHB EST-MCNC: 108.3 MG/DL
GFR SERPLBLD CREATININE-BSD FMLA CKD-EPI: >60 ML/MIN/1.73/M2
GLOBULIN SER-MCNC: 3.4 GM/DL (ref 2.4–3.5)
GLUCOSE SERPL-MCNC: 96 MG/DL (ref 74–100)
GLUCOSE UR QL STRIP: NORMAL
HBA1C MFR BLD: 5.4 %
HCT VFR BLD AUTO: 39.9 % (ref 42–52)
HDLC SERPL-MCNC: 35 MG/DL (ref 35–60)
HGB BLD-MCNC: 13.4 G/DL (ref 14–18)
HGB UR QL STRIP: NEGATIVE
HYALINE CASTS #/AREA URNS LPF: ABNORMAL /LPF
IMM GRANULOCYTES # BLD AUTO: 0.02 X10(3)/MCL (ref 0–0.04)
IMM GRANULOCYTES NFR BLD AUTO: 0.3 %
KETONES UR QL STRIP: ABNORMAL
LDLC SERPL CALC-MCNC: 157 MG/DL (ref 50–140)
LEUKOCYTE ESTERASE UR QL STRIP: NEGATIVE
LYMPHOCYTES # BLD AUTO: 1.06 X10(3)/MCL (ref 0.6–4.6)
LYMPHOCYTES NFR BLD AUTO: 14 %
MCH RBC QN AUTO: 31.1 PG (ref 27–31)
MCHC RBC AUTO-ENTMCNC: 33.6 G/DL (ref 33–36)
MCV RBC AUTO: 92.6 FL (ref 80–94)
MONOCYTES # BLD AUTO: 0.72 X10(3)/MCL (ref 0.1–1.3)
MONOCYTES NFR BLD AUTO: 9.5 %
MUCOUS THREADS URNS QL MICRO: ABNORMAL /LPF
NEUTROPHILS # BLD AUTO: 5.33 X10(3)/MCL (ref 2.1–9.2)
NEUTROPHILS NFR BLD AUTO: 70.7 %
NITRITE UR QL STRIP: NEGATIVE
NRBC BLD AUTO-RTO: 0 %
PH UR STRIP: 6.5 [PH]
PLATELET # BLD AUTO: 279 X10(3)/MCL (ref 130–400)
PMV BLD AUTO: 8.9 FL (ref 7.4–10.4)
POTASSIUM SERPL-SCNC: 4 MMOL/L (ref 3.5–5.1)
PROT SERPL-MCNC: 7.2 GM/DL (ref 6.4–8.3)
PROT UR QL STRIP: ABNORMAL
PSA SERPL-MCNC: 5.4 NG/ML
RBC # BLD AUTO: 4.31 X10(6)/MCL (ref 4.7–6.1)
RBC #/AREA URNS AUTO: ABNORMAL /HPF
SODIUM SERPL-SCNC: 140 MMOL/L (ref 136–145)
SP GR UR STRIP.AUTO: 1.03 (ref 1–1.03)
SPERM URNS QL MICRO: ABNORMAL /HPF
SQUAMOUS #/AREA URNS LPF: ABNORMAL /HPF
T4 FREE SERPL-MCNC: 0.87 NG/DL (ref 0.7–1.48)
TRIGL SERPL-MCNC: 67 MG/DL (ref 34–140)
TSH SERPL-ACNC: 5.8 UIU/ML (ref 0.35–4.94)
UROBILINOGEN UR STRIP-ACNC: ABNORMAL
VLDLC SERPL CALC-MCNC: 13 MG/DL
WBC # BLD AUTO: 7.55 X10(3)/MCL (ref 4.5–11.5)
WBC #/AREA URNS AUTO: ABNORMAL /HPF

## 2025-06-05 PROCEDURE — 84153 ASSAY OF PSA TOTAL: CPT

## 2025-06-05 PROCEDURE — 84439 ASSAY OF FREE THYROXINE: CPT

## 2025-06-05 PROCEDURE — 80053 COMPREHEN METABOLIC PANEL: CPT

## 2025-06-05 PROCEDURE — 85025 COMPLETE CBC W/AUTO DIFF WBC: CPT

## 2025-06-05 PROCEDURE — 82306 VITAMIN D 25 HYDROXY: CPT

## 2025-06-05 PROCEDURE — 81015 MICROSCOPIC EXAM OF URINE: CPT

## 2025-06-05 PROCEDURE — 80061 LIPID PANEL: CPT

## 2025-06-05 PROCEDURE — 83036 HEMOGLOBIN GLYCOSYLATED A1C: CPT

## 2025-06-05 PROCEDURE — 36415 COLL VENOUS BLD VENIPUNCTURE: CPT

## 2025-06-05 PROCEDURE — 84443 ASSAY THYROID STIM HORMONE: CPT

## 2025-06-06 DIAGNOSIS — R97.20 ELEVATED PSA: Primary | ICD-10-CM

## 2025-07-14 ENCOUNTER — OFFICE VISIT (OUTPATIENT)
Dept: UROLOGY | Facility: CLINIC | Age: 60
End: 2025-07-14
Payer: MEDICARE

## 2025-07-14 VITALS
WEIGHT: 174 LBS | BODY MASS INDEX: 24.91 KG/M2 | HEART RATE: 87 BPM | OXYGEN SATURATION: 99 % | HEIGHT: 70 IN | RESPIRATION RATE: 18 BRPM | DIASTOLIC BLOOD PRESSURE: 99 MMHG | SYSTOLIC BLOOD PRESSURE: 161 MMHG

## 2025-07-14 DIAGNOSIS — R97.20 ELEVATED PSA: ICD-10-CM

## 2025-07-14 LAB
BILIRUB SERPL-MCNC: NORMAL MG/DL
BLOOD URINE, POC: NORMAL
COLOR, POC UA: YELLOW
GLUCOSE UR QL STRIP: NORMAL
KETONES UR QL STRIP: NORMAL
LEUKOCYTE ESTERASE URINE, POC: NORMAL
NITRITE, POC UA: NORMAL
PH, POC UA: 6
PROTEIN, POC: NORMAL
SPECIFIC GRAVITY, POC UA: 1.01
UROBILINOGEN, POC UA: 0.2

## 2025-07-14 PROCEDURE — 3044F HG A1C LEVEL LT 7.0%: CPT | Mod: CPTII,,, | Performed by: UROLOGY

## 2025-07-14 PROCEDURE — 1159F MED LIST DOCD IN RCRD: CPT | Mod: CPTII,,, | Performed by: UROLOGY

## 2025-07-14 PROCEDURE — 81001 URINALYSIS AUTO W/SCOPE: CPT | Mod: PBBFAC | Performed by: UROLOGY

## 2025-07-14 PROCEDURE — 3008F BODY MASS INDEX DOCD: CPT | Mod: CPTII,,, | Performed by: UROLOGY

## 2025-07-14 PROCEDURE — 3080F DIAST BP >= 90 MM HG: CPT | Mod: CPTII,,, | Performed by: UROLOGY

## 2025-07-14 PROCEDURE — 99204 OFFICE O/P NEW MOD 45 MIN: CPT | Mod: S$PBB,,, | Performed by: UROLOGY

## 2025-07-14 PROCEDURE — 99215 OFFICE O/P EST HI 40 MIN: CPT | Mod: PBBFAC | Performed by: UROLOGY

## 2025-07-14 PROCEDURE — 1160F RVW MEDS BY RX/DR IN RCRD: CPT | Mod: CPTII,,, | Performed by: UROLOGY

## 2025-07-14 PROCEDURE — 3077F SYST BP >= 140 MM HG: CPT | Mod: CPTII,,, | Performed by: UROLOGY

## 2025-07-14 NOTE — PROGRESS NOTES
CC:  Elevated PSA    HPI:  Yaron Brennan is a 60 y.o. male being seen in consultation for elevated PSA.  He was found to have elevated PSA on his most recent value in early June.  In reviewing his PSAs I find that in 2022 it was 1.29.  One year later in 2023 it had increased to 3.01.  The next value was the most recent one in June 2025 and it has now increased to 5.40.  He denies any direct family history of prostate cancer.  He has no urinary symptoms.  He has been treated for squamous cell carcinoma of the tongue.    Urinalysis:  Results for orders placed or performed in visit on 07/14/25   POCT URINE DIPSTICK WITH MICROSCOPE, AUTOMATED   Result Value Ref Range    Color, UA Yellow     Spec Grav UA 1.015     pH, UA 6.0     WBC, UA neg     Nitrite, UA neg     Protein, POC neg     Glucose, UA neg     Ketones, UA neg     Urobilinogen, UA 0.2     Bilirubin, POC neg     Blood, UA neg      Microscopic Urinalysis:  WBC:   None per HPF     RBC:    None per HPF     Bacteria:    None per HPF     Squamous epithelial cells:  None per HPF       Crystals:   None    Lab Results:  PSA History:    11/22/22 07:07 12/01/23 07:14 06/05/25 06:21   1.29 3.01 5.40 (H)     Data Review:  Note from referring provider, SANDY Anderson dated 28 May 2025.  PSA.     ROS:  All systems reviewed and are negative except as documented in HPI and/or Assessment and Plan.     Patient Active Problem List:     Problem List[1]     Past Medical History:  Past Medical History:   Diagnosis Date    Alcohol use 07/16/2022    Cancer     Hypertension         Past Surgical History:  Past Surgical History:   Procedure Laterality Date    BACK SURGERY      COLONOSCOPY  10/11/2023    DIRECT LARYNGOSCOPY N/A 08/05/2022    Procedure: LARYNGOSCOPY, DIRECT;  Surgeon: Mekhi Grossman MD;  Location: [facility];  Service: ENT;  Laterality: N/A;    DIRECT LARYNGOSCOPY N/A 10/02/2023    Procedure: LARYNGOSCOPY, DIRECT;  Surgeon: Mekhi Grossman MD;  Location: [facility];   Service: ENT;  Laterality: N/A;    EXTRACTION OF TOOTH N/A 08/05/2022    Procedure: EXTRACTION, TEETH;  Surgeon: Mekhi Grossman MD;  Location: [facility];  Service: ENT;  Laterality: N/A;    SPINE SURGERY  2000    Not sure of date maybe in 2000        Family History:  Family History   Problem Relation Name Age of Onset    Hypertension Mother Chayo Mary     Cancer Father Yaron Herrera Sr     Hypertension Sister Martha     Hypertension Brother Kevin         Social History:  Social History     Socioeconomic History    Marital status:      Spouse name: Blanca   Occupational History    Occupation: Disabled   Tobacco Use    Smoking status: Former     Current packs/day: 0.00     Types: Cigarettes     Start date: 1/1/1982     Quit date: 7/5/2022     Years since quitting: 3.0    Smokeless tobacco: Never    Tobacco comments:     Been almost 35 yr   Substance and Sexual Activity    Alcohol use: Not Currently    Drug use: Yes     Types: Marijuana     Comment: Daily    Sexual activity: Yes     Partners: Female     Birth control/protection: None     Social Drivers of Health     Financial Resource Strain: Medium Risk (4/24/2025)    Overall Financial Resource Strain (CARDIA)     Difficulty of Paying Living Expenses: Somewhat hard   Food Insecurity: Food Insecurity Present (4/24/2025)    Hunger Vital Sign     Worried About Running Out of Food in the Last Year: Sometimes true     Ran Out of Food in the Last Year: Sometimes true   Transportation Needs: No Transportation Needs (4/24/2025)    PRAPARE - Transportation     Lack of Transportation (Medical): No     Lack of Transportation (Non-Medical): No   Physical Activity: Sufficiently Active (4/24/2025)    Exercise Vital Sign     Days of Exercise per Week: 7 days     Minutes of Exercise per Session: 30 min   Stress: Stress Concern Present (4/24/2025)    [employer] [place] [employer] - Occupational Stress Questionnaire     Feeling of Stress : To some extent    Housing Stability: Low Risk  (4/24/2025)    Housing Stability Vital Sign     Unable to Pay for Housing in the Last Year: No     Number of Times Moved in the Last Year: 0     Homeless in the Last Year: No        Allergies:  Review of patient's allergies indicates:  No Known Allergies     Objective:  Vitals:    07/14/25 0750   BP: (!) 161/99   Pulse:    Resp:      General:  Well developed, well nourished adult male in no acute distress  Abdomen: Soft, nontender, no masses  Extremities:  No clubbing, cyanosis, or edema  Neurologic:  Grossly intact  Musculoskeletal:  Normal tone  Penis:  Circumcised, no lesions  Scrotum:  No hydrocele  Testicles:  Nontender, no masses  Epididymis:  Normal without masses  Prostate exam:  Deferred until the biopsy    Assessment:  1. Elevated PSA  - Ambulatory referral/consult to Urology  - POCT URINE DIPSTICK WITH MICROSCOPE, AUTOMATED  - Case Request Endoscopy: BIOPSY, PROSTATE, RECTAL APPROACH, WITH US GUIDANCE     Plan:  The PSA has been consistently rising over the last three values.  He needs a prostate biopsy.  I explained the procedure, risks, complications, and side effect with him.  I answered all his questions.  He verbalized understanding and wishes to proceed.     Follow-up tests needed:   None     Return appointment:  For prostate biopsy.                   [1]   Patient Active Problem List  Diagnosis    Squamous cell carcinoma of base of tongue    Secondary malignant neoplasm of cervical lymph node    Tobacco abuse    Acute deep vein thrombosis (DVT) of axillary vein of right upper extremity    Right subclavian vein thrombosis    Well adult exam    Primary hypertension    Positive colorectal cancer screening using Cologuard test    Hemorrhoids    Anemia    Other insomnia    Acute midline low back pain without sciatica    DDD (degenerative disc disease), lumbosacral    Lumbosacral radiculopathy

## 2025-07-14 NOTE — PROGRESS NOTES
Patient seen by Dr. Elizabeth. Pt will be scheduled for prostate biopsy. Pt education given both written and verbal.

## 2025-07-15 ENCOUNTER — OFFICE VISIT (OUTPATIENT)
Dept: OTOLARYNGOLOGY | Facility: CLINIC | Age: 60
End: 2025-07-15
Payer: MEDICARE

## 2025-07-15 DIAGNOSIS — Z92.3 HISTORY OF RADIATION TO HEAD AND NECK REGION: ICD-10-CM

## 2025-07-15 DIAGNOSIS — E89.0 HYPOTHYROIDISM DUE TO RADIATION: ICD-10-CM

## 2025-07-15 DIAGNOSIS — C01 SQUAMOUS CELL CARCINOMA OF BASE OF TONGUE: Primary | ICD-10-CM

## 2025-07-15 PROCEDURE — 99213 OFFICE O/P EST LOW 20 MIN: CPT | Mod: PBBFAC

## 2025-07-15 RX ORDER — LEVOTHYROXINE SODIUM 25 UG/1
25 TABLET ORAL
Qty: 30 TABLET | Refills: 11 | Status: SHIPPED | OUTPATIENT
Start: 2025-07-15 | End: 2026-07-15

## 2025-07-15 RX ORDER — LEVOTHYROXINE SODIUM 25 UG/1
25 TABLET ORAL
Qty: 30 TABLET | Refills: 11 | Status: SHIPPED | OUTPATIENT
Start: 2025-07-15 | End: 2025-07-15

## 2025-07-15 NOTE — PROGRESS NOTES
[facility]  Otolaryngology Clinic Note    Yaron Brennan  Encounter Date: 7/15/2025  YOB: 1965  Physician: Vaughn Babcock    Chief Complaint: Otalgia, neck mass    HPI: Yaron Brennan is a 60 y.o. male with HTN who presents as referral from Dr. Welsh for squamous cell carcinoma of base of tongue. Patient reports that toward the beginning of the year he started to have worsening left otalgia. He then noticed a bump/swelling on left neck that he thought was was due to a bug bite. This got progressively bigger over time and he then noticed some swelling on right neck as well. He eventually presented to [facility] in early July 2022. He had a CT neck that showed an ulcerated mass in the tongue base, and underwent direct laryngoscopy with ENT on 7/6/22 (Dr. Tay). Operative findings: ulcerated friable mass base of the tongue that seems confined to base of tongue; lingual surface of epiglottis is free from tumor; mass involves bilateral base of tongue, significantly midline. Results returned positive for p16+ SCCa. Today in clinic patient reports bilateral L>R otalgia, mild dysphagia/odynophagia. He reports he eats basically whatever he wants without overt signs of aspiration. He does report 40lb weight loss in the last 6 months. + Neck LAD. Reports voice changes. Denies any issues breathing. Smoked 1ppd since 17 years old (42 years) but quit a month ago when he got his diagnosis. Also previously drank 12 pack of beer a day and likewise quite a month ago. Referred to ENT for consideration of possible surgical resection.     07/05/2022: CT soft tissue of the neck without contrast:  -ulcerated tongue base mass, at least 3.4 cm by 3.0 cm x 3.1 cm; associated bulky level 2 and level 3 cervical lymphadenopathy; suspected level 1 and level 4 cervical lymphadenopathy; a right level 2 lymph node with central necrosis 2.6 x 3.3 cm; a left level 3 lymph node 4.5 x 3.2 cm; no  significant airway compromise; no acute or aggressive bony lesions  -malignant ulcerated type mass with lymph node metastasis; no airway compromise; right upper lung lobe indistinct ground-glass pulmonary nodules typical of benign infectious or inflammatory etiology    8/16/22: Here today for follow up. Had dental extractions performed on 8/5. Not having any issues. Taking in 4-5 ensures daily. Trying to take some other soft foods like grits and pudding.     9/22/22: Here for follow up. Patient is undergoing CRT and tolerating it well. He reports he has one more chemotherapy session left and about 2-3 weeks of radiation left. Overall maintaining his weight. No progressive dysphagia, swallowing fine. Also no breathing issues or other complaints.    11/3/22: Patient is here today for follow up.  He finished CRT on 10/10.  Patient states he is still in some pain in  his neck and inside of his mouth.   He is tolerating p.o. intake but states at times he does have difficulty swallowing.  He does have xerostomia.  Patient has not used his eardrops to loosen the cerumen impaction.  Denies any shortness of breath or difficulty breathing/ changes to voice,  changes to his neck masses.    12/7/22:  Patient returns for surveillance today.  Patient denies any new dysphagia, odynophagia, H&N lumps or bumps, otalgia, weight loss.  Overall pain is getting better, tolerating diet, gaining weight.    03/14/2023 doing well, no pain, no otalgia, dysphagia has improved significantly, weight is stable.  No hemoptysis.  Voice remains hoarse.  No airway distress.    4/11/2023: Returns today for follow-up. Reports doing well overall. No dysphagia, swallowing all foods without issue. Voice stable. No odynophagia, no SOB, no otalgia. Does note feeling like ears are clogged, has tried ear drops without success. Did get TSH since last visit, wnl.    5/18/23: Patient here today for follow up. Doing well since last visit. No major concerns.  Denies weight loss, new lumps/bumps, sore throat, dysphagia, odynophagia, or otalgia. Having some pain when chewing. Does not have dentures.     6/13/23: Returns today for follow-up/surveillance. Patient reports doing well overall. No issues with dysphagia, odynophagia, voice changes, otalgia, or weight loss. Still looking for dentist to get dentures made. Did get CT neck since last visit, but CXR and TSH still pending.     7/19/23: Here for surveillance. He is doing well. He is swallowing food of all consistencies without issue - no coughing or choking. He denies voice changes, weight loss, pain, or new lumps/bumps. He is breathing comfortably and active. Having trouble with sleep at night.    8/16/23: He has been doing very well since his last visit no issues at all.  He has eating a regular diet and no issues swallowing.  No voice changes lumps or bumps in her neck.    9/19/23:  Here today for follow-up after CT.  No issues in the interim.  Dysphagia and odynophagia are stable: Able to eat most things except for tough meat.  Weight has been stable.  No noted lumps/bumps.    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  Patient has done well postoperatively.  He still has a mild sore throat but this is improving.  He has no respiratory distress or dysphagia.  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  There was no evidence of malignancy.  Results were discussed with the patient.  Patient has no new problems today.    December 19, 2023:   Patient presents today for routine surveillance.  Patient is doing well.  He is tolerating his diet.  His only complaint is that of some xerostomia but this is not a significant problem.  He has no complaints of sore throat, ear pain, swelling in the neck, or difficulty  swallowing.  He has no other new problems today.    2/8/2024:   Patient presents today for routine follow-up.  Patient is doing well.  He had been seen by Dr. Leavitt, radiation Oncology, last week who found the patient to be without evident disease.  Patient continues to do well.  He has no complaints of sore throat, ear pain, difficulty swallowing, or swelling in the neck.  No hemoptysis.  Appetite has been good.  He has no other new problems today.    2/29/24: here today for follow up. No new issues. Denies sore throat, otalgia, dysphagia, or neck lumps/bumps. Has been maintaining weight with good appetite.     4/30/2024:  Here today for follow up.  No new issues.  Denies sore throat, otalgia, weight loss, or new lumps or bumps in his neck. Weight stable.     7/31/24:  Here for follow-up.  Reports occasional hoarseness otherwise no odynophagia dysphagia otalgia lumps or bumps.  Changes in weight, doing well    10/31/24: Doing well with no issues. No new lumps/bumps in neck. No ear pain, dysphagia, odynophagia, or weight loss. No complaints.     1/15/2025:  Returns in follow-up.  Denies any symptoms concerning for recurrence including odynophagia, dysphagia, ear pain, or new lumps or bumps.  His weight has been stable.  No concerns at this time.    4/15/25:  Returns for surveillance patient reports he has been doing well since we last saw him.  He is keeping up his weight he has not having too much trouble swallowing unless it is Rice in he resolves this was drinking plenty of water.  Only thing is patient needs a new pair of glasses and was discharge from his last eye doctor due to insurance.  He denies any ear pain, throat pain, voice changes, new lumps or bumps    7/15/25: Returns in follow up. Denies dysphagia, odynophagia, dysphonia, sore throat or otalgia. Discussed TSH level. Has not needed synthroid since treatment    Review of patient's allergies indicates:  No Known Allergies    Past Medical History:    Diagnosis Date    Alcohol use 07/16/2022    Cancer     Hypertension        Past Surgical History:   Procedure Laterality Date    BACK SURGERY      COLONOSCOPY  10/11/2023    DIRECT LARYNGOSCOPY N/A 08/05/2022    Procedure: LARYNGOSCOPY, DIRECT;  Surgeon: Mekhi Grossman MD;  Location: [facility];  Service: ENT;  Laterality: N/A;    DIRECT LARYNGOSCOPY N/A 10/02/2023    Procedure: LARYNGOSCOPY, DIRECT;  Surgeon: Mekhi Grossman MD;  Location: [facility] OR;  Service: ENT;  Laterality: N/A;    EXTRACTION OF TOOTH N/A 08/05/2022    Procedure: EXTRACTION, TEETH;  Surgeon: Mekhi Grossman MD;  Location: [facility] OR;  Service: ENT;  Laterality: N/A;    SPINE SURGERY  2000    Not sure of date maybe in 2000       Social History     Socioeconomic History    Marital status:      Spouse name: Blanca   Occupational History    Occupation: Disabled   Tobacco Use    Smoking status: Former     Current packs/day: 0.00     Types: Cigarettes     Start date: 1/1/1982     Quit date: 7/5/2022     Years since quitting: 3.0    Smokeless tobacco: Never    Tobacco comments:     Been almost 35 yr   Substance and Sexual Activity    Alcohol use: Not Currently    Drug use: Yes     Types: Marijuana     Comment: Daily    Sexual activity: Yes     Partners: Female     Birth control/protection: None     Social Drivers of Health     Financial Resource Strain: Medium Risk (4/24/2025)    Overall Financial Resource Strain (CARDIA)     Difficulty of Paying Living Expenses: Somewhat hard   Food Insecurity: Food Insecurity Present (4/24/2025)    Hunger Vital Sign     Worried About Running Out of Food in the Last Year: Sometimes true     Ran Out of Food in the Last Year: Sometimes true   Transportation Needs: No Transportation Needs (4/24/2025)    PRAPARE - Transportation     Lack of Transportation (Medical): No     Lack of Transportation (Non-Medical): No   Physical Activity: Sufficiently Active (4/24/2025)    Exercise Vital Sign     Days of Exercise  per Week: 7 days     Minutes of Exercise per Session: 30 min   Stress: Stress Concern Present (4/24/2025)    [employer] - Occupational Stress Questionnaire     Feeling of Stress : To some extent   Housing Stability: Low Risk  (4/24/2025)    Housing Stability Vital Sign     Unable to Pay for Housing in the Last Year: No     Number of Times Moved in the Last Year: 0     Homeless in the Last Year: No       Family History   Problem Relation Name Age of Onset    Hypertension Mother Chayo Mary     Cancer Father Yaron Herrera Sr     Hypertension Sister Martha     Hypertension Brother Kevin        Outpatient Encounter Medications as of 7/15/2025   Medication Sig Dispense Refill    amLODIPine (NORVASC) 10 MG tablet Take 1 tablet (10 mg total) by mouth once daily. 90 tablet 2    melatonin (MELATIN) 3 mg tablet Take 2 tablets (6 mg total) by mouth nightly as needed for Insomnia. 30 tablet 0    nabumetone (RELAFEN) 750 MG tablet Take 1 tablet (750 mg total) by mouth 2 (two) times daily as needed for Pain. 30 tablet 6    ondansetron (ZOFRAN-ODT) 4 MG TbDL Take 1 tablet (4 mg total) by mouth every 8 (eight) hours as needed (nausea). 15 tablet 0    tiZANidine (ZANAFLEX) 4 MG tablet Take 1 tablet (4 mg total) by mouth every 8 (eight) hours as needed (muscle pain or spasm). 30 tablet 6    traZODone (DESYREL) 100 MG tablet Take 1 tablet (100 mg total) by mouth every evening. 90 tablet 2     Facility-Administered Encounter Medications as of 7/15/2025   Medication Dose Route Frequency Provider Last Rate Last Admin    lactated ringers infusion   Intravenous Continuous Elzbieta Quesada MD 10 mL/hr at 08/05/22 0808 New Bag at 08/05/22 0808    LIDOcaine (PF) 10 mg/ml (1%) injection 10 mg  1 mL Intradermal Once Yajaira Watts FNP        LIDOcaine (PF) 10 mg/ml (1%) injection 10 mg  1 mL Intradermal Once Yajaira Watts FNP        LIDOcaine (PF) 40 mg/mL (4 %) injection 1 mL  1 mL  Other 1 time in Clinic/HOD Mekhi Grossman MD        LIDOcaine (PF) 40 mg/mL (4 %) injection 1 mL  1 mL Other 1 time in Clinic/HOD         phenylephrine HCL 1% nasal spray 1 spray  1 spray Each Nostril 1 time in Clinic/HOD Mekhi Grossman MD        phenylephrine HCL 1% nasal spray 1 spray  1 spray Each Nostril 1 time in Clinic/HOD         sodium chloride 0.9% flush 10 mL  10 mL Intravenous PRN Anna Taylor MD        sodium chloride 0.9% flush 10 mL  10 mL Intravenous PRN Jose Johns MD           Physical Exam:  There were no vitals taken for this visit.    General:  Well-developed well-nourished male in no acute distress.  Voice is normal.  Head and face:  Normocephalic.  No facial lesions.  No temporomandibular joint tenderness or click.  Ears:    Right ear-auricle is normally developed.  External auditory canal is clear.  Tympanic membrane is nonerythematous.  No middle ear effusion.   Left ear-auricle is normally developed.  External auditory canal is clear.  Tympanic membrane is nonerythematous.  No middle ear effusion.  Nose:  Nasal dorsum is unremarkable.  No significant septal deviation.  No significant intranasal congestion.  Oral cavity and oropharynx:  Tongue and floor mouth are without lesions.  Base of tongue is soft  Mucosa is moist.  No pharyngeal erythema or exudates.  No oropharyngeal masses.  No tonsillar hypertrophy.   Neck:  Supple without adenopathy or thyromegaly.  Trachea is in the midline.  Parotid and submandibular glands are normal to palpation without masses or tenderness.  Eyes:  Extraocular muscles intact.  No nystagmus.  No exophthalmos or enophthalmos.  Neurologic:  Alert and oriented.  Cranial nerves II-XII are grossly normal.    PROCEDURE: Flexible fiberoptic laryngoscopy    Surgeon: Vaughn Babcock   Anesthesia: None  Complications: None  Date: 07/15/2025  Indication: hx of cancer    Procedure in Detail:    Informed consent was obtained from the patient after explanation  of procedure, indications, risks and benefits. Flexible laryngoscopy was performed on Yaron Brennan through the right nasal passage(s). The nasal cavity, nasopharynx, oropharynx, hypopharynx and larynx were adequately visualized. The true vocal cords and arytenoids were examined during phonation and repose.    Operative Findings:    Nasal Cavity:  nasal airway patent without lesions or ulcerations  Nasopharynx: No adenoid hypertrophy, no masses or ulcerations noted in the fossae of Rosenmüller, patent appearing mariel tubarii  Tongue Base: No fullness or lingual tonsillar hypertrophy. No masses.  Pharyngeal Walls: No lesions or ulcerations noted  Epiglottis / Aryepiglottic Folds: Crisp epiglottis without lesions, normal-appearing aryepiglottic folds without lesions.   Arytenoids: Full symmetric range of motion bilaterally, no lesions  Piriform Sinus: No lesions or masses noted  Vocal Cords: Symmetric movement bilaterally with good glottic closure, patent airway during inspiration and expiration, no lesions or masses      The patient tolerated the procedure well without any complications.    Impression: Radiation changes with no identifiable malignancy.  Dry mucosa with mucus.    Pertinent Data:  TSH 5.8 (elevated)   T4 0.87    Assessment:  Yaron Brennan is a 60 y.o. 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 CRT, finished 10/10/22.  Most recent CT (2/22/24) without evidence of disease. PET 1/11/23 without disease (in [facility] system), TSH wnl.     IVAN today.     Plan:   - start 25 mcg synthroid, repeat TSH, T4 in 3 months  - surveillance chest x-ray and CT neck (w/o contrast due to elevated Cr) prior to next visit  - return to clinic 3 months    Vaughn Babcock MD  [employer] Otolaryngology PGY-III  7/15/2025 9:27 AM      HEAD & NECK CANCER SURVEILLANCE   Radiation and/or Chemotherapy     Medical Oncologist: Kevon Welsh,  MD  Radiation Oncologist: Unknown    Primary tumor: cT3 cN2 M0 p16+ base of tongue    Date of Diagnosis: 7/6/22  Therapy: Cisplatin 100 mg/m² IV days 1, 22, and 23, concurrent with RT  Completion Date: 10/10/22    Pertinent Treatment History:   First presentation 7/2022  CT neck 7/5/22 & 7/29/22  Panendoscopy 7/6/22  Right UE DVT 2/2 PICC 9/15/22 on Xarelto  CRT 8/25/22 - 10/10/22  Direct laryngoscopy with biopsy of benign laryngeal ductal cyst on October 2, 2023.    Place date if completed   3 6 12 18 24 36 48 60   CT Neck 1/31/23 6/8/23 8/25/23 2/22/24 8/30/24 X X X   PET/CT 1/11/23          CXR  7/17/23 X X 8/30/24 X X X   TFT 2/2/23 7/20/23 X  X X X X     Current Surveillance Interval: post-tx year 2-3, q3-4 mo

## 2025-07-15 NOTE — PROGRESS NOTES
The scope used for the exam was:  Flexible scope ENF-P4  Serial Number:  1)    [id number]    []   2)    [id number]    []   3)    [id number]    []   4)    [id number]    []   5)    [id number]    []   6)    [id number]    [x]     7)    [id number]     []     8)    [id number]     []     9)    [id number]     []    10)  [id number]      []       The scope used for the exam was:  Rigid scope   Serial Number:  1)   [id number]    []   2)   [id number]    []   3)   [id number]    []   4)    3384   []   5)    0824   []   6)    5554   []     7)   7425    []   8)   2240    []   9)   1109    []

## 2025-07-22 DIAGNOSIS — R30.0 DYSURIA: Primary | ICD-10-CM

## 2025-07-22 DIAGNOSIS — Z01.818 PRE-OP TESTING: ICD-10-CM

## 2025-08-01 ENCOUNTER — TELEPHONE (OUTPATIENT)
Dept: HEMATOLOGY/ONCOLOGY | Facility: CLINIC | Age: 60
End: 2025-08-01
Payer: MEDICARE

## 2025-08-22 ENCOUNTER — TELEPHONE (OUTPATIENT)
Dept: UROLOGY | Facility: CLINIC | Age: 60
End: 2025-08-22
Payer: MEDICARE

## 2025-08-25 ENCOUNTER — LAB VISIT (OUTPATIENT)
Dept: LAB | Facility: HOSPITAL | Age: 60
End: 2025-08-25
Attending: UROLOGY
Payer: MEDICARE

## 2025-08-25 DIAGNOSIS — Z01.818 PRE-OP TESTING: ICD-10-CM

## 2025-08-25 DIAGNOSIS — R30.0 DYSURIA: ICD-10-CM

## 2025-08-25 PROCEDURE — 87086 URINE CULTURE/COLONY COUNT: CPT

## 2025-08-27 LAB — BACTERIA UR CULT: NO GROWTH

## 2025-08-31 DIAGNOSIS — G47.09 OTHER INSOMNIA: ICD-10-CM

## 2025-08-31 DIAGNOSIS — I10 PRIMARY HYPERTENSION: Chronic | ICD-10-CM

## 2025-08-31 DIAGNOSIS — M51.372 DEGENERATION OF INTERVERTEBRAL DISC OF LUMBOSACRAL REGION WITH DISCOGENIC BACK PAIN AND LOWER EXTREMITY PAIN: ICD-10-CM

## 2025-08-31 RX ORDER — NABUMENTONE 750 MG/1
750 TABLET ORAL 2 TIMES DAILY PRN
Qty: 30 TABLET | Refills: 6 | Status: CANCELLED | OUTPATIENT
Start: 2025-08-31

## 2025-09-02 ENCOUNTER — ANESTHESIA (OUTPATIENT)
Dept: ENDOSCOPY | Facility: HOSPITAL | Age: 60
End: 2025-09-02
Payer: MEDICARE

## 2025-09-02 ENCOUNTER — ANESTHESIA EVENT (OUTPATIENT)
Dept: ENDOSCOPY | Facility: HOSPITAL | Age: 60
End: 2025-09-02
Payer: MEDICARE

## 2025-09-02 PROBLEM — R97.20 ELEVATED PSA: Status: ACTIVE | Noted: 2025-09-02

## 2025-09-02 PROCEDURE — 63600175 PHARM REV CODE 636 W HCPCS: Performed by: NURSE ANESTHETIST, CERTIFIED REGISTERED

## 2025-09-02 RX ORDER — PROPOFOL 10 MG/ML
INJECTION, EMULSION INTRAVENOUS CONTINUOUS PRN
Status: DISCONTINUED | OUTPATIENT
Start: 2025-09-02 | End: 2025-09-02

## 2025-09-02 RX ORDER — LIDOCAINE HYDROCHLORIDE 20 MG/ML
INJECTION INTRAVENOUS
Status: DISCONTINUED | OUTPATIENT
Start: 2025-09-02 | End: 2025-09-02

## 2025-09-02 RX ORDER — AMLODIPINE BESYLATE 10 MG/1
10 TABLET ORAL DAILY
Qty: 90 TABLET | Refills: 2 | Status: SHIPPED | OUTPATIENT
Start: 2025-09-02 | End: 2026-05-30

## 2025-09-02 RX ORDER — TRAZODONE HYDROCHLORIDE 100 MG/1
100 TABLET ORAL NIGHTLY
Qty: 90 TABLET | Refills: 2 | Status: SHIPPED | OUTPATIENT
Start: 2025-09-02 | End: 2026-05-30

## 2025-09-02 RX ORDER — TIZANIDINE 4 MG/1
4 TABLET ORAL EVERY 8 HOURS PRN
Qty: 30 TABLET | Refills: 6 | Status: SHIPPED | OUTPATIENT
Start: 2025-09-02

## 2025-09-02 RX ADMIN — LIDOCAINE HYDROCHLORIDE 50 MG: 20 INJECTION INTRAVENOUS at 09:09

## 2025-09-02 RX ADMIN — PROPOFOL 112 MCG/KG/MIN: 10 INJECTION, EMULSION INTRAVENOUS at 09:09

## 2025-09-03 RX ORDER — LEVOTHYROXINE SODIUM 25 UG/1
25 TABLET ORAL
Qty: 30 TABLET | Refills: 11 | Status: SHIPPED | OUTPATIENT
Start: 2025-09-03 | End: 2026-09-03

## (undated) DEVICE — GLOVE PROTEXIS HYDROGEL SZ7

## (undated) DEVICE — SPONGE PATTY SURGICAL .5X3IN

## (undated) DEVICE — COVER MAYO STAND REINFRCD 30

## (undated) DEVICE — KIT ANTIFOG W/SPONG & FLUID

## (undated) DEVICE — HANDLE MEDIVAC SUC YANK BLBOUS

## (undated) DEVICE — SOL 9P NACL IRR PIC IL

## (undated) DEVICE — PAD CURAD NONADH 3X4IN

## (undated) DEVICE — PACK HEAD & NECK

## (undated) DEVICE — MANIFOLD 4 PORT

## (undated) DEVICE — GOWN POLY REINF BRTH SLV XL

## (undated) DEVICE — GLOVE PROTEXIS NEOPRN SZ6.0

## (undated) DEVICE — PACK EENT SURG II ECLIPSE

## (undated) DEVICE — SUT CHROMIC 3-0 SH 27IN GUT

## (undated) DEVICE — GAUZE VISTEC XR DTECT 16 4X4IN

## (undated) DEVICE — POSITIONER IV ARMBOARD FOAM

## (undated) DEVICE — MARKER WRITESITE SKIN CHLRAPRP

## (undated) DEVICE — KIT SURGICAL TURNOVER

## (undated) DEVICE — TOWEL OR DISP STRL BLUE 4/PK

## (undated) DEVICE — POSITIONER HEAD SUPINE PINK

## (undated) DEVICE — SYR 10CC LUER LOCK

## (undated) DEVICE — NDL 27G X 1 1/4

## (undated) DEVICE — DRESSING TELFA STRL 4X3 LF

## (undated) DEVICE — GLOVE PROTEXIS BLUE LATEX 7

## (undated) DEVICE — TUBING MEDI-VAC 20FT .25IN

## (undated) DEVICE — SYR IRRIGATION BULB STER 60ML

## (undated) DEVICE — GLOVE SENSICARE PI GRN 6.5

## (undated) DEVICE — DRAPE DEVON MAGNETIC 16X20IN

## (undated) DEVICE — PROTECTOR ONE-STEP ARM REG

## (undated) DEVICE — SOL NACL IRR 1000ML BTL

## (undated) DEVICE — HANDLE DEVON RIGID OR LIGHT

## (undated) DEVICE — Device

## (undated) DEVICE — COUNTER NDL FOAM MAGNET 40/70

## (undated) DEVICE — NDL MAGELLAN SAFETY 18G 1.5IN

## (undated) DEVICE — SUPPORT DONUT ADULT 9IN